# Patient Record
Sex: MALE | Race: WHITE | Employment: OTHER | URBAN - METROPOLITAN AREA
[De-identification: names, ages, dates, MRNs, and addresses within clinical notes are randomized per-mention and may not be internally consistent; named-entity substitution may affect disease eponyms.]

---

## 2017-10-30 PROBLEM — F17.200 SMOKER: Status: ACTIVE | Noted: 2017-10-30

## 2017-10-30 PROBLEM — G47.00 INSOMNIA: Status: ACTIVE | Noted: 2017-10-30

## 2017-10-30 PROBLEM — E78.00 HYPERCHOLESTEREMIA: Status: ACTIVE | Noted: 2017-10-30

## 2018-03-08 ENCOUNTER — ANESTHESIA EVENT (OUTPATIENT)
Dept: SURGERY | Age: 68
End: 2018-03-08
Payer: MEDICARE

## 2018-03-09 ENCOUNTER — HOSPITAL ENCOUNTER (OUTPATIENT)
Age: 68
Setting detail: OUTPATIENT SURGERY
Discharge: HOME | End: 2018-03-09
Attending: PODIATRIST | Admitting: PODIATRIST
Payer: MEDICARE

## 2018-03-09 ENCOUNTER — ANESTHESIA (OUTPATIENT)
Dept: SURGERY | Age: 68
End: 2018-03-09
Payer: MEDICARE

## 2018-03-09 ENCOUNTER — APPOINTMENT (OUTPATIENT)
Dept: GENERAL RADIOLOGY | Age: 68
End: 2018-03-09
Attending: PODIATRIST
Payer: MEDICARE

## 2018-03-09 VITALS
WEIGHT: 185 LBS | TEMPERATURE: 97.7 F | HEART RATE: 66 BPM | SYSTOLIC BLOOD PRESSURE: 132 MMHG | RESPIRATION RATE: 12 BRPM | BODY MASS INDEX: 25.09 KG/M2 | DIASTOLIC BLOOD PRESSURE: 79 MMHG | OXYGEN SATURATION: 95 %

## 2018-03-09 PROCEDURE — 77030004413 HC BUR OVL STRY -B: Performed by: PODIATRIST

## 2018-03-09 PROCEDURE — 77030032490 HC SLV COMPR SCD KNE COVD -B: Performed by: PODIATRIST

## 2018-03-09 PROCEDURE — 76010000161 HC OR TIME 1 TO 1.5 HR INTENSV-TIER 1: Performed by: PODIATRIST

## 2018-03-09 PROCEDURE — 74011250636 HC RX REV CODE- 250/636: Performed by: ANESTHESIOLOGY

## 2018-03-09 PROCEDURE — 76942 ECHO GUIDE FOR BIOPSY: CPT | Performed by: PODIATRIST

## 2018-03-09 PROCEDURE — 77030018836 HC SOL IRR NACL ICUM -A: Performed by: PODIATRIST

## 2018-03-09 PROCEDURE — 77030031139 HC SUT VCRL2 J&J -A: Performed by: PODIATRIST

## 2018-03-09 PROCEDURE — 77030002982 HC SUT POLYSRB J&J -A: Performed by: PODIATRIST

## 2018-03-09 PROCEDURE — 77030000032 HC CUF TRNQT ZIMM -B: Performed by: PODIATRIST

## 2018-03-09 PROCEDURE — 77030006788 HC BLD SAW OSC STRY -B: Performed by: PODIATRIST

## 2018-03-09 PROCEDURE — 77030003602 HC NDL NRV BLK BBMI -B: Performed by: NURSE ANESTHETIST, CERTIFIED REGISTERED

## 2018-03-09 PROCEDURE — 74011250636 HC RX REV CODE- 250/636: Performed by: PODIATRIST

## 2018-03-09 PROCEDURE — 77030011640 HC PAD GRND REM COVD -A: Performed by: PODIATRIST

## 2018-03-09 PROCEDURE — 74011250636 HC RX REV CODE- 250/636

## 2018-03-09 PROCEDURE — 74011000250 HC RX REV CODE- 250: Performed by: PODIATRIST

## 2018-03-09 PROCEDURE — 76210000063 HC OR PH I REC FIRST 0.5 HR: Performed by: PODIATRIST

## 2018-03-09 PROCEDURE — C1776 JOINT DEVICE (IMPLANTABLE): HCPCS | Performed by: PODIATRIST

## 2018-03-09 PROCEDURE — 76060000034 HC ANESTHESIA 1.5 TO 2 HR: Performed by: PODIATRIST

## 2018-03-09 PROCEDURE — 76210000020 HC REC RM PH II FIRST 0.5 HR: Performed by: PODIATRIST

## 2018-03-09 PROCEDURE — 76010010054 HC POST OP PAIN BLOCK: Performed by: PODIATRIST

## 2018-03-09 DEVICE — JOINT METATRSL MTP 10MM --: Type: IMPLANTABLE DEVICE | Site: FOOT | Status: FUNCTIONAL

## 2018-03-09 RX ORDER — CELECOXIB 200 MG/1
200 CAPSULE ORAL
Status: DISCONTINUED | OUTPATIENT
Start: 2018-03-09 | End: 2018-03-09 | Stop reason: HOSPADM

## 2018-03-09 RX ORDER — DEXAMETHASONE SODIUM PHOSPHATE 100 MG/10ML
INJECTION INTRAMUSCULAR; INTRAVENOUS AS NEEDED
Status: DISCONTINUED | OUTPATIENT
Start: 2018-03-09 | End: 2018-03-09 | Stop reason: HOSPADM

## 2018-03-09 RX ORDER — MIDAZOLAM HYDROCHLORIDE 1 MG/ML
2 INJECTION, SOLUTION INTRAMUSCULAR; INTRAVENOUS
Status: DISCONTINUED | OUTPATIENT
Start: 2018-03-09 | End: 2018-03-09 | Stop reason: HOSPADM

## 2018-03-09 RX ORDER — SODIUM CHLORIDE, SODIUM LACTATE, POTASSIUM CHLORIDE, CALCIUM CHLORIDE 600; 310; 30; 20 MG/100ML; MG/100ML; MG/100ML; MG/100ML
75 INJECTION, SOLUTION INTRAVENOUS CONTINUOUS
Status: DISCONTINUED | OUTPATIENT
Start: 2018-03-09 | End: 2018-03-09 | Stop reason: HOSPADM

## 2018-03-09 RX ORDER — SODIUM CHLORIDE 0.9 % (FLUSH) 0.9 %
5-10 SYRINGE (ML) INJECTION AS NEEDED
Status: DISCONTINUED | OUTPATIENT
Start: 2018-03-09 | End: 2018-03-09 | Stop reason: HOSPADM

## 2018-03-09 RX ORDER — BACITRACIN 50000 [IU]/1
INJECTION, POWDER, FOR SOLUTION INTRAMUSCULAR AS NEEDED
Status: DISCONTINUED | OUTPATIENT
Start: 2018-03-09 | End: 2018-03-09 | Stop reason: HOSPADM

## 2018-03-09 RX ORDER — LIDOCAINE HYDROCHLORIDE 10 MG/ML
0.1 INJECTION INFILTRATION; PERINEURAL AS NEEDED
Status: DISCONTINUED | OUTPATIENT
Start: 2018-03-09 | End: 2018-03-09 | Stop reason: HOSPADM

## 2018-03-09 RX ORDER — OXYCODONE HYDROCHLORIDE 5 MG/1
5 TABLET ORAL
Status: DISCONTINUED | OUTPATIENT
Start: 2018-03-09 | End: 2018-03-09 | Stop reason: HOSPADM

## 2018-03-09 RX ORDER — SODIUM CHLORIDE, SODIUM LACTATE, POTASSIUM CHLORIDE, CALCIUM CHLORIDE 600; 310; 30; 20 MG/100ML; MG/100ML; MG/100ML; MG/100ML
50 INJECTION, SOLUTION INTRAVENOUS CONTINUOUS
Status: DISCONTINUED | OUTPATIENT
Start: 2018-03-09 | End: 2018-03-09 | Stop reason: HOSPADM

## 2018-03-09 RX ORDER — ALBUTEROL SULFATE 0.83 MG/ML
2.5 SOLUTION RESPIRATORY (INHALATION) AS NEEDED
Status: DISCONTINUED | OUTPATIENT
Start: 2018-03-09 | End: 2018-03-09 | Stop reason: HOSPADM

## 2018-03-09 RX ORDER — SODIUM CHLORIDE 0.9 % (FLUSH) 0.9 %
5-10 SYRINGE (ML) INJECTION EVERY 8 HOURS
Status: DISCONTINUED | OUTPATIENT
Start: 2018-03-09 | End: 2018-03-09 | Stop reason: HOSPADM

## 2018-03-09 RX ORDER — FENTANYL CITRATE 50 UG/ML
100 INJECTION, SOLUTION INTRAMUSCULAR; INTRAVENOUS ONCE
Status: DISCONTINUED | OUTPATIENT
Start: 2018-03-09 | End: 2018-03-09 | Stop reason: HOSPADM

## 2018-03-09 RX ORDER — MIDAZOLAM HYDROCHLORIDE 1 MG/ML
2 INJECTION, SOLUTION INTRAMUSCULAR; INTRAVENOUS ONCE
Status: COMPLETED | OUTPATIENT
Start: 2018-03-09 | End: 2018-03-09

## 2018-03-09 RX ORDER — HYDROMORPHONE HYDROCHLORIDE 2 MG/ML
0.5 INJECTION, SOLUTION INTRAMUSCULAR; INTRAVENOUS; SUBCUTANEOUS
Status: DISCONTINUED | OUTPATIENT
Start: 2018-03-09 | End: 2018-03-09 | Stop reason: HOSPADM

## 2018-03-09 RX ORDER — SODIUM CHLORIDE 9 MG/ML
INJECTION INTRAMUSCULAR; INTRAVENOUS; SUBCUTANEOUS AS NEEDED
Status: DISCONTINUED | OUTPATIENT
Start: 2018-03-09 | End: 2018-03-09 | Stop reason: HOSPADM

## 2018-03-09 RX ORDER — PROPOFOL 10 MG/ML
INJECTION, EMULSION INTRAVENOUS
Status: DISCONTINUED | OUTPATIENT
Start: 2018-03-09 | End: 2018-03-09 | Stop reason: HOSPADM

## 2018-03-09 RX ORDER — BUPIVACAINE HYDROCHLORIDE 5 MG/ML
INJECTION, SOLUTION EPIDURAL; INTRACAUDAL AS NEEDED
Status: DISCONTINUED | OUTPATIENT
Start: 2018-03-09 | End: 2018-03-09 | Stop reason: HOSPADM

## 2018-03-09 RX ORDER — CEFAZOLIN SODIUM/WATER 2 G/20 ML
2 SYRINGE (ML) INTRAVENOUS
Status: COMPLETED | OUTPATIENT
Start: 2018-03-09 | End: 2018-03-09

## 2018-03-09 RX ORDER — PROPOFOL 10 MG/ML
INJECTION, EMULSION INTRAVENOUS AS NEEDED
Status: DISCONTINUED | OUTPATIENT
Start: 2018-03-09 | End: 2018-03-09 | Stop reason: HOSPADM

## 2018-03-09 RX ADMIN — MIDAZOLAM HYDROCHLORIDE 2 MG: 1 INJECTION, SOLUTION INTRAMUSCULAR; INTRAVENOUS at 09:44

## 2018-03-09 RX ADMIN — SODIUM CHLORIDE, SODIUM LACTATE, POTASSIUM CHLORIDE, AND CALCIUM CHLORIDE 75 ML/HR: 600; 310; 30; 20 INJECTION, SOLUTION INTRAVENOUS at 08:45

## 2018-03-09 RX ADMIN — PROPOFOL 200 MCG/KG/MIN: 10 INJECTION, EMULSION INTRAVENOUS at 10:59

## 2018-03-09 RX ADMIN — Medication 2 G: at 11:06

## 2018-03-09 RX ADMIN — PROPOFOL 30 MG: 10 INJECTION, EMULSION INTRAVENOUS at 10:59

## 2018-03-09 RX ADMIN — PROPOFOL 20 MG: 10 INJECTION, EMULSION INTRAVENOUS at 11:04

## 2018-03-09 NOTE — ANESTHESIA PREPROCEDURE EVALUATION
Anesthetic History   No history of anesthetic complications            Review of Systems / Medical History  Patient summary reviewed, nursing notes reviewed and pertinent labs reviewed    Pulmonary          Smoker (recently quit Feb 2018)         Neuro/Psych   Within defined limits           Cardiovascular  Within defined limits                Exercise tolerance: >4 METS     GI/Hepatic/Renal  Within defined limits              Endo/Other        Arthritis     Other Findings              Physical Exam    Airway  Mallampati: II      Mouth opening: Normal     Cardiovascular  Regular rate and rhythm,  S1 and S2 normal,  no murmur, click, rub, or gallop             Dental  No notable dental hx       Pulmonary  Breath sounds clear to auscultation               Abdominal         Other Findings            Anesthetic Plan    ASA: 2  Anesthesia type: total IV anesthesia - femoral single shot and popliteal fossa block      Post-op pain plan if not by surgeon: peripheral nerve block single    Induction: Intravenous  Anesthetic plan and risks discussed with: Patient and Spouse

## 2018-03-09 NOTE — ANESTHESIA POSTPROCEDURE EVALUATION
Post-Anesthesia Evaluation and Assessment    Patient: Doris Chambers MRN: [id number]  SSN: [id number]    YOB: 1950  Age: 74 y.o. Sex: male       Cardiovascular Function/Vital Signs  Visit Vitals    /79    Pulse 66    Temp 36.5 °C (97.7 °F)    Resp 12    Wt 83.9 kg (185 lb)    SpO2 95%    BMI 25.09 kg/m2       Patient is status post total IV anesthesia anesthesia for Procedure(s):  RIGHT FOOT 1ST MTP CHEILECTOMY WITH CARTIVA IMPLANT  POLITEAL SAPHENOUS NERVE BLOCK. Nausea/Vomiting: None    Postoperative hydration reviewed and adequate. Pain:  Pain Scale 1: Numeric (0 - 10) (03/09/18 1238)  Pain Intensity 1: 0 (03/09/18 1238)   Managed    Neurological Status:   Neuro (WDL): Exceptions to WDL (03/09/18 1238)  Neuro  Neurologic State: Alert (03/09/18 1238)  Orientation Level: Oriented X4 (03/09/18 1238)  LUE Motor Response: Purposeful (03/09/18 1238)  LLE Motor Response: Purposeful (03/09/18 1238)  RUE Motor Response: Purposeful (03/09/18 1238)  RLE Motor Response: Pharmacologically paralyzed (03/09/18 1238)   At baseline    Mental Status and Level of Consciousness: Arousable    Pulmonary Status:   O2 Device: Room air (03/09/18 1238)   Adequate oxygenation and airway patent    Complications related to anesthesia: None    Post-anesthesia assessment completed.  No concerns    Signed By: Marybeth Meeyr MD     March 9, 2018

## 2018-03-09 NOTE — BRIEF OP NOTE
BRIEF OPERATIVE NOTE    Date of Procedure: 3/9/2018   Preoperative Diagnosis: Foot pain, right [M79.671]  Hallux rigidus, right foot [M20.21]  Postoperative Diagnosis: Foot pain, right [M79.671]  Hallux rigidus, right foot [M20.21]    Procedure(s):  RIGHT FOOT 1ST MTP CHEILECTOMY WITH CARTIVA IMPLANT  POLITEAL SAPHENOUS NERVE BLOCK  Surgeon(s) and Role:     * Rosi Oconnell DPM - Primary         Assistant Staff: None      Surgical Staff:  Circ-1: Catrachito Rendon RN  Circ-Relief: Gwen Rey RN  Radiology Technician: Indigo Babcock  Scrub Tech-1: Nilda House  Event Time In   Incision Start 1118   Incision Close 1209     Anesthesia: Regional with local to the rt foot  Estimated Blood Loss: less loyda none cc  Specimens: * No specimens in log *   Findings: hypertrophic 1st mtp joint with loose joint \"mice\"  Complications: none  Implants:   Implant Name Type Inv.  Item Serial No.  Lot No. LRB No. Used Action   JOINT METATRSL MTP 10MM --  - [id number]   JOINT METATRSL MTP 10MM --    [employer] [id number] Right 1 Implanted

## 2018-03-09 NOTE — OP NOTES
[facility] REPORT    Lesly López  MR#: [id number]  : 1950  ACCOUNT #: [de-identified]   DATE OF SERVICE: 2018    SURGEON:  Homa Morales DPM.     ASSISTANT:   None. PREOPERATIVE DIAGNOSIS:  Painful hallux limitus with arthritis of the big toe joint, right foot. POSTOPERATIVE DIAGNOSIS:  Painful hallux limitus with arthritis of the big toe joint, right foot. PROCEDURE PERFORMED:  First and metatarsophalangeal joint cheilectomy with Cartiva implant, right foot. ANESTHESIA:  IV sedation with regional to the right lower extremity and postop local into the foot. ESTIMATED BLOOD LOSS:  Less than 1 mL. HEMOSTASIS:  Ankle pneumatic tourniquet inflated to 250 mmHg for 54 minutes. MATERIALS:  Would be a 10 mm Cartiva implant; 2-0 Vicryl, 3-0 Vicryl, 4-0 Vicryl, 5-0 Vicryl. SPECIMENS REMOVED:  None. COMPLICATIONS:  None. IMPLANTS:  10 mm Cartiva 1st MTP joint implant    INDICATION:  The patient presented to my office with a long-term history of right foot pain in the big toe joint secondary to arthritis. After conservative care failed to relieve these problems and we elected surgical management with a Cartiva implant, I gave him written information. He had plenty of time to think about this procedure and I obtained informed consent by reviewing risks, benefits, complications. I answered all his questions in the office several weeks prior to surgery. OPERATION:  The patient was brought into the operating room, placed in a supine position on the operating table. Following IV sedation, the foot was scrubbed, prepped and draped in usual sterile fashion. Attention now directed to the right foot where a dorsal medial incision of the first MTP joint was made following sharp and blunt dissection with care to preserve major neurovascular structures. Incision was taken down to the level of the first MTP joint capsule. An inverted T capsulotomy was performed. Sharp and blunt dissection revealed loose joint osseous bodies floating around the big toe joint and hypertrophic bone on both sides of the joint. I resected this with hand held instrumentation and power instrumentation as well, smoothed down any rough osseous edges. I freely dissected the collateral ligaments of the first MTP joint exposing the arthritic first metatarsal head where most of the cartilage has been denuded. At this point, I performed the Cartiva implant using the standard instrumentation. The implant seated well in the first metatarsal head. I copiously irrigated the wound and incision site with normal sterile saline and bacitracin solution multiple times and I reapproximated the first MTP joint capsule after doing a capsulorrhaphy with 3-0 Vicryl holding the toe in a much more rectus alignment. Deep and superficial fascia was sutured with 2-0 Vicryl, 3-0 Vicryl and 4-0 Vicryl and the skin with 5-0 Vicryl in a subcuticular fashion. Steri-Strips were applied. At this point, a total of 10 mL in a 9:1 mixture of 0.5% plain Marcaine and dexamethasone phosphate 4 mg was injected as a Schwab block local anesthetic infiltration. The C-arm was brought into the field several times to do an AP and lateral view showing increased joint space in the first MTP joint with the toe in a fairly more rectus alignment. Tourniquet was released. Capillary refill returned to all 5 toes as I applied Steri-Strips, Adaptic, 4 x 4 bandage, conform bandage and Coban. The patient's wife was given written and verbal postop instructions along with a pain prescription and antibiotic and he will follow up in the office in 1 week.       ROMEO Gonzalez / BAHMAN  D: 03/09/2018 12:35     T: 03/09/2018 13:06  JOB #: [id number]

## 2018-03-09 NOTE — DISCHARGE INSTRUCTIONS
Post Op Podiatric Surgery Orders    1. Elevate foot / ankle. 2. Ice to foot / ankle. 3. Post op shoe walk as tolerated. 4. Manage pain as per anesthesia. 5. Follow up appointment is on: one week      at the [facility] office. ACTIVITY  · As tolerated and as directed by your doctor. · Bathe or shower as directed by your doctor. DIET  · Clear liquids until no nausea or vomiting; then light diet for the first day. · Advance to regular diet on second day, unless your doctor orders otherwise. · If nausea and vomiting continues, call your doctor. PAIN  · Take pain medication as directed by your doctor. · Call your doctor if pain is NOT relieved by medication. · DO NOT take aspirin of blood thinners unless directed by your doctor. CALL YOUR DOCTOR IF   · Excessive bleeding that does not stop after holding pressure over the area  · Temperature of 101 degrees F or above  · Excessive redness, swelling or bruising, and/ or green or yellow, smelly discharge from incision    AFTER ANESTHESIA   · For the first 24 hours: DO NOT Drive, Drink alcoholic beverages, or Make important decisions. · Be aware of dizziness following anesthesia and while taking pain medication. After general anesthesia or intravenous sedation, for 24 hours or while taking prescription Narcotics:  · Limit your activities  · Do not drive and operate hazardous machinery  · Do not make important personal or business decisions  · Do  not drink alcoholic beverages  · If you have not urinated within 8 hours after discharge, please contact your surgeon on call. *  Please give a list of your current medications to your Primary Care Provider. *  Please update this list whenever your medications are discontinued, doses are      changed, or new medications (including over-the-counter products) are added. *  Please carry medication information at all times in case of emergency situations.       These are general instructions for a healthy lifestyle:    No smoking/ No tobacco products/ Avoid exposure to second hand smoke    Surgeon General's Warning:  Quitting smoking now greatly reduces serious risk to your health. Obesity, smoking, and sedentary lifestyle greatly increases your risk for illness    A healthy diet, regular physical exercise & weight monitoring are important for maintaining a healthy lifestyle    You may be retaining fluid if you have a history of heart failure or if you experience any of the following symptoms:  Weight gain of 3 pounds or more overnight or 5 pounds in a week, increased swelling in our hands or feet or shortness of breath while lying flat in bed. Please call your doctor as soon as you notice any of these symptoms; do not wait until your next office visit. Recognize signs and symptoms of STROKE:    F-face looks uneven    A-arms unable to move or move unevenly    S-speech slurred or non-existent    T-time-call 911 as soon as signs and symptoms begin-DO NOT go       Back to bed or wait to see if you get better-TIME IS BRAIN.

## 2018-03-09 NOTE — PERIOP NOTES
PACU DISCHARGE NOTE    Vital signs stable, pain well controlled, alert and oriented times three or at baseline, follow up per surgeon, no anesthetic complications. Discharge instructions given to pt and his wife. Pt is without c/o pain or discomfort and has had his IV removed intact. Pt had some breakthrough bleeding to dressing once getting up to dress. Dressing reinforced with extra gauze and Kerlix. Pt to discharge door via wheelchair and left in care of his wife, Flaquito Rhodes. Flaquito Rhodes has pt's post-op prescriptions for Norco and Cleocin.  Pt to discharge door via wheelchair and left in care of his wifel

## 2018-03-09 NOTE — IP AVS SNAPSHOT
[facility] 
 
 
 [address] 
[phone] Patient: Zac Taylor MRN: [id number] AAL:4/27/8515 About your hospitalization You were admitted on:  March 9, 2018 You last received care in the:  [facility] OP PACU You were discharged on:  March 9, 2018 Why you were hospitalized Your primary diagnosis was:  Not on File Follow-up Information Follow up With Details Comments Contact Info [place]   [address] [facility] [place] [address] 
[phone] In 1 week For wound re-check Discharge Orders None A check kal indicates which time of day the medication should be taken. My Medications CONTINUE taking these medications Instructions Each Dose to Equal  
 Morning Noon Evening Bedtime  
 melatonin 3 mg tablet Your last dose was: Your next dose is: Take 3 mg by mouth nightly. 3 mg METAMUCIL PO Your last dose was: Your next dose is: Take  by mouth daily as needed. tadalafil 20 mg tablet Commonly known as:  CIALIS Your last dose was: Your next dose is: Take 1 Tab by mouth as needed. 20 mg  
    
   
   
   
  
  
STOP taking these medications   
 lidocaine 5 % ointment Commonly known as:  XYLOCAINE Discharge Instructions [facility] Podiatric Surgery Orders 1. Elevate foot / ankle. 2. Ice to foot / ankle. 3. Post op shoe walk as tolerated. 4. Manage pain as per anesthesia. 5. Follow up appointment is on: one week 
    at the [facility] office. ACTIVITY · As tolerated and as directed by your doctor. · Bathe or shower as directed by your doctor. DIET · Clear liquids until no nausea or vomiting; then light diet for the first day.  
· Advance to regular diet on second day, unless your doctor orders otherwise. · If nausea and vomiting continues, call your doctor. PAIN 
· Take pain medication as directed by your doctor. · Call your doctor if pain is NOT relieved by medication. · DO NOT take aspirin of blood thinners unless directed by your doctor. CALL YOUR DOCTOR IF  
· Excessive bleeding that does not stop after holding pressure over the area · Temperature of 101 degrees F or above · Excessive redness, swelling or bruising, and/ or green or yellow, smelly discharge from incision AFTER ANESTHESIA · For the first 24 hours: DO NOT Drive, Drink alcoholic beverages, or Make important decisions. · Be aware of dizziness following anesthesia and while taking pain medication. After general anesthesia or intravenous sedation, for 24 hours or while taking prescription Narcotics: · Limit your activities · Do not drive and operate hazardous machinery · Do not make important personal or business decisions · Do  not drink alcoholic beverages · If you have not urinated within 8 hours after discharge, please contact your surgeon on call. *  Please give a list of your current medications to your Primary Care Provider. *  Please update this list whenever your medications are discontinued, doses are 
    changed, or new medications (including over-the-counter products) are added. *  Please carry medication information at all times in case of emergency situations. These are general instructions for a healthy lifestyle: No smoking/ No tobacco products/ Avoid exposure to second hand smoke Surgeon General's Warning:  Quitting smoking now greatly reduces serious risk to your health. Obesity, smoking, and sedentary lifestyle greatly increases your risk for illness A healthy diet, regular physical exercise & weight monitoring are important for maintaining a healthy lifestyle You may be retaining fluid if you have a history of heart failure or if you experience any of the following symptoms:  Weight gain of 3 pounds or more overnight or 5 pounds in a week, increased swelling in our hands or feet or shortness of breath while lying flat in bed. Please call your doctor as soon as you notice any of these symptoms; do not wait until your next office visit. Recognize signs and symptoms of STROKE: 
 
F-face looks uneven A-arms unable to move or move unevenly S-speech slurred or non-existent T-time-call 911 as soon as signs and symptoms begin-DO NOT go Back to bed or wait to see if you get better-TIME IS BRAIN. Introducing [facility]! [facility] introduces GuestDriven patient portal. Now you can access parts of your medical record, email your doctor's office, and request medication refills online. 1. In your internet browser, go to [email or web address] 2. Click on the First Time User? Click Here link in the Sign In box. You will see the New Member Sign Up page. 3. Enter your GuestDriven Access Code exactly as it appears below. You will not need to use this code after youve completed the sign-up process. If you do not sign up before the expiration date, you must request a new code. · GuestDriven Access Code: [email or web address] Expires: 6/4/2018 12:24 PM 
 
4. Enter the last four digits of your Social Security Number (xxxx) and Date of Birth (mm/dd/yyyy) as indicated and click Submit. You will be taken to the next sign-up page. 5. Create a M-Audiot ID. This will be your GuestDriven login ID and cannot be changed, so think of one that is secure and easy to remember. 6. Create a M-Audiot password. You can change your password at any time. 7. Enter your Password Reset Question and Answer. This can be used at a later time if you forget your password. 8. Enter your e-mail address. You will receive e-mail notification when new information is available in [address] 9. Click Sign Up. You can now view and download portions of your medical record. 10. Click the Download Summary menu link to download a portable copy of your medical information. If you have questions, please visit the Frequently Asked Questions section of the ManageIQ website. Remember, ManageIQ is NOT to be used for urgent needs. For medical emergencies, dial 911. Now available from your iPhone and Android! Unresulted Labs-Please follow up with your PCP about these lab tests Order Current Status [facility]CHNOLOGIST [facility] In process Providers Seen During Your Hospitalization Provider Specialty Primary office phone [facility] [phone], [address] [facility] [phone] Your Primary Care Physician (PCP) Primary Care Physician Office Phone Office Fax [address], [address] [phone] You are allergic to the following No active allergies Recent Documentation Weight BMI Smoking Status 83.9 kg 25.09 kg/m2 Former Smoker Emergency Contacts Name Discharge Info Relation Home Work Mobile [address] CAREGIVER [3] Spouse [3] [phone] Patient Belongings The following personal items are in your possession at time of discharge: 
  Dental Appliances: None  Visual Aid: Glasses      Home Medications: None   Jewelry: None  Clothing: Undergarments, Pants, Shirt    Other Valuables: None Please provide this summary of care documentation to your next provider. Signatures-by signing, you are acknowledging that this After Visit Summary has been reviewed with you and you have received a copy. Patient Signature:  ____________________________________________________________ Date:  ____________________________________________________________  
  
Goran Katz Provider Signature:  ____________________________________________________________ Date:  ____________________________________________________________

## 2018-03-09 NOTE — ANESTHESIA PROCEDURE NOTES
Peripheral Block    Start time: 3/9/2018 9:44 AM  End time: 3/9/2018 9:47 AM  Performed by: Meir Ballard  Authorized by: Meir Ballard       Pre-procedure:    Indications: at surgeon's request and post-op pain management    Preanesthetic Checklist: patient identified, risks and benefits discussed, site marked, timeout performed, anesthesia consent given and patient being monitored    Timeout Time: 09:44          Block Type:   Block Type:  Popliteal  Laterality:  Right  Monitoring:  Responsive to questions, continuous pulse ox, oxygen, frequent vital sign checks and heart rate  Injection Technique:  Single shot  Procedures: ultrasound guided and nerve stimulator    Patient Position: left lateral decubitus  Prep: chlorhexidine    Location:  Lower thigh  Needle Type:  Stimuplex  Needle Gauge:  21 G  Needle Localization:  Ultrasound guidance and nerve stimulator  Motor Response: minimal motor response >0.4 mA    Medication Injected:  0.5%  ropivacaine  Adds:  Epi 1:200K  Volume (mL):  20  Add'l Medication Injected:  1.5%  mepivacaine  Adds:  Epi 1:200K  Volume (mL):  15    Assessment:  Number of attempts:  1  Injection Assessment:  Incremental injection every 5 mL, negative aspiration for CSF, no paresthesia, ultrasound image on chart, no intravascular symptoms, negative aspiration for blood and local visualized surrounding nerve on ultrasound  Patient tolerance:  Patient tolerated the procedure well with no immediate complications

## 2018-03-09 NOTE — H&P
Patient: Amor Clark MRN: [id number]  SSN: [id number]    YOB: 1950  Age: 74 y.o. Sex: male      History and Physical    Amor Clark is a 74 y.o. male having Procedure(s):  RIGHT FOOT 1ST MTP CHEILECTOMY WITH CARTIVA IMPLANT  POLITEAL SAPHENOUS NERVE BLOCK. Allergies: No Known Allergies     Chief Complaint: right big toe pain     History of Present Illness: right big toe arthritis    Past Medical History:   Diagnosis Date    Actinic keratosis     Adverse effect of anesthesia     liver enzymes increased for 1 week following general anesthesia /rotator cuff repair 2011-    Elevated blood pressure reading without diagnosis of hypertension     Erectile dysfunction     Former smoker     Quit 2/2018--- 1 ppd x 40 yrs    Osteoarthritis     feet    Overweight for height     Pain in joint involving pelvic region and thigh     Rosacea     Rupture of left distal biceps tendon     Sebaceous cyst       Past Surgical History:   Procedure Laterality Date    HX COLONOSCOPY      HX ROTATOR CUFF REPAIR Left 2013    HX SHOULDER ARTHROSCOPY Right 2011    bicep      Family History   Problem Relation Age of Onset    Heart Disease Mother     Heart Disease Father     Heart Attack Brother      before age 61    Hypertension Brother     Heart Disease Brother       Social History   Substance Use Topics    Smoking status: Former Smoker     Packs/day: 1.00     Years: 40.00     Quit date: 2/21/2018    Smokeless tobacco: Never Used    Alcohol use 0.6 oz/week     1 Glasses of wine per week        Prior to Admission medications    Medication Sig Start Date End Date Taking? Authorizing Provider   diclofenac sodium 1.5 % drop as needed. 2/1/18  Yes Historical Provider   PSYLLIUM HUSK (METAMUCIL PO) Take  by mouth daily as needed. Yes Historical Provider   melatonin 3 mg tablet Take 3 mg by mouth nightly. Yes Historical Provider   lidocaine (XYLOCAINE) 5 % ointment nightly.  2/1/18 Historical Provider   tadalafil (CIALIS) 20 mg tablet Take 1 Tab by mouth as needed. 10/30/17   Kyara Gonzalez MD        Visit Vitals    BP (!) 166/105 (BP 1 Location: Right arm, BP Patient Position: At rest)    Pulse 77    Temp 36.5 °C (97.7 °F)    Resp 17    Wt 83.9 kg (185 lb)    SpO2 99%    BMI 25.09 kg/m2        Assessment and Plan:   Vargas Pfeiffer is a 74 y.o. male having Procedure(s):  RIGHT FOOT 1ST MTP CHEILECTOMY WITH CARTIVA IMPLANT  POLITEAL SAPHENOUS NERVE BLOCK for right big toe arthritis.     Preanesthesia Evaluation     Last edited 03/09/18 2383 by Mera Kern MD             Anesthetic History   No history of anesthetic complications            Review of Systems / Medical History  Patient summary reviewed, nursing notes reviewed and pertinent labs reviewed    Pulmonary          Smoker (recently quit Feb 2018)         Neuro/Psych   Within defined limits           Cardiovascular  Within defined limits                Exercise tolerance: >4 METS     GI/Hepatic/Renal  Within defined limits              Endo/Other        Arthritis     Other Findings              Physical Exam    Airway  Mallampati: II      Mouth opening: Normal     Cardiovascular  Regular rate and rhythm,  S1 and S2 normal,  no murmur, click, rub, or gallop             Dental  No notable dental hx       Pulmonary  Breath sounds clear to auscultation               Abdominal         Other Findings            Anesthetic Plan    ASA: 2  Anesthesia type: total IV anesthesia - femoral single shot and popliteal fossa block      Post-op pain plan if not by surgeon: peripheral nerve block single    Induction: Intravenous  Anesthetic plan and risks discussed with: Patient and Spouse               Preanesthesia evaluation performed by Mera Kern MD            Signed By: Mera Kern MD     March 9, 2018

## 2018-03-09 NOTE — ANESTHESIA PROCEDURE NOTES
Peripheral Block    Start time: 3/9/2018 9:48 AM  End time: 3/9/2018 9:49 AM  Performed by: Oleksandr Brown  Authorized by: Oleksandr Brown       Pre-procedure: Indications: at surgeon's request and post-op pain management    Preanesthetic Checklist: patient identified, risks and benefits discussed, site marked, timeout performed, anesthesia consent given and patient being monitored    Timeout Time: 09:48          Block Type:   Block Type:   Adductor canal  Laterality:  Left  Monitoring:  Responsive to questions, continuous pulse ox, oxygen, frequent vital sign checks and heart rate  Injection Technique:  Single shot  Procedures: ultrasound guided    Patient Position: supine  Prep: chlorhexidine    Location:  Upper thigh  Needle Type:  Stimuplex  Needle Gauge:  21 G  Needle Localization:  Ultrasound guidance  Medication Injected:  1.5%  mepivacaine  Adds:  Epi 1:200K  Volume (mL):  15    Assessment:  Number of attempts:  1  Injection Assessment:  Incremental injection every 5 mL, negative aspiration for CSF, no paresthesia, ultrasound image on chart, no intravascular symptoms, negative aspiration for blood and local visualized surrounding nerve on ultrasound  Patient tolerance:  Patient tolerated the procedure well with no immediate complications

## 2018-10-31 PROBLEM — I10 HYPERTENSION: Status: ACTIVE | Noted: 2018-10-31

## 2018-11-07 PROBLEM — Z86.73 HISTORY OF CVA (CEREBROVASCULAR ACCIDENT): Status: ACTIVE | Noted: 2018-11-07

## 2018-11-09 ENCOUNTER — HOSPITAL ENCOUNTER (OUTPATIENT)
Dept: CT IMAGING | Age: 68
Discharge: HOME | End: 2018-11-09
Attending: INTERNAL MEDICINE
Payer: MEDICARE

## 2018-11-09 VITALS — HEIGHT: 72 IN | BODY MASS INDEX: 26.28 KG/M2 | WEIGHT: 194 LBS

## 2018-11-09 DIAGNOSIS — E78.00 HYPERCHOLESTEREMIA: ICD-10-CM

## 2018-11-09 DIAGNOSIS — Z86.73 HISTORY OF CVA (CEREBROVASCULAR ACCIDENT): ICD-10-CM

## 2018-11-09 PROCEDURE — 74011636320 HC RX REV CODE- 636/320: Performed by: INTERNAL MEDICINE

## 2018-11-09 PROCEDURE — 70498 CT ANGIOGRAPHY NECK: CPT

## 2018-11-09 PROCEDURE — 74011000258 HC RX REV CODE- 258: Performed by: INTERNAL MEDICINE

## 2018-11-09 RX ORDER — SODIUM CHLORIDE 0.9 % (FLUSH) 0.9 %
10 SYRINGE (ML) INJECTION
Status: COMPLETED | OUTPATIENT
Start: 2018-11-09 | End: 2018-11-09

## 2018-11-09 RX ADMIN — IOPAMIDOL 80 ML: 755 INJECTION, SOLUTION INTRAVENOUS at 13:52

## 2018-11-09 RX ADMIN — SODIUM CHLORIDE 100 ML: 900 INJECTION, SOLUTION INTRAVENOUS at 13:52

## 2018-11-09 RX ADMIN — Medication 10 ML: at 13:52

## 2018-11-16 ENCOUNTER — HOSPITAL ENCOUNTER (OUTPATIENT)
Dept: LAB | Age: 68
Discharge: HOME | End: 2018-11-16
Payer: MEDICARE

## 2018-11-16 DIAGNOSIS — I63.412 CEREBROVASCULAR ACCIDENT (CVA) DUE TO EMBOLISM OF LEFT MIDDLE CEREBRAL ARTERY (HCC): ICD-10-CM

## 2018-11-16 DIAGNOSIS — G62.9 NEUROPATHY: ICD-10-CM

## 2018-11-16 LAB
EST. AVERAGE GLUCOSE BLD GHB EST-MCNC: 120 MG/DL
HBA1C MFR BLD: 5.8 % (ref 4.8–6)

## 2018-11-16 PROCEDURE — 83036 HEMOGLOBIN GLYCOSYLATED A1C: CPT

## 2018-11-16 PROCEDURE — 36415 COLL VENOUS BLD VENIPUNCTURE: CPT

## 2018-12-10 PROBLEM — F17.200 SMOKER: Status: RESOLVED | Noted: 2017-10-30 | Resolved: 2018-12-10

## 2018-12-12 PROBLEM — I69.30 HISTORY OF STROKE WITH RESIDUAL DEFICIT: Status: ACTIVE | Noted: 2018-11-07

## 2019-01-05 NOTE — PROGRESS NOTES
Patient pre-assessment complete for MADELEINE/Loop with Dr Jerry Alston scheduled for 19 at 8am, arrival time 7am. Patient verified using . Patient instructed to bring all home medications in labeled bottles on the day of procedure. NPO status reinforced. Patient instructed to HOLD chlorthalidone on Monday am. Instructed they can take all other medications excluding vitamins & supplements. Patient verbalizes understanding of all instructions & denies any questions at this time.

## 2019-01-07 ENCOUNTER — HOSPITAL ENCOUNTER (OUTPATIENT)
Dept: CARDIAC CATH/INVASIVE PROCEDURES | Age: 69
Discharge: HOME | End: 2019-01-07
Attending: INTERNAL MEDICINE | Admitting: INTERNAL MEDICINE
Payer: MEDICARE

## 2019-01-07 VITALS
WEIGHT: 192 LBS | HEIGHT: 72 IN | OXYGEN SATURATION: 92 % | BODY MASS INDEX: 26.01 KG/M2 | SYSTOLIC BLOOD PRESSURE: 93 MMHG | HEART RATE: 79 BPM | RESPIRATION RATE: 16 BRPM | DIASTOLIC BLOOD PRESSURE: 50 MMHG

## 2019-01-07 LAB
ANION GAP SERPL CALC-SCNC: 8 MMOL/L (ref 7–16)
ATRIAL RATE: 76 BPM
BUN SERPL-MCNC: 8 MG/DL (ref 8–23)
CALCIUM SERPL-MCNC: 9.8 MG/DL (ref 8.3–10.4)
CALCULATED P AXIS, ECG09: 69 DEGREES
CALCULATED R AXIS, ECG10: 71 DEGREES
CALCULATED T AXIS, ECG11: 69 DEGREES
CHLORIDE SERPL-SCNC: 97 MMOL/L (ref 98–107)
CO2 SERPL-SCNC: 28 MMOL/L (ref 21–32)
CREAT SERPL-MCNC: 0.74 MG/DL (ref 0.8–1.5)
DIAGNOSIS, 93000: NORMAL
ERYTHROCYTE [DISTWIDTH] IN BLOOD BY AUTOMATED COUNT: 12.3 % (ref 11.9–14.6)
GLUCOSE SERPL-MCNC: 100 MG/DL (ref 65–100)
HCT VFR BLD AUTO: 47.2 % (ref 41.1–50.3)
HGB BLD-MCNC: 16.5 G/DL (ref 13.6–17.2)
INR PPP: 1
MCH RBC QN AUTO: 32.7 PG (ref 26.1–32.9)
MCHC RBC AUTO-ENTMCNC: 35 G/DL (ref 31.4–35)
MCV RBC AUTO: 93.7 FL (ref 79.6–97.8)
NRBC # BLD: 0 K/UL (ref 0–0.2)
P-R INTERVAL, ECG05: 122 MS
PLATELET # BLD AUTO: 241 K/UL (ref 150–450)
PMV BLD AUTO: 9.5 FL (ref 9.4–12.3)
POTASSIUM SERPL-SCNC: 3.8 MMOL/L (ref 3.5–5.1)
PROTHROMBIN TIME: 12.5 SEC (ref 11.7–14.5)
Q-T INTERVAL, ECG07: 396 MS
QRS DURATION, ECG06: 90 MS
QTC CALCULATION (BEZET), ECG08: 445 MS
RBC # BLD AUTO: 5.04 M/UL (ref 4.23–5.6)
SODIUM SERPL-SCNC: 133 MMOL/L (ref 136–145)
VENTRICULAR RATE, ECG03: 76 BPM
WBC # BLD AUTO: 5.7 K/UL (ref 4.3–11.1)

## 2019-01-07 PROCEDURE — 93005 ELECTROCARDIOGRAM TRACING: CPT | Performed by: INTERNAL MEDICINE

## 2019-01-07 PROCEDURE — 77030031139 HC SUT VCRL2 J&J -A

## 2019-01-07 PROCEDURE — 99153 MOD SED SAME PHYS/QHP EA: CPT

## 2019-01-07 PROCEDURE — 77030012935 HC DRSG AQUACEL BMS -B

## 2019-01-07 PROCEDURE — 99152 MOD SED SAME PHYS/QHP 5/>YRS: CPT

## 2019-01-07 PROCEDURE — 74011000250 HC RX REV CODE- 250: Performed by: INTERNAL MEDICINE

## 2019-01-07 PROCEDURE — 33285 INSJ SUBQ CAR RHYTHM MNTR: CPT

## 2019-01-07 PROCEDURE — C1764 EVENT RECORDER, CARDIAC: HCPCS

## 2019-01-07 PROCEDURE — 80048 BASIC METABOLIC PNL TOTAL CA: CPT

## 2019-01-07 PROCEDURE — 74011250636 HC RX REV CODE- 250/636

## 2019-01-07 PROCEDURE — 74011250636 HC RX REV CODE- 250/636: Performed by: INTERNAL MEDICINE

## 2019-01-07 PROCEDURE — 93312 ECHO TRANSESOPHAGEAL: CPT

## 2019-01-07 PROCEDURE — 85027 COMPLETE CBC AUTOMATED: CPT

## 2019-01-07 PROCEDURE — 85610 PROTHROMBIN TIME: CPT

## 2019-01-07 RX ORDER — SODIUM CHLORIDE 9 MG/ML
75 INJECTION, SOLUTION INTRAVENOUS CONTINUOUS
Status: DISCONTINUED | OUTPATIENT
Start: 2019-01-07 | End: 2019-01-07 | Stop reason: HOSPADM

## 2019-01-07 RX ORDER — CEFAZOLIN SODIUM/WATER 2 G/20 ML
2 SYRINGE (ML) INTRAVENOUS
Status: COMPLETED | OUTPATIENT
Start: 2019-01-07 | End: 2019-01-07

## 2019-01-07 RX ORDER — MIDAZOLAM HYDROCHLORIDE 1 MG/ML
.5-2 INJECTION, SOLUTION INTRAMUSCULAR; INTRAVENOUS
Status: DISCONTINUED | OUTPATIENT
Start: 2019-01-07 | End: 2019-01-07 | Stop reason: HOSPADM

## 2019-01-07 RX ORDER — FENTANYL CITRATE 50 UG/ML
25-100 INJECTION, SOLUTION INTRAMUSCULAR; INTRAVENOUS
Status: DISCONTINUED | OUTPATIENT
Start: 2019-01-07 | End: 2019-01-07 | Stop reason: HOSPADM

## 2019-01-07 RX ORDER — LIDOCAINE HYDROCHLORIDE AND EPINEPHRINE 10; 10 MG/ML; UG/ML
1.5 INJECTION, SOLUTION INFILTRATION; PERINEURAL ONCE
Status: COMPLETED | OUTPATIENT
Start: 2019-01-07 | End: 2019-01-07

## 2019-01-07 RX ADMIN — MIDAZOLAM HYDROCHLORIDE 2 MG: 1 INJECTION, SOLUTION INTRAMUSCULAR; INTRAVENOUS at 10:09

## 2019-01-07 RX ADMIN — FENTANYL CITRATE 25 MCG: 50 INJECTION, SOLUTION INTRAMUSCULAR; INTRAVENOUS at 10:14

## 2019-01-07 RX ADMIN — MIDAZOLAM HYDROCHLORIDE 1 MG: 1 INJECTION, SOLUTION INTRAMUSCULAR; INTRAVENOUS at 10:33

## 2019-01-07 RX ADMIN — Medication 2 G: at 10:24

## 2019-01-07 RX ADMIN — FENTANYL CITRATE 50 MCG: 50 INJECTION, SOLUTION INTRAMUSCULAR; INTRAVENOUS at 10:09

## 2019-01-07 RX ADMIN — FENTANYL CITRATE 25 MCG: 50 INJECTION, SOLUTION INTRAMUSCULAR; INTRAVENOUS at 10:11

## 2019-01-07 RX ADMIN — MIDAZOLAM HYDROCHLORIDE 2 MG: 1 INJECTION, SOLUTION INTRAMUSCULAR; INTRAVENOUS at 10:11

## 2019-01-07 RX ADMIN — MIDAZOLAM HYDROCHLORIDE 1 MG: 1 INJECTION, SOLUTION INTRAMUSCULAR; INTRAVENOUS at 10:13

## 2019-01-07 RX ADMIN — FENTANYL CITRATE 25 MCG: 50 INJECTION, SOLUTION INTRAMUSCULAR; INTRAVENOUS at 10:33

## 2019-01-07 RX ADMIN — LIDOCAINE HYDROCHLORIDE,EPINEPHRINE BITARTRATE 15 ML: 10; .01 INJECTION, SOLUTION INFILTRATION; PERINEURAL at 10:36

## 2019-01-07 NOTE — PROGRESS NOTES
Patient received to [address] [place] [address] room # 8  Ambulatory from [facility]. Patient scheduled for MADELEINE/loop monitor today with Dr Jerry Alston. Procedure reviewed & questions answered, voiced good understanding consent obtained & placed on chart. All medications and medical history reviewed. Will prep patient per orders. Patient & family updated on plan of care. The patient has a fraility score of 3-MANAGING WELL, based on patient A&Ox3, patient able to ambulate to room without difficulty.

## 2019-01-07 NOTE — DISCHARGE INSTRUCTIONS
AFTER YOU TRANSESOPHAGEAL ECHOCARDIOGRAM    Be sure someone else drives you home. You may feel drowsy for several hours. Do not eat or drink for at least two hours after your procedure. Your throat will be numb and there is a risk you might have difficulty swallowing for a while. Be careful when you do eat or drink for the first time especially with hot fluids since you could easily burn your throat. Call your doctor if:    · You are bleeding from your throat or mouth. · You have trouble breathing all of a sudden. · You have chest pain or any pain that spreads to your neck, jaw, or arms. · You have questions or concerns. · You have a fever greater than 101°F.    Doctor: [facility]    Special Instructions:    No driving for 24 hours. Do not eat or drink until after 12:00pm.      LOOP MONITOR INSTRUCTIONS SHEET      Activity  · As tolerated and directed by your doctor  · Bathe or shower as directed by your doctor    Diet  · Resume your previous diet     Pain  ·  Call your doctor if pain is NOT relieved by OTC medication    Dressing Care: Leave dressing on the incision until seen at your follow up appointment . If dressing becomes loose,  You may remove it. Keep area Clean & dry, Do not get incision wet or  let water run over incision. Do not apply any lotions, creams or powder to incision.     Follow-Up Phone Calls  · Will be made nursing staff  · If you have any problems, call your doctor as needed    Call your doctor if  · Excessive bleeding that does not stop after holding mild pressure over the area  · Temperature of 101 degrees F or above  · Redness,excessive swelling or bruising, and/or green or yellow, smelly discharge from incision    After Anesthesia  · For the first 24 hours: DO NOT Drive, Drink alcoholic beverages, or Make important decisions  · Be aware of dizziness following anesthesia and while taking pain medication    Medications - Continue home medications as previously prescribed Other Instructions- Always show the doctor or dentist your loop recorder identification card.

## 2019-01-07 NOTE — PROCEDURES
[address] Neva Monsalve  MR#: [id number]  : 1950  ACCOUNT #: [de-identified]   DATE OF SERVICE: 2019    REFERRING PHYSICIAN:  Emily Patterson MD    PRIMARY CARDIOLOGIST:  Prasad Mcmillan MD    REASON FOR THE PROCEDURE:  Cryptogenic stroke. PROCEDURE PERFORMED:  Transesophageal echo and loop recorder insertion. COMPLICATIONS:  None. CONSCIOUS SEDATION:  The patient was sedated throughout both procedures with a total of 6 mg Versed and 125 mcg fentanyl by Daly Joshi and monitored from 10:07 a.m. to 10:47 a.m. without complication. FRAILTY SCORE:  3. PROCEDURE TECHNIQUE:  After informed consent was obtained, the patient was brought to the cath lab, prepped and draped in the usual fashion. The oropharynx was anesthetized with topical Cetacaine spray and viscous lidocaine and the transesophageal echo probe easily advanced to approximately 40 cm with appropriate images obtained. There was normal left ventricular function with no evidence for atrial or atrial appendage thrombus formation. Bubble study was performed x2. There does not appear to be a patent foramen ovale or atrial septal defect, but there is scant appearance of a couple of bubbles late in the cardiac cycle suggestive of a trivial intrapulmonary shunt. After transesophageal echo was completed, the patient was prepped and the left chest was anesthetized with lidocaine. A Medtronic LINQ loop recorder was inserted into the left fourth intercostal space in the parasternal region with excellent telemetry. The skin was closed using 3 interrupted sutures of 3-0 Vicryl. There was good hemostasis and the patient was awake, alert and appropriately responsive at the conclusion of the procedure. IMPLANTED DEVICES:  A Medtronic Reveal [facility] [id number].   Settings:  Tachy 162 beats per minute for 16 beats; piyush 30 beats per minute for 4 beats; pauses of 3 seconds in all atrial fibrillation episodes. CONCLUSIONS:  Successful transesophageal echo demonstrating at most a trivial intrapulmonary shunt, as well as a Medtronic loop recorder insertion. PLAN:  Patient will continue his current medications and keep followup in 8-14 days for a loop recorder evaluation and wound check and suture removal and then follow up with me. Garfield Oates MD       ATS / [place]  D: 01/07/2019 10:52     T: 01/07/2019 12:50  JOB #: [id number]  CC: TAPAN VALLE MD  CC: Haydee Evans MD  [address] [place] [address]

## 2019-01-07 NOTE — PROGRESS NOTES
TRANSFER - IN REPORT: 
 
Verbal report received from [facility]) on [place] [facility]  being received from cath lab(unit) for routine progression of care Report consisted of patients Situation, Background, Assessment and  
Recommendations(SBAR). Information from the following report(s) Procedure Summary was reviewed with the receiving nurse. Opportunity for questions and clarification was provided. Assessment completed upon patients arrival to unit and care assumed.

## 2019-01-07 NOTE — PROCEDURES
Brief Cardiac Procedure Note    Patient: Toney Paez MRN: [id number]  SSN: [id number]    YOB: 1950  Age: 79 y.o. Sex: male      Date of Procedure: 1/7/2019     Pre-procedure Diagnosis: cryptogenic CVA    Post-procedure Diagnosis:same    Reason for Procedure: CVA    Procedure: Transesophageal Echocardiogram with LINQ LOOP IMPLANT     Brief Description of Procedure: olena/loop insertion    Performed By: Yolanda Mendez MD     Assistants: none    Anesthesia: Moderate Sedation    Estimated Blood Loss: Less than 10 mL      Specimens: None    Implants: None    Findings:   OLENA- NORMAL LV, NO VALVULAR PATHOLOGY, ROSHAN CLEAR OF CLOT, NO ATRIAL CLOT, VERY SMALL TRIVIAL INTRAPULMONARY SHUNT ON BUBBLE BUT NO PFO OR ASD, LIPOMATOUS HYPERTROPHY SEPTUM    LINQ INSERTION WITHOUT COMPLICATION, TELE LOOKS GOOD    Complications: None    Recommendations: Continue medical therapy.     Signed By: Yolanda Mednez MD     January 7, 2019

## 2019-01-07 NOTE — PROGRESS NOTES
TRANSFER - OUT REPORT: 
 
MADELEINE/Loop implant Dr Kory Colón Versed 6 mg Fentanyl 125 mcg Closed with 3 sutures/aquacel Pt is a/o no complaints VSS Verbal report given to Herber(name) on Richie Frankel  being transferred to CPRU(unit) for routine progression of care Report consisted of patients Situation, Background, Assessment and  
Recommendations(SBAR). Information from the following report(s) SBAR and Procedure Summary was reviewed with the receiving nurse. Lines:  
Peripheral IV 01/07/19 Anterior;Right Antecubital (Active) Opportunity for questions and clarification was provided.

## 2019-03-14 RX ORDER — SODIUM CHLORIDE 0.9 % (FLUSH) 0.9 %
5-40 SYRINGE (ML) INJECTION EVERY 8 HOURS
Status: CANCELLED | OUTPATIENT
Start: 2019-03-14

## 2019-03-14 RX ORDER — SODIUM CHLORIDE 0.9 % (FLUSH) 0.9 %
5-40 SYRINGE (ML) INJECTION AS NEEDED
Status: CANCELLED | OUTPATIENT
Start: 2019-03-14

## 2019-03-20 ENCOUNTER — ANESTHESIA EVENT (OUTPATIENT)
Dept: SURGERY | Age: 69
End: 2019-03-20
Payer: MEDICARE

## 2019-03-21 ENCOUNTER — HOSPITAL ENCOUNTER (OUTPATIENT)
Age: 69
Setting detail: OUTPATIENT SURGERY
Discharge: HOME | End: 2019-03-21
Attending: ORTHOPAEDIC SURGERY | Admitting: ORTHOPAEDIC SURGERY
Payer: MEDICARE

## 2019-03-21 ENCOUNTER — ANESTHESIA (OUTPATIENT)
Dept: SURGERY | Age: 69
End: 2019-03-21
Payer: MEDICARE

## 2019-03-21 VITALS
HEART RATE: 83 BPM | BODY MASS INDEX: 26.45 KG/M2 | RESPIRATION RATE: 16 BRPM | DIASTOLIC BLOOD PRESSURE: 78 MMHG | OXYGEN SATURATION: 94 % | TEMPERATURE: 97.7 F | SYSTOLIC BLOOD PRESSURE: 139 MMHG | WEIGHT: 195 LBS

## 2019-03-21 PROCEDURE — 74011250636 HC RX REV CODE- 250/636: Performed by: ANESTHESIOLOGY

## 2019-03-21 PROCEDURE — 76210000020 HC REC RM PH II FIRST 0.5 HR: Performed by: ORTHOPAEDIC SURGERY

## 2019-03-21 PROCEDURE — 77030018836 HC SOL IRR NACL ICUM -A: Performed by: ORTHOPAEDIC SURGERY

## 2019-03-21 PROCEDURE — 77030000032 HC CUF TRNQT ZIMM -B: Performed by: ORTHOPAEDIC SURGERY

## 2019-03-21 PROCEDURE — 77030012411 HC DRN WND CARD -A: Performed by: ORTHOPAEDIC SURGERY

## 2019-03-21 PROCEDURE — 74011250636 HC RX REV CODE- 250/636: Performed by: ORTHOPAEDIC SURGERY

## 2019-03-21 PROCEDURE — 77030039266 HC ADH SKN EXOFIN S2SG -A: Performed by: ORTHOPAEDIC SURGERY

## 2019-03-21 PROCEDURE — 74011250636 HC RX REV CODE- 250/636

## 2019-03-21 PROCEDURE — 76060000032 HC ANESTHESIA 0.5 TO 1 HR: Performed by: ORTHOPAEDIC SURGERY

## 2019-03-21 PROCEDURE — 77030002933 HC SUT MCRYL J&J -A: Performed by: ORTHOPAEDIC SURGERY

## 2019-03-21 PROCEDURE — 77030002986 HC SUT PROL J&J -A: Performed by: ORTHOPAEDIC SURGERY

## 2019-03-21 PROCEDURE — 77030031139 HC SUT VCRL2 J&J -A: Performed by: ORTHOPAEDIC SURGERY

## 2019-03-21 PROCEDURE — 77030003602 HC NDL NRV BLK BBMI -B: Performed by: ANESTHESIOLOGY

## 2019-03-21 PROCEDURE — 76010010054 HC POST OP PAIN BLOCK: Performed by: ORTHOPAEDIC SURGERY

## 2019-03-21 PROCEDURE — 76942 ECHO GUIDE FOR BIOPSY: CPT | Performed by: ORTHOPAEDIC SURGERY

## 2019-03-21 PROCEDURE — 76010000160 HC OR TIME 0.5 TO 1 HR INTENSV-TIER 1: Performed by: ORTHOPAEDIC SURGERY

## 2019-03-21 RX ORDER — SODIUM CHLORIDE, SODIUM LACTATE, POTASSIUM CHLORIDE, CALCIUM CHLORIDE 600; 310; 30; 20 MG/100ML; MG/100ML; MG/100ML; MG/100ML
75 INJECTION, SOLUTION INTRAVENOUS CONTINUOUS
Status: DISCONTINUED | OUTPATIENT
Start: 2019-03-21 | End: 2019-03-21 | Stop reason: HOSPADM

## 2019-03-21 RX ORDER — CEFAZOLIN SODIUM/WATER 2 G/20 ML
2 SYRINGE (ML) INTRAVENOUS
Status: COMPLETED | OUTPATIENT
Start: 2019-03-21 | End: 2019-03-21

## 2019-03-21 RX ORDER — LIDOCAINE HYDROCHLORIDE 20 MG/ML
INJECTION, SOLUTION EPIDURAL; INFILTRATION; INTRACAUDAL; PERINEURAL AS NEEDED
Status: DISCONTINUED | OUTPATIENT
Start: 2019-03-21 | End: 2019-03-21 | Stop reason: HOSPADM

## 2019-03-21 RX ORDER — MIDAZOLAM HYDROCHLORIDE 1 MG/ML
5 INJECTION, SOLUTION INTRAMUSCULAR; INTRAVENOUS ONCE
Status: COMPLETED | OUTPATIENT
Start: 2019-03-21 | End: 2019-03-21

## 2019-03-21 RX ORDER — HYDROMORPHONE HYDROCHLORIDE 2 MG/ML
0.5 INJECTION, SOLUTION INTRAMUSCULAR; INTRAVENOUS; SUBCUTANEOUS
Status: DISCONTINUED | OUTPATIENT
Start: 2019-03-21 | End: 2019-03-21 | Stop reason: HOSPADM

## 2019-03-21 RX ORDER — PROPOFOL 10 MG/ML
INJECTION, EMULSION INTRAVENOUS AS NEEDED
Status: DISCONTINUED | OUTPATIENT
Start: 2019-03-21 | End: 2019-03-21 | Stop reason: HOSPADM

## 2019-03-21 RX ORDER — OXYCODONE HYDROCHLORIDE 5 MG/1
5 TABLET ORAL
Status: DISCONTINUED | OUTPATIENT
Start: 2019-03-21 | End: 2019-03-21 | Stop reason: HOSPADM

## 2019-03-21 RX ORDER — PROPOFOL 10 MG/ML
INJECTION, EMULSION INTRAVENOUS
Status: DISCONTINUED | OUTPATIENT
Start: 2019-03-21 | End: 2019-03-21 | Stop reason: HOSPADM

## 2019-03-21 RX ORDER — SODIUM CHLORIDE 0.9 % (FLUSH) 0.9 %
5-40 SYRINGE (ML) INJECTION EVERY 8 HOURS
Status: DISCONTINUED | OUTPATIENT
Start: 2019-03-21 | End: 2019-03-21 | Stop reason: HOSPADM

## 2019-03-21 RX ORDER — SODIUM CHLORIDE 0.9 % (FLUSH) 0.9 %
5-40 SYRINGE (ML) INJECTION AS NEEDED
Status: DISCONTINUED | OUTPATIENT
Start: 2019-03-21 | End: 2019-03-21 | Stop reason: HOSPADM

## 2019-03-21 RX ORDER — MIDAZOLAM HYDROCHLORIDE 1 MG/ML
2 INJECTION, SOLUTION INTRAMUSCULAR; INTRAVENOUS
Status: DISCONTINUED | OUTPATIENT
Start: 2019-03-21 | End: 2019-03-21 | Stop reason: HOSPADM

## 2019-03-21 RX ORDER — FENTANYL CITRATE 50 UG/ML
100 INJECTION, SOLUTION INTRAMUSCULAR; INTRAVENOUS ONCE
Status: COMPLETED | OUTPATIENT
Start: 2019-03-21 | End: 2019-03-21

## 2019-03-21 RX ORDER — HYDROCODONE BITARTRATE AND ACETAMINOPHEN 5; 325 MG/1; MG/1
2 TABLET ORAL AS NEEDED
Status: DISCONTINUED | OUTPATIENT
Start: 2019-03-21 | End: 2019-03-21 | Stop reason: HOSPADM

## 2019-03-21 RX ORDER — LIDOCAINE HYDROCHLORIDE 10 MG/ML
0.1 INJECTION INFILTRATION; PERINEURAL AS NEEDED
Status: DISCONTINUED | OUTPATIENT
Start: 2019-03-21 | End: 2019-03-21 | Stop reason: HOSPADM

## 2019-03-21 RX ADMIN — Medication 2 G: at 09:10

## 2019-03-21 RX ADMIN — PROPOFOL 30 MG: 10 INJECTION, EMULSION INTRAVENOUS at 09:11

## 2019-03-21 RX ADMIN — PROPOFOL 50 MG: 10 INJECTION, EMULSION INTRAVENOUS at 09:05

## 2019-03-21 RX ADMIN — FENTANYL CITRATE 50 MCG: 50 INJECTION INTRAMUSCULAR; INTRAVENOUS at 08:09

## 2019-03-21 RX ADMIN — PROPOFOL 75 MCG/KG/MIN: 10 INJECTION, EMULSION INTRAVENOUS at 09:09

## 2019-03-21 RX ADMIN — PROPOFOL 30 MG: 10 INJECTION, EMULSION INTRAVENOUS at 09:07

## 2019-03-21 RX ADMIN — MIDAZOLAM 3 MG: 1 INJECTION INTRAMUSCULAR; INTRAVENOUS at 08:09

## 2019-03-21 RX ADMIN — PROPOFOL 30 MG: 10 INJECTION, EMULSION INTRAVENOUS at 09:13

## 2019-03-21 RX ADMIN — SODIUM CHLORIDE, SODIUM LACTATE, POTASSIUM CHLORIDE, AND CALCIUM CHLORIDE 75 ML/HR: 600; 310; 30; 20 INJECTION, SOLUTION INTRAVENOUS at 07:00

## 2019-03-21 RX ADMIN — LIDOCAINE HYDROCHLORIDE 50 MG: 20 INJECTION, SOLUTION EPIDURAL; INFILTRATION; INTRACAUDAL; PERINEURAL at 09:05

## 2019-03-21 RX ADMIN — PROPOFOL 30 MG: 10 INJECTION, EMULSION INTRAVENOUS at 09:09

## 2019-03-21 NOTE — ANESTHESIA PROCEDURE NOTES
Peripheral Block Start time: 3/21/2019 8:10 AM 
End time: 3/21/2019 8:15 AM 
Performed by: Demario Disla MD 
Authorized by: Demario Disla MD  
 
 
Pre-procedure: Indications: at surgeon's request, post-op pain management and procedure for pain Preanesthetic Checklist: patient identified, risks and benefits discussed, site marked, timeout performed, anesthesia consent given and patient being monitored Timeout Time: 08:09 Block Type:  
Block Type:  Supraclavicular Laterality:  Left Monitoring:  Standard ASA monitoring, responsive to questions, oxygen, continuous pulse ox, heart rate and frequent vital sign checks Injection Technique:  Single shot Procedures: ultrasound guided and nerve stimulator Patient Position: seated Prep: chlorhexidine Location:  Interscalene Needle Type:  Stimuplex Needle Gauge:  22 G Needle Localization:  Nerve stimulator and ultrasound guidance Motor Response: minimal motor response >0.4 mA Assessment: 
Number of attempts:  1 Injection Assessment:  Incremental injection every 5 mL, no paresthesia, ultrasound image on chart, local visualized surrounding nerve on ultrasound, negative aspiration for blood and no intravascular symptoms Patient tolerance:  Patient tolerated the procedure well with no immediate complications

## 2019-03-21 NOTE — ANESTHESIA PREPROCEDURE EVALUATION
Anesthetic History No history of anesthetic complications Review of Systems / Medical History Patient summary reviewed, nursing notes reviewed and pertinent labs reviewed Pulmonary Smoker (recently quit Feb 2018) Neuro/Psych  
 
 
CVA (10/23/18 numbness remains in R hand) Cardiovascular Within defined limits Exercise tolerance: >4 METS 
  
GI/Hepatic/Renal 
Within defined limits Endo/Other Arthritis Other Findings Physical Exam 
 
Airway Mallampati: II 
 
 
Mouth opening: Normal 
 
 Cardiovascular Regular rate and rhythm,  S1 and S2 normal,  no murmur, click, rub, or gallop Dental 
No notable dental hx Pulmonary Breath sounds clear to auscultation Abdominal 
 
 
 
 Other Findings Anesthetic Plan ASA: 2 Anesthesia type: total IV anesthesia - femoral single shot and popliteal fossa block Post-op pain plan if not by surgeon: peripheral nerve block single Induction: Intravenous Anesthetic plan and risks discussed with: Patient and Spouse

## 2019-03-21 NOTE — ANESTHESIA POSTPROCEDURE EVALUATION
Procedure(s): ELBOW CUBITAL TUNNEL RELEASE WITH POSS TRANSPOSITION/ LEFT/ PLEXUS. regional 
 
Anesthesia Post Evaluation Multimodal analgesia: multimodal analgesia used between 6 hours prior to anesthesia start to PACU discharge Patient location during evaluation: bedside Patient participation: complete - patient participated Level of consciousness: awake and alert Pain score: 3 Pain management: adequate Airway patency: patent Anesthetic complications: no 
Cardiovascular status: acceptable and hemodynamically stable Respiratory status: acceptable Hydration status: acceptable Post anesthesia nausea and vomiting:  none Vitals Value Taken Time /69 3/21/2019 10:00 AM  
Temp 36.5 °C (97.7 °F) 3/21/2019  9:46 AM  
Pulse 80 3/21/2019 10:05 AM  
Resp 16 3/21/2019 10:00 AM  
SpO2 94 % 3/21/2019 10:05 AM  
Vitals shown include unvalidated device data.

## 2019-03-23 NOTE — OP NOTES
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 Procedures: Procedure(s):  ELBOW CUBITAL TUNNEL RELEASE INSITU/ LEFT/ PLEXUS     EBL/IV FLUIDS: Per Anesthesia. COMPLICATIONS: None. DISPOSITION: Stable to recovery room. 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 It was confirmed on preoperative nerve studies. After both operative and nonoperative treatment options were discussed, the decision was made to go ahead with a left cubital tunnel release. Risks and benefits of the procedure were discussed including, but not limited to bleeding, infection, injury to adjacent structures consisting of tendon, artery, and nerve, need for additional procedures, wound dehiscence, scar formation, incomplete resolution of symptoms, recurrence of symptoms, and transient neuropraxia. Informed consent was obtained. PROCEDURE IN DETAIL: The patient was seen and marked in the preoperative suite. The patient was taken back to the OR, placed on the table in supine position with left upper extremities on hand tables. Left upper extremities were prepped and draped in standard sterile fashion. A formal timeout was performed confirming patient identification, preoperative antibiotics, and planned operative procedure. We infiltrated both planned incision sites with lidocaine and Marcaine, exsanguinated the left upper extremity and the tourniquet was placed up to 250 mmHg. a standard medial incision overlying the ulnar groove was made dissecting down protecting cutaneous nerves.   We identified the mL proximal to the cubital tunnel released it proximally under direct vision. We then released distally through Engs fascia, through the cubital tunnel leaving a large anterior flap to prevent subluxation, as well as releasing through the FCU protecting the motor branch of the FCU. After complete release was performed. Flexion-extension showed no significant subluxation of ulnar nerve was decompressed proximally and distally subcutaneously from the incision. We irrigated copiously with normal saline. The incision was closed with Monocryl and Dermabond glue. The tourniquet was let down, the fingers had brisk capillary refill and a soft sterile dressing was placed. POSTOPERATIVE CARE: Early motion. No heavy lifting.  Followup in 2 weeks for suture removal.    Closure: Primary    Complications: None     Signed By: Reji Burris MD

## 2019-04-22 RX ORDER — SODIUM CHLORIDE 0.9 % (FLUSH) 0.9 %
5-40 SYRINGE (ML) INJECTION AS NEEDED
Status: CANCELLED | OUTPATIENT
Start: 2019-04-22

## 2019-04-22 RX ORDER — SODIUM CHLORIDE 0.9 % (FLUSH) 0.9 %
5-40 SYRINGE (ML) INJECTION EVERY 8 HOURS
Status: CANCELLED | OUTPATIENT
Start: 2019-04-22

## 2019-05-08 ENCOUNTER — ANESTHESIA EVENT (OUTPATIENT)
Dept: SURGERY | Age: 69
End: 2019-05-08
Payer: MEDICARE

## 2019-05-09 ENCOUNTER — HOSPITAL ENCOUNTER (OUTPATIENT)
Age: 69
Setting detail: OUTPATIENT SURGERY
Discharge: HOME | End: 2019-05-09
Attending: ORTHOPAEDIC SURGERY | Admitting: ORTHOPAEDIC SURGERY
Payer: MEDICARE

## 2019-05-09 ENCOUNTER — ANESTHESIA (OUTPATIENT)
Dept: SURGERY | Age: 69
End: 2019-05-09
Payer: MEDICARE

## 2019-05-09 VITALS
BODY MASS INDEX: 25.73 KG/M2 | HEIGHT: 72 IN | DIASTOLIC BLOOD PRESSURE: 71 MMHG | HEART RATE: 64 BPM | OXYGEN SATURATION: 95 % | WEIGHT: 190 LBS | SYSTOLIC BLOOD PRESSURE: 113 MMHG | TEMPERATURE: 97.8 F | RESPIRATION RATE: 16 BRPM

## 2019-05-09 LAB — POTASSIUM BLD-SCNC: 3.6 MMOL/L (ref 3.5–5.1)

## 2019-05-09 PROCEDURE — 76010010054 HC POST OP PAIN BLOCK: Performed by: ORTHOPAEDIC SURGERY

## 2019-05-09 PROCEDURE — 76210000020 HC REC RM PH II FIRST 0.5 HR: Performed by: ORTHOPAEDIC SURGERY

## 2019-05-09 PROCEDURE — 74011250636 HC RX REV CODE- 250/636: Performed by: ORTHOPAEDIC SURGERY

## 2019-05-09 PROCEDURE — A4565 SLINGS: HCPCS | Performed by: ORTHOPAEDIC SURGERY

## 2019-05-09 PROCEDURE — 77030039266 HC ADH SKN EXOFIN S2SG -A: Performed by: ORTHOPAEDIC SURGERY

## 2019-05-09 PROCEDURE — 84132 ASSAY OF SERUM POTASSIUM: CPT

## 2019-05-09 PROCEDURE — 76942 ECHO GUIDE FOR BIOPSY: CPT | Performed by: ORTHOPAEDIC SURGERY

## 2019-05-09 PROCEDURE — 77030040356 HC CORD BPLR FRCP COVD -A: Performed by: ORTHOPAEDIC SURGERY

## 2019-05-09 PROCEDURE — 77030003602 HC NDL NRV BLK BBMI -B: Performed by: ANESTHESIOLOGY

## 2019-05-09 PROCEDURE — 76210000063 HC OR PH I REC FIRST 0.5 HR: Performed by: ORTHOPAEDIC SURGERY

## 2019-05-09 PROCEDURE — 77030000032 HC CUF TRNQT ZIMM -B: Performed by: ORTHOPAEDIC SURGERY

## 2019-05-09 PROCEDURE — 74011250636 HC RX REV CODE- 250/636

## 2019-05-09 PROCEDURE — 76060000032 HC ANESTHESIA 0.5 TO 1 HR: Performed by: ORTHOPAEDIC SURGERY

## 2019-05-09 PROCEDURE — 77030018836 HC SOL IRR NACL ICUM -A: Performed by: ORTHOPAEDIC SURGERY

## 2019-05-09 PROCEDURE — 77030002933 HC SUT MCRYL J&J -A: Performed by: ORTHOPAEDIC SURGERY

## 2019-05-09 PROCEDURE — 76010000160 HC OR TIME 0.5 TO 1 HR INTENSV-TIER 1: Performed by: ORTHOPAEDIC SURGERY

## 2019-05-09 PROCEDURE — 74011250636 HC RX REV CODE- 250/636: Performed by: ANESTHESIOLOGY

## 2019-05-09 RX ORDER — PROPOFOL 10 MG/ML
INJECTION, EMULSION INTRAVENOUS
Status: DISCONTINUED | OUTPATIENT
Start: 2019-05-09 | End: 2019-05-09 | Stop reason: HOSPADM

## 2019-05-09 RX ORDER — NALOXONE HYDROCHLORIDE 0.4 MG/ML
0.2 INJECTION, SOLUTION INTRAMUSCULAR; INTRAVENOUS; SUBCUTANEOUS AS NEEDED
Status: DISCONTINUED | OUTPATIENT
Start: 2019-05-09 | End: 2019-05-09 | Stop reason: HOSPADM

## 2019-05-09 RX ORDER — SODIUM CHLORIDE, SODIUM LACTATE, POTASSIUM CHLORIDE, CALCIUM CHLORIDE 600; 310; 30; 20 MG/100ML; MG/100ML; MG/100ML; MG/100ML
75 INJECTION, SOLUTION INTRAVENOUS CONTINUOUS
Status: DISCONTINUED | OUTPATIENT
Start: 2019-05-09 | End: 2019-05-09 | Stop reason: HOSPADM

## 2019-05-09 RX ORDER — MIDAZOLAM HYDROCHLORIDE 1 MG/ML
2 INJECTION, SOLUTION INTRAMUSCULAR; INTRAVENOUS ONCE
Status: COMPLETED | OUTPATIENT
Start: 2019-05-09 | End: 2019-05-09

## 2019-05-09 RX ORDER — LIDOCAINE HYDROCHLORIDE 10 MG/ML
0.1 INJECTION INFILTRATION; PERINEURAL AS NEEDED
Status: DISCONTINUED | OUTPATIENT
Start: 2019-05-09 | End: 2019-05-09 | Stop reason: HOSPADM

## 2019-05-09 RX ORDER — PROPOFOL 10 MG/ML
INJECTION, EMULSION INTRAVENOUS AS NEEDED
Status: DISCONTINUED | OUTPATIENT
Start: 2019-05-09 | End: 2019-05-09 | Stop reason: HOSPADM

## 2019-05-09 RX ORDER — CEFAZOLIN SODIUM/WATER 2 G/20 ML
2 SYRINGE (ML) INTRAVENOUS ONCE
Status: COMPLETED | OUTPATIENT
Start: 2019-05-09 | End: 2019-05-09

## 2019-05-09 RX ORDER — FENTANYL CITRATE 50 UG/ML
100 INJECTION, SOLUTION INTRAMUSCULAR; INTRAVENOUS ONCE
Status: COMPLETED | OUTPATIENT
Start: 2019-05-09 | End: 2019-05-09

## 2019-05-09 RX ORDER — MIDAZOLAM HYDROCHLORIDE 1 MG/ML
2 INJECTION, SOLUTION INTRAMUSCULAR; INTRAVENOUS
Status: DISCONTINUED | OUTPATIENT
Start: 2019-05-09 | End: 2019-05-09 | Stop reason: HOSPADM

## 2019-05-09 RX ORDER — LIDOCAINE HYDROCHLORIDE 20 MG/ML
INJECTION, SOLUTION EPIDURAL; INFILTRATION; INTRACAUDAL; PERINEURAL AS NEEDED
Status: DISCONTINUED | OUTPATIENT
Start: 2019-05-09 | End: 2019-05-09 | Stop reason: HOSPADM

## 2019-05-09 RX ORDER — NALOXONE HYDROCHLORIDE 0.4 MG/ML
0.2 INJECTION, SOLUTION INTRAMUSCULAR; INTRAVENOUS; SUBCUTANEOUS
Status: DISCONTINUED | OUTPATIENT
Start: 2019-05-09 | End: 2019-05-09 | Stop reason: HOSPADM

## 2019-05-09 RX ORDER — ROPIVACAINE HYDROCHLORIDE 5 MG/ML
INJECTION, SOLUTION EPIDURAL; INFILTRATION; PERINEURAL
Status: COMPLETED | OUTPATIENT
Start: 2019-05-09 | End: 2019-05-09

## 2019-05-09 RX ORDER — SODIUM CHLORIDE, SODIUM LACTATE, POTASSIUM CHLORIDE, CALCIUM CHLORIDE 600; 310; 30; 20 MG/100ML; MG/100ML; MG/100ML; MG/100ML
25 INJECTION, SOLUTION INTRAVENOUS CONTINUOUS
Status: DISCONTINUED | OUTPATIENT
Start: 2019-05-09 | End: 2019-05-09 | Stop reason: HOSPADM

## 2019-05-09 RX ORDER — HYDROMORPHONE HYDROCHLORIDE 2 MG/ML
0.5 INJECTION, SOLUTION INTRAMUSCULAR; INTRAVENOUS; SUBCUTANEOUS
Status: DISCONTINUED | OUTPATIENT
Start: 2019-05-09 | End: 2019-05-09 | Stop reason: HOSPADM

## 2019-05-09 RX ORDER — SODIUM CHLORIDE 0.9 % (FLUSH) 0.9 %
5-40 SYRINGE (ML) INJECTION AS NEEDED
Status: DISCONTINUED | OUTPATIENT
Start: 2019-05-09 | End: 2019-05-09 | Stop reason: HOSPADM

## 2019-05-09 RX ORDER — ONDANSETRON 2 MG/ML
4 INJECTION INTRAMUSCULAR; INTRAVENOUS
Status: DISCONTINUED | OUTPATIENT
Start: 2019-05-09 | End: 2019-05-09 | Stop reason: HOSPADM

## 2019-05-09 RX ORDER — OXYCODONE HYDROCHLORIDE 5 MG/1
5 TABLET ORAL
Status: DISCONTINUED | OUTPATIENT
Start: 2019-05-09 | End: 2019-05-09 | Stop reason: HOSPADM

## 2019-05-09 RX ORDER — SODIUM CHLORIDE 0.9 % (FLUSH) 0.9 %
5-40 SYRINGE (ML) INJECTION EVERY 8 HOURS
Status: DISCONTINUED | OUTPATIENT
Start: 2019-05-09 | End: 2019-05-09 | Stop reason: HOSPADM

## 2019-05-09 RX ADMIN — Medication 2 G: at 07:38

## 2019-05-09 RX ADMIN — FENTANYL CITRATE 100 MCG: 50 INJECTION INTRAMUSCULAR; INTRAVENOUS at 06:54

## 2019-05-09 RX ADMIN — MIDAZOLAM HYDROCHLORIDE 2 MG: 2 INJECTION, SOLUTION INTRAMUSCULAR; INTRAVENOUS at 06:54

## 2019-05-09 RX ADMIN — SODIUM CHLORIDE, SODIUM LACTATE, POTASSIUM CHLORIDE, AND CALCIUM CHLORIDE 25 ML/HR: 600; 310; 30; 20 INJECTION, SOLUTION INTRAVENOUS at 05:45

## 2019-05-09 RX ADMIN — ROPIVACAINE HYDROCHLORIDE 20 ML: 5 INJECTION, SOLUTION EPIDURAL; INFILTRATION; PERINEURAL at 06:58

## 2019-05-09 RX ADMIN — LIDOCAINE HYDROCHLORIDE 40 MG: 20 INJECTION, SOLUTION EPIDURAL; INFILTRATION; INTRACAUDAL; PERINEURAL at 07:35

## 2019-05-09 RX ADMIN — PROPOFOL 50 MG: 10 INJECTION, EMULSION INTRAVENOUS at 07:35

## 2019-05-09 RX ADMIN — PROPOFOL 160 MCG/KG/MIN: 10 INJECTION, EMULSION INTRAVENOUS at 07:35

## 2019-05-09 NOTE — ANESTHESIA POSTPROCEDURE EVALUATION
Procedure(s): RIGHT INSITU CUBTIAL TUNNEL RELEASE. 
 
total IV anesthesia Anesthesia Post Evaluation Multimodal analgesia: multimodal analgesia used between 6 hours prior to anesthesia start to PACU discharge Patient location during evaluation: bedside Patient participation: complete - patient participated Level of consciousness: awake and alert Pain score: 1 Pain management: adequate Airway patency: patent Anesthetic complications: no 
Cardiovascular status: acceptable Respiratory status: acceptable Hydration status: acceptable Comments: Pt doing well. Ok to d/c home. Post anesthesia nausea and vomiting:  none Vitals Value Taken Time /77 5/9/2019  8:41 AM  
Temp 36.6 °C (97.8 °F) 5/9/2019  8:28 AM  
Pulse 67 5/9/2019  8:42 AM  
Resp 14 5/9/2019  8:28 AM  
SpO2 94 % 5/9/2019  8:41 AM  
Vitals shown include unvalidated device data.

## 2019-05-09 NOTE — ANESTHESIA PREPROCEDURE EVALUATION
Anesthetic History No history of anesthetic complications Review of Systems / Medical History Patient summary reviewed, nursing notes reviewed and pertinent labs reviewed Pulmonary Smoker (recently quit Feb 2018) Neuro/Psych  
 
 
CVA (10/23/18 numbness remains in R hand) Psychiatric history (Anxiety) Cardiovascular Hypertension: well controlled Exercise tolerance: >4 METS Comments: Denies CP, SOB or changes in functional status Nml EF 1/2019 GI/Hepatic/Renal 
Within defined limits Endo/Other Arthritis Other Findings Physical Exam 
 
Airway Mallampati: II 
 
 
Mouth opening: Normal 
 
 Cardiovascular Regular rate and rhythm,  S1 and S2 normal,  no murmur, click, rub, or gallop Dental 
No notable dental hx Pulmonary Breath sounds clear to auscultation Abdominal 
GI exam deferred Other Findings Anesthetic Plan ASA: 2 Anesthesia type: total IV anesthesia Post-op pain plan if not by surgeon: peripheral nerve block single Induction: Intravenous Anesthetic plan and risks discussed with: Patient and Spouse Pt has KNOWN R HAND AND ARM NUMBNESS+WEAKNESS.

## 2019-05-09 NOTE — OP NOTES
Operative Report       DOS:  5/9/19  Preoperative diagnosis:  Ulnar neuropathy of right upper extremity [G56.21]    Postoperative diagnosis: Ulnar neuropathy of right upper extremity [G56.21]    Surgeon(s) and Role:     * Akin Cunningham MD - Primary     Anesthesia: Regional Local with MAC. Procedures: Procedure(s):  RIGHT INSITU CUBTIAL TUNNEL RELEASE     EBL/IV FLUIDS: Per Anesthesia.      COMPLICATIONS: None.      DISPOSITION: Stable to recovery room.      INDICATIONS FOR PROCEDURE: The patient is a pleasant 81year-old male with history of right cubital syndrome that has failed nonoperative measures. It was confirmed on preoperative nerve studies. After both operative and nonoperative treatment options were discussed, the decision was made to go ahead with a right cubital tunnel release. Risks and benefits of the procedure were discussed including, but not limited to bleeding, infection, injury to adjacent structures consisting of tendon, artery, and nerve, need for additional procedures, wound dehiscence, scar formation, incomplete resolution of symptoms, recurrence of symptoms, and transient neuropraxia. Informed consent was obtained.      PROCEDURE IN DETAIL: The patient was seen and marked in the preoperative suite. The patient was taken back to the OR, placed on the table in supine position with right upper extremities on hand tables. Right upper extremities was prepped and draped in standard sterile fashion. A formal timeout was performed confirming patient identification, preoperative antibiotics, and planned operative procedure.     We exsanguinated the right upper extremity and the tourniquet was placed up to 250 mmHg. a standard medial incision overlying the ulnar groove was made dissecting down protecting cutaneous nerves. We identified the nerve proximal to the cubital tunnel released it proximally under direct vision.   We then released distally through Engs fascia, through the cubital tunnel leaving a large anterior flap to prevent subluxation, as well as releasing through the FCU protecting the motor branch of the FCU. After complete release was performed. Flexion-extension showed no significant subluxation of ulnar nerve was decompressed proximally and distally subcutaneously proximal and distal to the incision. We irrigated copiously with normal saline. The incision was closed with Monocryl and Dermabond glue. The tourniquet was let down, the fingers had brisk capillary refill and a soft sterile dressing was placed. POSTOPERATIVE CARE: Early motion. No heavy lifting.  Followup in 2 weeks for suture removal.        Closure: Primary    Complications: None     Signed By: Yonatan Qureshi MD

## 2019-05-09 NOTE — BRIEF OP NOTE
BRIEF OPERATIVE NOTE Date of Procedure: 5/9/2019 Preoperative Diagnosis: Ulnar neuropathy of right upper extremity [G56.21] Postoperative Diagnosis: Ulnar neuropathy of right upper extremity [G56.21] Procedure(s): RIGHT INSITU CUBTIAL TUNNEL RELEASE Surgeon(s) and Role: Priti Waddell MD - Primary Surgical Staff: 
Circ-1: Rafi Adhikari RN 
Circ-2: Griffin Backisiah Scrub Tech-1: Quan King Event Time In Time Out Incision Start 2814 Incision Close 6936 Anesthesia: Regional  
Estimated Blood Loss: MINIMAL Specimens: * No specimens in log * Findings: SEE DICTATION Complications: NONE Implants: * No implants in log *

## 2019-05-09 NOTE — ANESTHESIA PROCEDURE NOTES
Peripheral Block    Start time: 5/9/2019 6:54 AM  End time: 5/9/2019 6:58 AM  Performed by: Eligio Montiel MD  Authorized by: Eligio Montiel MD       Pre-procedure: Indications: at surgeon's request and post-op pain management    Preanesthetic Checklist: patient identified, risks and benefits discussed, site marked, timeout performed, anesthesia consent given and patient being monitored    Timeout Time: 06:54          Block Type:   Block Type:  Supraclavicular  Laterality:  Right  Monitoring:  Standard ASA monitoring, responsive to questions, continuous pulse ox, oxygen, frequent vital sign checks and heart rate  Injection Technique:  Single shot  Procedures: ultrasound guided and nerve stimulator    Patient Position: supine  Prep: chlorhexidine    Location:  Supraclavicular  Needle Type:  Stimuplex  Needle Gauge:  22 G  Needle Localization:  Nerve stimulator and ultrasound guidance  Motor Response: minimal motor response >0.4 mA      Assessment:  Number of attempts:  1  Injection Assessment:  Incremental injection every 5 mL, negative aspiration for CSF, ultrasound image on chart, no paresthesia, local visualized surrounding nerve on ultrasound, negative aspiration for blood and no intravascular symptoms  Patient tolerance:  Patient tolerated the procedure well with no immediate complications  5 mL Ropivacaine placed in the distribution of the intercostal brachial nerve.

## 2019-05-09 NOTE — DISCHARGE INSTRUCTIONS
Keep dressing clean, dry and intact until post op day number 3-4. Then may shower, pat dry and keep covered until follow up. Do not scrub incision or submerge under water. Move fingers and elbow, elevate, and ice to prevent swelling. No heavy lifting. DIET  · Clear liquids until no nausea or vomiting; then light diet for the first day. · Advance to regular diet on second day, unless your doctor orders otherwise. · If nausea and vomiting continues, call your doctor. PAIN  · Take pain medication as directed by your doctor. · Call your doctor if pain is NOT relieved by medication. CALL YOUR DOCTOR IF   · Excessive bleeding that does not stop after holding pressure over the area  · Temperature of 101 degrees F or above  · Excessive redness, swelling or bruising, and/ or green or yellow, smelly discharge from incision    AFTER ANESTHESIA   · For the first 24 hours: DO NOT Drive, Drink alcoholic beverages, or Make important decisions. · Be aware of dizziness following anesthesia and while taking pain medication. APPOINTMENT DATE/ TIME See sheet    YOUR DOCTOR'S PHONE NUMBER [phone]      DISCHARGE SUMMARY from Nurse    PATIENT INSTRUCTIONS:    After general anesthesia or intravenous sedation, for 24 hours or while taking prescription Narcotics:  · Limit your activities  · Do not drive and operate hazardous machinery  · Do not make important personal or business decisions  · Do  not drink alcoholic beverages  · If you have not urinated within 8 hours after discharge, please contact your surgeon on call. *  Please give a list of your current medications to your Primary Care Provider. *  Please update this list whenever your medications are discontinued, doses are      changed, or new medications (including over-the-counter products) are added. *  Please carry medication information at all times in case of emergency situations.       These are general instructions for a healthy lifestyle:    No smoking/ No tobacco products/ Avoid exposure to second hand smoke    Surgeon General's Warning:  Quitting smoking now greatly reduces serious risk to your health. Obesity, smoking, and sedentary lifestyle greatly increases your risk for illness    A healthy diet, regular physical exercise & weight monitoring are important for maintaining a healthy lifestyle    You may be retaining fluid if you have a history of heart failure or if you experience any of the following symptoms:  Weight gain of 3 pounds or more overnight or 5 pounds in a week, increased swelling in our hands or feet or shortness of breath while lying flat in bed. Please call your doctor as soon as you notice any of these symptoms; do not wait until your next office visit. Recognize signs and symptoms of STROKE:    F-face looks uneven    A-arms unable to move or move unevenly    S-speech slurred or non-existent    T-time-call 911 as soon as signs and symptoms begin-DO NOT go       Back to bed or wait to see if you get better-TIME IS BRAIN.

## 2019-10-15 PROBLEM — Z79.899 ENCOUNTER FOR LONG-TERM CURRENT USE OF MEDICATION: Status: ACTIVE | Noted: 2019-10-15

## 2019-12-13 PROBLEM — E21.0 PRIMARY HYPERPARATHYROIDISM (HCC): Status: ACTIVE | Noted: 2019-12-13

## 2020-02-12 ENCOUNTER — HOSPITAL ENCOUNTER (OUTPATIENT)
Dept: MAMMOGRAPHY | Age: 70
Discharge: HOME | End: 2020-02-12
Attending: INTERNAL MEDICINE
Payer: MEDICARE

## 2020-02-12 DIAGNOSIS — E21.0 PRIMARY HYPERPARATHYROIDISM (HCC): ICD-10-CM

## 2020-02-12 PROCEDURE — 77080 DXA BONE DENSITY AXIAL: CPT

## 2020-03-31 PROBLEM — E87.1 HYPONATREMIA: Status: ACTIVE | Noted: 2020-03-31

## 2020-03-31 PROBLEM — M85.80 OSTEOPENIA: Status: ACTIVE | Noted: 2020-03-31

## 2020-03-31 PROBLEM — E55.9 VITAMIN D DEFICIENCY: Status: ACTIVE | Noted: 2020-03-31

## 2020-07-07 PROBLEM — M19.90 OSTEOARTHRITIS: Status: ACTIVE | Noted: 2020-07-07

## 2020-08-17 ENCOUNTER — HOSPITAL ENCOUNTER (OUTPATIENT)
Dept: PHYSICAL THERAPY | Age: 70
Discharge: HOME | End: 2020-08-17
Payer: MEDICARE

## 2020-08-17 ENCOUNTER — HOSPITAL ENCOUNTER (OUTPATIENT)
Dept: SURGERY | Age: 70
Discharge: HOME | End: 2020-08-17
Payer: MEDICARE

## 2020-08-17 DIAGNOSIS — R06.83 SNORING: Primary | ICD-10-CM

## 2020-08-17 LAB
ANION GAP SERPL CALC-SCNC: 6 MMOL/L (ref 7–16)
APTT PPP: 26.3 SEC (ref 24.3–35.4)
BACTERIA SPEC CULT: NORMAL
BASOPHILS # BLD: 0.1 K/UL (ref 0–0.2)
BASOPHILS NFR BLD: 1 % (ref 0–2)
BUN SERPL-MCNC: 12 MG/DL (ref 8–23)
CALCIUM SERPL-MCNC: 9.9 MG/DL (ref 8.3–10.4)
CHLORIDE SERPL-SCNC: 104 MMOL/L (ref 98–107)
CO2 SERPL-SCNC: 27 MMOL/L (ref 21–32)
CREAT SERPL-MCNC: 0.71 MG/DL (ref 0.8–1.5)
DIFFERENTIAL METHOD BLD: ABNORMAL
EOSINOPHIL # BLD: 0.2 K/UL (ref 0–0.8)
EOSINOPHIL NFR BLD: 2 % (ref 0.5–7.8)
ERYTHROCYTE [DISTWIDTH] IN BLOOD BY AUTOMATED COUNT: 12.6 % (ref 11.9–14.6)
EST. AVERAGE GLUCOSE BLD GHB EST-MCNC: 117 MG/DL
GLUCOSE SERPL-MCNC: 88 MG/DL (ref 65–100)
HBA1C MFR BLD: 5.7 %
HCT VFR BLD AUTO: 50.2 % (ref 41.1–50.3)
HGB BLD-MCNC: 17.2 G/DL (ref 13.6–17.2)
IMM GRANULOCYTES # BLD AUTO: 0 K/UL (ref 0–0.5)
IMM GRANULOCYTES NFR BLD AUTO: 0 % (ref 0–5)
INR PPP: 1
LYMPHOCYTES # BLD: 1.2 K/UL (ref 0.5–4.6)
LYMPHOCYTES NFR BLD: 15 % (ref 13–44)
MCH RBC QN AUTO: 33.5 PG (ref 26.1–32.9)
MCHC RBC AUTO-ENTMCNC: 34.3 G/DL (ref 31.4–35)
MCV RBC AUTO: 97.7 FL (ref 79.6–97.8)
MONOCYTES # BLD: 0.8 K/UL (ref 0.1–1.3)
MONOCYTES NFR BLD: 10 % (ref 4–12)
NEUTS SEG # BLD: 5.7 K/UL (ref 1.7–8.2)
NEUTS SEG NFR BLD: 72 % (ref 43–78)
NRBC # BLD: 0 K/UL (ref 0–0.2)
PLATELET # BLD AUTO: 230 K/UL (ref 150–450)
PMV BLD AUTO: 10.2 FL (ref 9.4–12.3)
POTASSIUM SERPL-SCNC: 4.1 MMOL/L (ref 3.5–5.1)
PROTHROMBIN TIME: 13.4 SEC (ref 12–14.7)
RBC # BLD AUTO: 5.14 M/UL (ref 4.23–5.6)
SERVICE CMNT-IMP: NORMAL
SODIUM SERPL-SCNC: 137 MMOL/L (ref 136–145)
WBC # BLD AUTO: 8 K/UL (ref 4.3–11.1)

## 2020-08-17 PROCEDURE — 87641 MR-STAPH DNA AMP PROBE: CPT

## 2020-08-17 PROCEDURE — 85025 COMPLETE CBC W/AUTO DIFF WBC: CPT

## 2020-08-17 PROCEDURE — 97161 PT EVAL LOW COMPLEX 20 MIN: CPT

## 2020-08-17 PROCEDURE — 83036 HEMOGLOBIN GLYCOSYLATED A1C: CPT

## 2020-08-17 PROCEDURE — 36415 COLL VENOUS BLD VENIPUNCTURE: CPT

## 2020-08-17 PROCEDURE — 85610 PROTHROMBIN TIME: CPT

## 2020-08-17 PROCEDURE — 80048 BASIC METABOLIC PNL TOTAL CA: CPT

## 2020-08-17 PROCEDURE — 85730 THROMBOPLASTIN TIME PARTIAL: CPT

## 2020-08-17 PROCEDURE — 77030027138 HC INCENT SPIROMETER -A

## 2020-08-17 NOTE — PROGRESS NOTES
Eben Arzola  : 1950(70 y.o.) [address] at [facility]  [address], [address]  [phone]       Physical Therapy Prehab Plan of Treatment and Evaluation Summary:2020    ICD-10: Treatment Diagnosis:   · Pain in Right Hip (M25.551)  · Stiffness of Right Hip, Not elsewhere classified (M25.651)  Precautions/Allergies:   Patient has no known allergies. MEDICAL/REFERRING DIAGNOSIS:  Unilateral primary osteoarthritis, right hip [M16.11]  REFERRING PHYSICIAN: Tito Garcia,*  DATE OF SURGERY: 9/3/20    Assessment:   Comments:  Reports B hip pain but states R is worse than L. Scheduled for R anterior KASANDRA and may go home day of surgery per patient. Independent with ADLs. Occasional use of cane outside of home but did not bring it to prehab. Reports increased pain and decreased hip ROM in the afternoon/evening. PROBLEM LIST (Impacting functional limitations):  Mr. Lauryn Espinoza presents with the following right lower extremity(s) problems:  1. Gait  2. Range of Motion  3. Home Exercise Program  4. Pain   INTERVENTIONS PLANNED:  1. Home Exercise Program  2. Educational Discussion      TREATMENT PLAN: Effective Dates: 2020 TO 2020. Frequency/Duration: Patient to continue to perform home exercise program at least twice per day up until his surgery. GOALS: (Goals have been discussed and agreed upon with patient.)  Discharge Goals: Time Frame: 1 Day  1. Patient will demonstrate independence with a home exercise program designed to increase functional technique and pain control to minimize functional deficits and optimize patient for total joint replacement. Rehabilitation Potential For Stated Goals: Good  Regarding Bethel Ahn's therapy, I certify that the treatment plan above will be carried out by a therapist or under their direction.   Thank you for this referral,  Kalli Neil, PT               HISTORY:   Present Symptoms:  Pain Intensity 1: 2(2 currently; high of 9)  Pain Location 1: Hip  Pain Orientation 1: Right   History of Present Injury/Illness (Reason for Referral):  Medical/Referring Diagnosis: Unilateral primary osteoarthritis, right hip [M16.11]   Past Medical History/Comorbidities:   Mr. Kristal Da Silva  has a past medical history of Actinic keratosis, Adverse effect of anesthesia, CVA (cerebral vascular accident) [facility]), Erectile dysfunction, Former smoker, Hypertension, Osteoarthritis, Pain in joint involving pelvic region and thigh, Psychiatric disorder, and Rosacea. He also has no past medical history of Malignant hyperthermia due to anesthesia or Pseudocholinesterase deficiency. Mr. Kristal Da Silva  has a past surgical history that includes hx colonoscopy; hx shoulder arthroscopy (Right, 2011); hx rotator cuff repair (Left, 2013); hx orthopaedic (Right, 03/09/2018); and hx orthopaedic (Left, 03/2019).   Social History/Living Environment:   Home Environment: Private residence  # Steps to Enter: 3  Rails to Enter: Yes  Hand Rails : Bilateral  One/Two Story Residence: One story  Living Alone: No  Support Systems: Spouse/Significant Other/Partner  Patient Expects to be Discharged to[de-identified] Private residence  Current DME Used/Available at Home: Adaptive dressing aides, Cane, straight, Raised toilet seat  Tub or Shower Type: Tub/Shower combination  Work/Activity:  retired  Dominant Side:  RIGHT  Current Medications:  See Pre-assessment nursing note   Number of Personal Factors/Comorbidities that affect the Plan of Care: 0: LOW COMPLEXITY   EXAMINATION:   ADLs (Current Functional Status):   Ambulation:  [x] Independent  [] Walk Indoors Only  [] Walk Outdoors  [x] Use Assistive Device- occasional use of cane outside of home  [] Use Wheelchair Only Dressing:  [x] Independent  Requires Assistance from Someone for:  [] Sock/Shoes  [] Pants  [] Everything   Bathing/Showering:   [x] Independent  [] Requires Assistance from Someone  [] Sponge Bath Only Household Activities:  [x] Routine house and yard work  [] Light Housework Only  [] None   Observation/Orthostatic Postural Assessment:       ROM/Flexibility:   AROM: Within functional limits(B hips decreased)                LLE AROM  L Hip Flexion: 130  L Hip ABduction: 25      RLE AROM  R Hip Flexion: 130  R Hip ABduction: 20   Strength:   Strength: Within functional limits                  Functional Mobility:         Stand to Sit: Independent  Sit to Stand: Independent  Distance (ft): 250 Feet (ft)  Ambulation - Level of Assistance: Independent  Stance: Right decreased  Gait Abnormalities: Antalgic          Balance:    Sitting: Intact  Standing: Intact   Body Structures Involved:  1. Bones  2. Joints  3. Muscles Body Functions Affected:  1. Neuromusculoskeletal  2. Movement Related Activities and Participation Affected:  1. General Tasks and Demands  2. Mobility   Number of elements that affect the Plan of Care: 3: MODERATE COMPLEXITY   CLINICAL PRESENTATION:   Presentation: Stable and uncomplicated: LOW COMPLEXITY   CLINICAL DECISION MAKING:   Outcome Measure: Tool Used: Lower Extremity Functional Scale (LEFS)  Score:  Initial: 18/80 Most Recent: X/80 (Date: -- )   Interpretation of Score: 20 questions each scored on a 5 point scale with 0 representing \"extreme difficulty or unable to perform\" and 4 representing \"no difficulty\". The lower the score, the greater the functional disability. 80/80 represents no disability. Minimal detectable change is 9 points. Medical Necessity:   · Mr. Shantanu Mccarty is expected to optimize his lower extremity strength and ROM in preparation for joint replacement surgery. Reason for Services/Other Comments:  · Achieve baseline assesment of musculoskeletal system, functional mobility and home environment. , educate in PT HEP in preparation for surgery, educate in hospital plan of care.    Use of outcome tool(s) and clinical judgement create a POC that gives a: Clear prediction of patient's progress: LOW COMPLEXITY   TREATMENT:   Treatment/Session Assessment:  Patient was instructed in PT- HEP to increase strength and ROM in LEs. Answered all questions. · Post session pain:  2  · Compliance with Program/Exercises: anticipate compliance.   Total Treatment Duration:  PT Patient Time In/Time Out  Time In: 1300  Time Out: 12 [facility]

## 2020-08-17 NOTE — PERIOP NOTES
Patient verified name and . Order for consent was found in EHR and matches case posting; patient verified. Type 3 surgery, PAT joint camp assessment complete. Labs per surgeon: CBC,BMP, PT/PTT, HgbA1c and MRSA swab ; results pending  Labs per anesthesia protocol: no additional  EKG:Done 2020 and within anesthesia guidelines. EKG tracing found in Chart review for anesthesia reference. Pt has loop recorder. Last ECHO (MADELEINE) report (2020) found in Chart Review for anesthesia reference. LVEF 55-60%. A negative Covid swab result is required to proceed with surgery; The testing center is located at the [address], [place]. An appointment is required therefore the patient will be contacted by the Covid swab team. The testing clinic is closed from  for lunch and on weekends. For questions or concerns the patient should call [phone]. Appointment date/time 2020 found in EHR and provided to patient. MRSA/MSSA swab collected; pharmacy to review and dose antibiotic as appropriate. Hospital approved surgical skin cleanser and instructions to return bottle on DOS given per hospital policy. Patient provided with handouts including Guide to Surgery, Pain Management, Hand Hygiene, Blood Transfusion Education, and [place] [facility] Brochure. Patient answered medical/surgical history questions at their best of ability. All prior to admission medications documented in [facility]. Original medication prescription bottle not visualized during patient appointment. Patient instructed to hold all vitamins 3 weeks prior to surgery and NSAIDS 5 days prior to surgery. Patient teach back successful and patient demonstrates knowledge of instruction.

## 2020-08-17 NOTE — PERIOP NOTES
Lab results within anesthesia guidelines. Recent Results (from the past 12 hour(s))   CBC WITH AUTOMATED DIFF    Collection Time: 08/17/20  1:20 PM   Result Value Ref Range    WBC 8.0 4.3 - 11.1 K/uL    RBC 5.14 4.23 - 5.6 M/uL    HGB 17.2 13.6 - 17.2 g/dL    HCT 50.2 41.1 - 50.3 %    MCV 97.7 79.6 - 97.8 FL    MCH 33.5 (H) 26.1 - 32.9 PG    MCHC 34.3 31.4 - 35.0 g/dL    RDW 12.6 11.9 - 14.6 %    PLATELET 425 498 - 214 K/uL    MPV 10.2 9.4 - 12.3 FL    ABSOLUTE NRBC 0.00 0.0 - 0.2 K/uL    DF AUTOMATED      NEUTROPHILS 72 43 - 78 %    LYMPHOCYTES 15 13 - 44 %    MONOCYTES 10 4.0 - 12.0 %    EOSINOPHILS 2 0.5 - 7.8 %    BASOPHILS 1 0.0 - 2.0 %    IMMATURE GRANULOCYTES 0 0.0 - 5.0 %    ABS. NEUTROPHILS 5.7 1.7 - 8.2 K/UL    ABS. LYMPHOCYTES 1.2 0.5 - 4.6 K/UL    ABS. MONOCYTES 0.8 0.1 - 1.3 K/UL    ABS. EOSINOPHILS 0.2 0.0 - 0.8 K/UL    ABS. BASOPHILS 0.1 0.0 - 0.2 K/UL    ABS. IMM.  GRANS. 0.0 0.0 - 0.5 K/UL   HEMOGLOBIN A1C WITH EAG    Collection Time: 08/17/20  1:20 PM   Result Value Ref Range    Hemoglobin A1c 5.7 %    Est. average glucose 953 mg/dL   METABOLIC PANEL, BASIC    Collection Time: 08/17/20  1:20 PM   Result Value Ref Range    Sodium 137 136 - 145 mmol/L    Potassium 4.1 3.5 - 5.1 mmol/L    Chloride 104 98 - 107 mmol/L    CO2 27 21 - 32 mmol/L    Anion gap 6 (L) 7 - 16 mmol/L    Glucose 88 65 - 100 mg/dL    BUN 12 8 - 23 MG/DL    Creatinine 0.71 (L) 0.8 - 1.5 MG/DL    GFR est AA >60 >60 ml/min/1.73m2    GFR est non-AA >60 >60 ml/min/1.73m2    Calcium 9.9 8.3 - 10.4 MG/DL   PROTHROMBIN TIME + INR    Collection Time: 08/17/20  1:20 PM   Result Value Ref Range    Prothrombin time 13.4 12.0 - 14.7 sec    INR 1.0     PTT    Collection Time: 08/17/20  1:20 PM   Result Value Ref Range    aPTT 26.3 24.3 - 35.4 SEC

## 2020-08-17 NOTE — PERIOP NOTES
PLEASE CONTINUE TAKING ALL PRESCRIPTION MEDICATIONS UP TO THE DAY OF SURGERY UNLESS OTHERWISE DIRECTED BELOW. DISCONTINUE all vitamins and supplements 21 days prior to surgery. DISCONTINUE Non-Steriodal Anti-Inflammatory (NSAIDS) such as Advil and Aleve 5 days prior to surgery. Home Medications to take  the day of surgery   none           Home Medications   to Hold           Comments    Covid test 08/24/2020 @ 11:20am  [address]   Please bring bottle of soap (Dynahex) and incentive spirometer to the hospital on the day of surgery. *Visitor policy of 1 visitor per patient discussed. Please do not bring home medications with you on the day of surgery unless otherwise directed by your nurse. If you are instructed to bring home medications, please give them to your nurse as they will be administered by the nursing staff. If you have any questions, please call [facility] Lyndsay Weiss [phone] or [facility] [phone]. A copy of this note was provided to the patient for reference.

## 2020-08-17 NOTE — PROGRESS NOTES
Joint Camp Case Management note:  Patient screened in Prehab for discharge planning needs. Patient scheduled for a future total joint replacement. We discussed Home Health and equipment needed after surgery. List of Home Health providers offered. Patient w/o preference towards provider. Pt was referred to [facility] by Surgeon so pt has accepted this offer.  Patient states his MD has mentioned the possibility that he may go home same day as surgery (anterior approach)

## 2020-08-18 VITALS
TEMPERATURE: 98.6 F | OXYGEN SATURATION: 100 % | HEART RATE: 74 BPM | BODY MASS INDEX: 25.12 KG/M2 | SYSTOLIC BLOOD PRESSURE: 156 MMHG | RESPIRATION RATE: 18 BRPM | HEIGHT: 69 IN | DIASTOLIC BLOOD PRESSURE: 69 MMHG | WEIGHT: 169.6 LBS

## 2020-08-18 NOTE — PROGRESS NOTES
20 1200   Oxygen Therapy   O2 Sat (%) 98 %   Pulse via Oximetry 79 beats per minute   O2 Device Room air   Pre-Treatment   Breath Sounds Bilateral Clear   Pt's symptoms include:    Snoring  Tiredness- excessive daytime sleepiness  HTN  Neck size     39.75  Modified Osborne stage 2  SACS Score 13  STOP BANG 5  [place] Sleepiness scale 11  Height   5   '  9  \"   Weight  169   lbs  BMI 25.05      Sleepiness Scale:     Sitting and reading 2    Watching TV 2    Sitting inactive in a public place 0    As a passenger in a car for an hour without a break 3    Lying down to rest in the afternoon when circumstances Permits 3    Sitting and talking to someone 0    Sitting quietly after lunch without alcohol 1    In a car, while stopped for a few minutes 0    Total :  11    Refer patient for sleep study based on above assessment. Initial respiratory Assessment completed with pt. Pt was interviewed and evaluated in Joint camp prior to surgery. Patient ID:  Salo Abrams  [id number]  78 y.o.  1950  Surgeon: Dr. Mayte Morataya  Date of Surgery: 9/3/2020  Procedure: Total Right Hip Arthroplasty  Primary Care Physician: Mishel Meyer [phone]  Specialists:     Pt. IS SOMEWHAT PRACTICING SOCIAL DISTANCING AND WEARING A MASK WHEN IN PUBLIC. Pt instructed in the use of Incentive Spirometry. Pt instructed to bring Incentive Spirometer back on date of surgery & to start using Is upon return to pt room. Written instructions given to patient.   Pt taught proper cough technique    History of smokin PPD FOR 40 YEARS                 Quit date: 2018        Secondhand smoke:DENIES    Past procedures with Oxygen desaturation or delayed awakening:DENIES    Past Medical History:   Diagnosis Date    Actinic keratosis     Adverse effect of anesthesia     liver enzymes increased for 1 week following general anesthesia /rotator cuff repair -    CVA (cere[facility] vascular accident) ([facility] [address] 10/23/18 numbness remains in R hand    Erectile dysfunction     Former smoker     Quit 2/2018--- 1 ppd x 40 yrs    Hypertension     managed with med    Osteoarthritis     feet--- surg for correction    Pain in joint involving pelvic region and thigh     Psychiatric disorder     ANIEXTY    Rosacea         Respiratory history:DENIES SOB                                    Respiratory meds:  DENIES    FAMILY PRESENT:            WIFE                                                PAST SLEEP STUDY:                           DENIES  HX OF TOOTIE:                                        DENIES  TOOTIE assessment:                                               SLEEPS ON SIDE                                                    PHYSICAL EXAM   Body mass index is 25.05 kg/m².    Visit Vitals  /69 (BP 1 Location: Left arm, BP Patient Position: At rest;Sitting)   Pulse 74   Temp 98.6 °F (37 °C)   Resp 18   Ht 5' 9\" (1.753 m)   Wt 76.9 kg (169 lb 9.6 oz)   SpO2 100%   BMI 25.05 kg/m²     Neck circumference:    39.75  cm    Loud snoring:                                                 YES           Witnessed apnea or wakening gasping or choking:        DENIES        Awakens with headaches:                                               DENIES  Morning or daytime tiredness/ sleepiness:                          TIRED  Dry mouth or sore throat in morning:            YES                                               Osborne stage:  2                                   SACS score:13  Stop Bang   STOP-BANG  Does the patient snore loudly (louder than talking or loud enough to be heard through closed doors)?: Yes  Does the patient often feel tired, fatigued, or sleepy during the daytime, even after a \"good\" night's sleep?: Yes  Has anyone ever observed the patient stop breathing during their sleep? : No  Does the patient have or are they being treated for high blood pressure?: Yes  Is the patient's BMI greater than 35?: No  Is your neck circumference greater than 17 inches (Male) or 16 inches (Female)?: No  Is the patient older than 48?: Yes  Is the patient male?: Yes  TOOTIE Score: 5  Has the patient been referred to Sleep Medicine?: Yes  Has the patient previously been diagnosed with Obstructive Sleep Apnea?: No                            [facility]                                          Referrals:  [facility]  Pt.  Phone Number:    [phone]

## 2020-08-20 PROBLEM — R06.83 SNORING: Status: ACTIVE | Noted: 2020-08-20

## 2020-08-20 NOTE — ADVANCED PRACTICE NURSE
Total Joint Surgery Preoperative Chart Review      Patient ID:  Juliette Melton  [id number]  19 y.o.  1950  Surgeon: Dr. Sedrick Salgado  Date of Surgery: 9/3/2020  Procedure: Total Left Hip Arthroplasty  Primary Care Physician: Angelique Powell [phone]  Specialty Physician(s):      Subjective:   Juliette Melton is a 78 y.o. WHITE OR  male who presents for preoperative evaluation for Total Left Hip arthroplasty. This is a preoperative chart review note based on data collected by the nurse at the surgical Pre-Assessment visit. Past Medical History:   Diagnosis Date    Actinic keratosis     Adverse effect of anesthesia     liver enzymes increased for 1 week following general anesthesia /rotator cuff repair -    CVA (cerebral vascular accident) ([address]     10/23/18 numbness remains in R hand    Erectile dysfunction     Former smoker     Quit 2018--- 1 ppd x 40 yrs    Hypertension     managed with med    Osteoarthritis     feet--- surg for correction    Pain in joint involving pelvic region and thigh     Psychiatric disorder     ANIEXTY    Rosacea       Past Surgical History:   Procedure Laterality Date    HX COLONOSCOPY      HX ORTHOPAEDIC Right 2018    RIGHT FOOT 1ST MTP CHEILECTOMY     HX ORTHOPAEDIC Left 2019    elbow surg    HX ROTATOR CUFF REPAIR Left     HX SHOULDER ARTHROSCOPY Right     bicep     Family History   Problem Relation Age of Onset    Heart Disease Mother     Heart Disease Father     Heart Attack Brother         before age 61    Hypertension Brother     Heart Disease Brother       Social History     Tobacco Use    Smoking status: Former Smoker     Packs/day: 1.00     Years: 40.00     Pack years: 40.00     Last attempt to quit: 2018     Years since quittin.4    Smokeless tobacco: Never Used   Substance Use Topics    Alcohol use:  Yes     Alcohol/week: 2.0 standard drinks     Types: 2 Glasses of wine per week Prior to Admission medications    Medication Sig Start Date End Date Taking? Authorizing Provider   ergocalciferol (ERGOCALCIFEROL) 1,250 mcg (50,000 unit) capsule Day 1 and 15 of each month 6/26/20  Yes Susan Gambino MD   losartan (COZAAR) 50 mg tablet Take 1 Tab by mouth nightly. Indications: high blood pressure 4/1/20  Yes Bill Rodrigues MD   tadalafil (CIALIS) 20 mg tablet TAKE 1 TABLET AS NEEDED FOR ERECTILE DYSFUNCTION 3/2/20  Yes Shira Bender MD   aspirin delayed-release 81 mg tablet Take 81 mg by mouth nightly. Yes Provider, Historical   melatonin 3 mg tablet Take 3 mg by mouth nightly. Yes Provider, Historical   ALPRAZolam (XANAX) 2 mg tablet Take 0.5-1 Tabs by mouth two (2) times daily as needed for Anxiety. Max Daily Amount: 4 mg. Indications: anxiety  Patient taking differently: Take 2 mg by mouth as needed for Anxiety. Indications: anxious 8/6/18   Bill Rodrigues MD     No Known Allergies       Objective:     Physical Exam:   No data found. ECG:    EKG Results     None          Data Review:   Labs:     Labs reviewed      Problem List:  )  Patient Active Problem List   Diagnosis Code    Anxiety F41.9    Erectile dysfunction N52.9    Colon polyp K63.5    Insomnia G47.00    Hypercholesteremia E78.00    Hypertension I10    History of stroke with residual deficit I69.30    Encounter for long-term current use of medication Z79.899    Primary hyperparathyroidism ([facility] [address] E21.0    Vitamin D deficiency E55.9    Osteopenia M85.80    Hyponatremia E87.1    Osteoarthritis M19.90    Snoring R06.83       Total Joint Surgery Pre-Assessment Recommendations:           Patient reports the symptoms of snoring, fatigue, observed apnea and /or excessive daytime sleepiness. Will refer patient for HST based on above assessment. Recommend continuous saturation monitoring hours of sleep, during hospitalization.         Signed By: KESHA Velasco    August 20, 2020

## 2020-08-28 NOTE — H&P
[place] [address] [place] [address]  History and Physical Exam    Patient ID:  Devendra Ritchie  [id number]    72 y.o.  1950    Today: August 28, 2020    Vitals Signs: Reviewed as noted in medical record. Allergies: No Known Allergies    CC: Right hip pain    HPI:  Pt complains of right hip pain and difficulty ambulating. Relevant PMH:   Past Medical History:   Diagnosis Date    Actinic keratosis     Adverse effect of anesthesia     liver enzymes increased for 1 week following general anesthesia /rotator cuff repair 2011-    CVA (cerebral vascular accident) ([address]     10/23/18 numbness remains in R hand    Erectile dysfunction     Former smoker     Quit 2/2018--- 1 ppd x 40 yrs    Hypertension     managed with med    Osteoarthritis     feet--- surg for correction    Pain in joint involving pelvic region and thigh     Psychiatric disorder     ANIEXTY    Rosacea        Objective:                    HEENT: NC/AT                   Lungs:  clear                   Heart:   rrr                   Abdomen: soft                   Extremities:  Pain with rom of the right hip joint    Radiographs: reveal osteoarthritis with loss of joint space and bone spurs. Assessment: Unilateral primary osteoarthritis, right hip [M16.11]    Plan:  Proceed with scheduled Procedure(s) (LRB):  RIGHT HIP ARTHROPLASTY TOTAL DIRECT ANTERIOR APPROACH/ DEPUY (Right) . The patient has failed conservative treatment including NSAIDS, and injections. Due to the amount of pain the patient is experiencing we will proceed with scheduled procedure. It is also felt that the patient is high risk for postoperative complication due to history of multiple chronic medical problems.   The patient may potentially spend 2 nights in the hospital.      Signed By: VERENA Christian  August 28, 2020

## 2020-09-02 ENCOUNTER — ANESTHESIA EVENT (OUTPATIENT)
Dept: SURGERY | Age: 70
DRG: 470 | End: 2020-09-02
Payer: MEDICARE

## 2020-09-03 ENCOUNTER — APPOINTMENT (OUTPATIENT)
Dept: GENERAL RADIOLOGY | Age: 70
DRG: 470 | End: 2020-09-03
Attending: PHYSICIAN ASSISTANT
Payer: MEDICARE

## 2020-09-03 ENCOUNTER — ANESTHESIA (OUTPATIENT)
Dept: SURGERY | Age: 70
DRG: 470 | End: 2020-09-03
Payer: MEDICARE

## 2020-09-03 ENCOUNTER — APPOINTMENT (OUTPATIENT)
Dept: GENERAL RADIOLOGY | Age: 70
DRG: 470 | End: 2020-09-03
Attending: ORTHOPAEDIC SURGERY
Payer: MEDICARE

## 2020-09-03 ENCOUNTER — HOSPITAL ENCOUNTER (INPATIENT)
Age: 70
LOS: 1 days | Discharge: HOME HEALTH | DRG: 470 | End: 2020-09-04
Attending: ORTHOPAEDIC SURGERY | Admitting: ORTHOPAEDIC SURGERY
Payer: MEDICARE

## 2020-09-03 DIAGNOSIS — Z96.641 STATUS POST TOTAL HIP REPLACEMENT, RIGHT: Primary | ICD-10-CM

## 2020-09-03 PROBLEM — M16.11 ARTHRITIS OF RIGHT HIP: Status: ACTIVE | Noted: 2020-09-03

## 2020-09-03 LAB
GLUCOSE BLD STRIP.AUTO-MCNC: 91 MG/DL (ref 65–100)
HGB BLD-MCNC: 14.9 G/DL (ref 13.6–17.2)

## 2020-09-03 PROCEDURE — C1776 JOINT DEVICE (IMPLANTABLE): HCPCS | Performed by: ORTHOPAEDIC SURGERY

## 2020-09-03 PROCEDURE — 77030019557 HC ELECTRD VES SEAL MEDT -F: Performed by: ORTHOPAEDIC SURGERY

## 2020-09-03 PROCEDURE — 97161 PT EVAL LOW COMPLEX 20 MIN: CPT

## 2020-09-03 PROCEDURE — 76210000006 HC OR PH I REC 0.5 TO 1 HR: Performed by: ORTHOPAEDIC SURGERY

## 2020-09-03 PROCEDURE — 73501 X-RAY EXAM HIP UNI 1 VIEW: CPT

## 2020-09-03 PROCEDURE — 85018 HEMOGLOBIN: CPT

## 2020-09-03 PROCEDURE — 77030031139 HC SUT VCRL2 J&J -A: Performed by: ORTHOPAEDIC SURGERY

## 2020-09-03 PROCEDURE — 74011000250 HC RX REV CODE- 250: Performed by: ANESTHESIOLOGY

## 2020-09-03 PROCEDURE — 77030034479 HC ADH SKN CLSR PRINEO J&J -B: Performed by: ORTHOPAEDIC SURGERY

## 2020-09-03 PROCEDURE — 76060000035 HC ANESTHESIA 2 TO 2.5 HR: Performed by: ORTHOPAEDIC SURGERY

## 2020-09-03 PROCEDURE — 77030013708 HC HNDPC SUC IRR PULS STRY –B: Performed by: ORTHOPAEDIC SURGERY

## 2020-09-03 PROCEDURE — 77030040922 HC BLNKT HYPOTHRM STRY -A: Performed by: ANESTHESIOLOGY

## 2020-09-03 PROCEDURE — 65270000029 HC RM PRIVATE

## 2020-09-03 PROCEDURE — 0SR904A REPLACEMENT OF RIGHT HIP JOINT WITH CERAMIC ON POLYETHYLENE SYNTHETIC SUBSTITUTE, UNCEMENTED, OPEN APPROACH: ICD-10-PCS | Performed by: ORTHOPAEDIC SURGERY

## 2020-09-03 PROCEDURE — 74011250636 HC RX REV CODE- 250/636: Performed by: NURSE ANESTHETIST, CERTIFIED REGISTERED

## 2020-09-03 PROCEDURE — 74011250637 HC RX REV CODE- 250/637: Performed by: ANESTHESIOLOGY

## 2020-09-03 PROCEDURE — 77030037088 HC TUBE ENDOTRACH ORAL NSL COVD-A: Performed by: ANESTHESIOLOGY

## 2020-09-03 PROCEDURE — 97110 THERAPEUTIC EXERCISES: CPT

## 2020-09-03 PROCEDURE — 74011000250 HC RX REV CODE- 250: Performed by: NURSE ANESTHETIST, CERTIFIED REGISTERED

## 2020-09-03 PROCEDURE — 74011000250 HC RX REV CODE- 250: Performed by: PHYSICIAN ASSISTANT

## 2020-09-03 PROCEDURE — 97165 OT EVAL LOW COMPLEX 30 MIN: CPT

## 2020-09-03 PROCEDURE — 97535 SELF CARE MNGMENT TRAINING: CPT

## 2020-09-03 PROCEDURE — 74011250636 HC RX REV CODE- 250/636: Performed by: ANESTHESIOLOGY

## 2020-09-03 PROCEDURE — 72170 X-RAY EXAM OF PELVIS: CPT

## 2020-09-03 PROCEDURE — 82962 GLUCOSE BLOOD TEST: CPT

## 2020-09-03 PROCEDURE — 94760 N-INVAS EAR/PLS OXIMETRY 1: CPT

## 2020-09-03 PROCEDURE — 77030002933 HC SUT MCRYL J&J -A: Performed by: ORTHOPAEDIC SURGERY

## 2020-09-03 PROCEDURE — 77030018547 HC SUT ETHBND1 J&J -B: Performed by: ORTHOPAEDIC SURGERY

## 2020-09-03 PROCEDURE — 77030007880 HC KT SPN EPDRL BBMI -B: Performed by: ANESTHESIOLOGY

## 2020-09-03 PROCEDURE — 77030006835 HC BLD SAW SAG STRY -B: Performed by: ORTHOPAEDIC SURGERY

## 2020-09-03 PROCEDURE — 36415 COLL VENOUS BLD VENIPUNCTURE: CPT

## 2020-09-03 PROCEDURE — 74011250636 HC RX REV CODE- 250/636: Performed by: PHYSICIAN ASSISTANT

## 2020-09-03 PROCEDURE — 74011250636 HC RX REV CODE- 250/636: Performed by: ORTHOPAEDIC SURGERY

## 2020-09-03 PROCEDURE — 77030003665 HC NDL SPN BBMI -A: Performed by: ANESTHESIOLOGY

## 2020-09-03 PROCEDURE — 77030039425 HC BLD LARYNG TRULITE DISP TELE -A: Performed by: ANESTHESIOLOGY

## 2020-09-03 PROCEDURE — 74011250637 HC RX REV CODE- 250/637: Performed by: PHYSICIAN ASSISTANT

## 2020-09-03 PROCEDURE — 77030019905 HC CATH URETH INTMIT MDII -A

## 2020-09-03 PROCEDURE — 76010000171 HC OR TIME 2 TO 2.5 HR INTENSV-TIER 1: Performed by: ORTHOPAEDIC SURGERY

## 2020-09-03 PROCEDURE — 94762 N-INVAS EAR/PLS OXIMTRY CONT: CPT

## 2020-09-03 DEVICE — STEM FEM SZ 7 HIP HI OFFSET CLLRD CEMENTLESS 12/14 TAPR: Type: IMPLANTABLE DEVICE | Site: HIP | Status: FUNCTIONAL

## 2020-09-03 DEVICE — HIP H2 TOT ADV OTHER HD IMPL CAPPED SYNTHES: Type: IMPLANTABLE DEVICE | Status: FUNCTIONAL

## 2020-09-03 DEVICE — HEAD FEM DIA36MM +5MM OFFSET 12/14 TAPR HIP CERAMIC BIOLOX: Type: IMPLANTABLE DEVICE | Site: HIP | Status: FUNCTIONAL

## 2020-09-03 DEVICE — SCREW BNE L25MM DIA6.5MM CANC HIP S STL GRIPTION FULL THRD: Type: IMPLANTABLE DEVICE | Site: HIP | Status: FUNCTIONAL

## 2020-09-03 DEVICE — LINER ACET OD54MM ID36MM HIP ALTRX PINN: Type: IMPLANTABLE DEVICE | Site: HIP | Status: FUNCTIONAL

## 2020-09-03 DEVICE — CUP ACET DIA54MM 12 H HIP TI GRIPTION VIP TAPR DOME REV: Type: IMPLANTABLE DEVICE | Site: HIP | Status: FUNCTIONAL

## 2020-09-03 RX ORDER — GLYCOPYRROLATE 0.2 MG/ML
INJECTION INTRAMUSCULAR; INTRAVENOUS AS NEEDED
Status: DISCONTINUED | OUTPATIENT
Start: 2020-09-03 | End: 2020-09-03 | Stop reason: HOSPADM

## 2020-09-03 RX ORDER — PROPOFOL 10 MG/ML
INJECTION, EMULSION INTRAVENOUS
Status: DISCONTINUED | OUTPATIENT
Start: 2020-09-03 | End: 2020-09-03 | Stop reason: HOSPADM

## 2020-09-03 RX ORDER — DEXAMETHASONE SODIUM PHOSPHATE 100 MG/10ML
10 INJECTION INTRAMUSCULAR; INTRAVENOUS ONCE
Status: DISCONTINUED | OUTPATIENT
Start: 2020-09-04 | End: 2020-09-04 | Stop reason: HOSPADM

## 2020-09-03 RX ORDER — OXYCODONE HYDROCHLORIDE 5 MG/1
5 TABLET ORAL
Status: DISCONTINUED | OUTPATIENT
Start: 2020-09-03 | End: 2020-09-03 | Stop reason: HOSPADM

## 2020-09-03 RX ORDER — OXYCODONE HYDROCHLORIDE 5 MG/1
10 TABLET ORAL
Status: DISCONTINUED | OUTPATIENT
Start: 2020-09-03 | End: 2020-09-04 | Stop reason: HOSPADM

## 2020-09-03 RX ORDER — ALPRAZOLAM 0.5 MG/1
2 TABLET ORAL AS NEEDED
Status: DISCONTINUED | OUTPATIENT
Start: 2020-09-03 | End: 2020-09-03

## 2020-09-03 RX ORDER — FENTANYL CITRATE 50 UG/ML
100 INJECTION, SOLUTION INTRAMUSCULAR; INTRAVENOUS ONCE
Status: DISCONTINUED | OUTPATIENT
Start: 2020-09-03 | End: 2020-09-03 | Stop reason: HOSPADM

## 2020-09-03 RX ORDER — NEOSTIGMINE METHYLSULFATE 1 MG/ML
INJECTION, SOLUTION INTRAVENOUS AS NEEDED
Status: DISCONTINUED | OUTPATIENT
Start: 2020-09-03 | End: 2020-09-03 | Stop reason: HOSPADM

## 2020-09-03 RX ORDER — MIDAZOLAM HYDROCHLORIDE 1 MG/ML
INJECTION, SOLUTION INTRAMUSCULAR; INTRAVENOUS AS NEEDED
Status: DISCONTINUED | OUTPATIENT
Start: 2020-09-03 | End: 2020-09-03 | Stop reason: HOSPADM

## 2020-09-03 RX ORDER — DEXAMETHASONE SODIUM PHOSPHATE 4 MG/ML
INJECTION, SOLUTION INTRA-ARTICULAR; INTRALESIONAL; INTRAMUSCULAR; INTRAVENOUS; SOFT TISSUE AS NEEDED
Status: DISCONTINUED | OUTPATIENT
Start: 2020-09-03 | End: 2020-09-03 | Stop reason: HOSPADM

## 2020-09-03 RX ORDER — DIPHENHYDRAMINE HCL 25 MG
25 CAPSULE ORAL
Status: DISCONTINUED | OUTPATIENT
Start: 2020-09-03 | End: 2020-09-04 | Stop reason: HOSPADM

## 2020-09-03 RX ORDER — ROPIVACAINE HYDROCHLORIDE 2 MG/ML
INJECTION, SOLUTION EPIDURAL; INFILTRATION; PERINEURAL AS NEEDED
Status: DISCONTINUED | OUTPATIENT
Start: 2020-09-03 | End: 2020-09-03 | Stop reason: HOSPADM

## 2020-09-03 RX ORDER — AMOXICILLIN 250 MG
2 CAPSULE ORAL DAILY
Status: DISCONTINUED | OUTPATIENT
Start: 2020-09-04 | End: 2020-09-04 | Stop reason: HOSPADM

## 2020-09-03 RX ORDER — ALBUTEROL SULFATE 0.83 MG/ML
2.5 SOLUTION RESPIRATORY (INHALATION) AS NEEDED
Status: DISCONTINUED | OUTPATIENT
Start: 2020-09-03 | End: 2020-09-03 | Stop reason: HOSPADM

## 2020-09-03 RX ORDER — CEFAZOLIN SODIUM/WATER 2 G/20 ML
2 SYRINGE (ML) INTRAVENOUS ONCE
Status: COMPLETED | OUTPATIENT
Start: 2020-09-03 | End: 2020-09-03

## 2020-09-03 RX ORDER — LIDOCAINE HYDROCHLORIDE 10 MG/ML
0.1 INJECTION INFILTRATION; PERINEURAL AS NEEDED
Status: DISCONTINUED | OUTPATIENT
Start: 2020-09-03 | End: 2020-09-03 | Stop reason: HOSPADM

## 2020-09-03 RX ORDER — LOSARTAN POTASSIUM 50 MG/1
50 TABLET ORAL
Status: DISCONTINUED | OUTPATIENT
Start: 2020-09-03 | End: 2020-09-04 | Stop reason: HOSPADM

## 2020-09-03 RX ORDER — FENTANYL CITRATE 50 UG/ML
INJECTION, SOLUTION INTRAMUSCULAR; INTRAVENOUS AS NEEDED
Status: DISCONTINUED | OUTPATIENT
Start: 2020-09-03 | End: 2020-09-03 | Stop reason: HOSPADM

## 2020-09-03 RX ORDER — ROCURONIUM BROMIDE 10 MG/ML
INJECTION, SOLUTION INTRAVENOUS AS NEEDED
Status: DISCONTINUED | OUTPATIENT
Start: 2020-09-03 | End: 2020-09-03 | Stop reason: HOSPADM

## 2020-09-03 RX ORDER — TRANEXAMIC ACID 100 MG/ML
INJECTION, SOLUTION INTRAVENOUS AS NEEDED
Status: DISCONTINUED | OUTPATIENT
Start: 2020-09-03 | End: 2020-09-03 | Stop reason: HOSPADM

## 2020-09-03 RX ORDER — SODIUM CHLORIDE 0.9 % (FLUSH) 0.9 %
5-40 SYRINGE (ML) INJECTION EVERY 8 HOURS
Status: CANCELLED | OUTPATIENT
Start: 2020-09-03

## 2020-09-03 RX ORDER — ASPIRIN 81 MG/1
81 TABLET ORAL EVERY 12 HOURS
Qty: 60 TAB | Refills: 0 | Status: SHIPPED | OUTPATIENT
Start: 2020-09-03 | End: 2020-12-10

## 2020-09-03 RX ORDER — CEFAZOLIN SODIUM/WATER 2 G/20 ML
2 SYRINGE (ML) INTRAVENOUS EVERY 8 HOURS
Status: COMPLETED | OUTPATIENT
Start: 2020-09-03 | End: 2020-09-04

## 2020-09-03 RX ORDER — ONDANSETRON 2 MG/ML
INJECTION INTRAMUSCULAR; INTRAVENOUS AS NEEDED
Status: DISCONTINUED | OUTPATIENT
Start: 2020-09-03 | End: 2020-09-03 | Stop reason: HOSPADM

## 2020-09-03 RX ORDER — CELECOXIB 200 MG/1
200 CAPSULE ORAL AS NEEDED
Status: COMPLETED | OUTPATIENT
Start: 2020-09-03 | End: 2020-09-03

## 2020-09-03 RX ORDER — VANCOMYCIN HYDROCHLORIDE 1 G/20ML
INJECTION, POWDER, LYOPHILIZED, FOR SOLUTION INTRAVENOUS AS NEEDED
Status: DISCONTINUED | OUTPATIENT
Start: 2020-09-03 | End: 2020-09-03 | Stop reason: HOSPADM

## 2020-09-03 RX ORDER — CELECOXIB 200 MG/1
200 CAPSULE ORAL EVERY 12 HOURS
Status: DISCONTINUED | OUTPATIENT
Start: 2020-09-03 | End: 2020-09-04 | Stop reason: HOSPADM

## 2020-09-03 RX ORDER — MIDAZOLAM HYDROCHLORIDE 1 MG/ML
2 INJECTION, SOLUTION INTRAMUSCULAR; INTRAVENOUS
Status: DISCONTINUED | OUTPATIENT
Start: 2020-09-03 | End: 2020-09-03 | Stop reason: HOSPADM

## 2020-09-03 RX ORDER — HYDROMORPHONE HYDROCHLORIDE 2 MG/ML
0.5 INJECTION, SOLUTION INTRAMUSCULAR; INTRAVENOUS; SUBCUTANEOUS
Status: DISCONTINUED | OUTPATIENT
Start: 2020-09-03 | End: 2020-09-03 | Stop reason: HOSPADM

## 2020-09-03 RX ORDER — ONDANSETRON 4 MG/1
4 TABLET, ORALLY DISINTEGRATING ORAL
Status: DISCONTINUED | OUTPATIENT
Start: 2020-09-03 | End: 2020-09-04 | Stop reason: HOSPADM

## 2020-09-03 RX ORDER — SODIUM CHLORIDE, SODIUM LACTATE, POTASSIUM CHLORIDE, CALCIUM CHLORIDE 600; 310; 30; 20 MG/100ML; MG/100ML; MG/100ML; MG/100ML
75 INJECTION, SOLUTION INTRAVENOUS CONTINUOUS
Status: DISCONTINUED | OUTPATIENT
Start: 2020-09-03 | End: 2020-09-03 | Stop reason: HOSPADM

## 2020-09-03 RX ORDER — ACETAMINOPHEN 325 MG/1
975 TABLET ORAL ONCE
Status: DISCONTINUED | OUTPATIENT
Start: 2020-09-03 | End: 2020-09-03 | Stop reason: SDUPTHER

## 2020-09-03 RX ORDER — SODIUM CHLORIDE, SODIUM LACTATE, POTASSIUM CHLORIDE, CALCIUM CHLORIDE 600; 310; 30; 20 MG/100ML; MG/100ML; MG/100ML; MG/100ML
50 INJECTION, SOLUTION INTRAVENOUS CONTINUOUS
Status: DISCONTINUED | OUTPATIENT
Start: 2020-09-03 | End: 2020-09-03 | Stop reason: HOSPADM

## 2020-09-03 RX ORDER — NALOXONE HYDROCHLORIDE 0.4 MG/ML
.2-.4 INJECTION, SOLUTION INTRAMUSCULAR; INTRAVENOUS; SUBCUTANEOUS
Status: DISCONTINUED | OUTPATIENT
Start: 2020-09-03 | End: 2020-09-04 | Stop reason: HOSPADM

## 2020-09-03 RX ORDER — HYDROMORPHONE HYDROCHLORIDE 1 MG/ML
1 INJECTION, SOLUTION INTRAMUSCULAR; INTRAVENOUS; SUBCUTANEOUS
Status: DISCONTINUED | OUTPATIENT
Start: 2020-09-03 | End: 2020-09-04 | Stop reason: HOSPADM

## 2020-09-03 RX ORDER — SODIUM CHLORIDE 0.9 % (FLUSH) 0.9 %
5-40 SYRINGE (ML) INJECTION AS NEEDED
Status: CANCELLED | OUTPATIENT
Start: 2020-09-03

## 2020-09-03 RX ORDER — KETOROLAC TROMETHAMINE 30 MG/ML
INJECTION, SOLUTION INTRAMUSCULAR; INTRAVENOUS AS NEEDED
Status: DISCONTINUED | OUTPATIENT
Start: 2020-09-03 | End: 2020-09-03 | Stop reason: HOSPADM

## 2020-09-03 RX ORDER — EPHEDRINE SULFATE/0.9% NACL/PF 50 MG/5 ML
SYRINGE (ML) INTRAVENOUS AS NEEDED
Status: DISCONTINUED | OUTPATIENT
Start: 2020-09-03 | End: 2020-09-03 | Stop reason: HOSPADM

## 2020-09-03 RX ORDER — ACETAMINOPHEN 650 MG/1
650 SUPPOSITORY RECTAL ONCE
Status: DISCONTINUED | OUTPATIENT
Start: 2020-09-03 | End: 2020-09-03 | Stop reason: SDUPTHER

## 2020-09-03 RX ORDER — BUPIVACAINE HYDROCHLORIDE 7.5 MG/ML
INJECTION, SOLUTION INTRASPINAL
Status: COMPLETED | OUTPATIENT
Start: 2020-09-03 | End: 2020-09-03

## 2020-09-03 RX ORDER — SODIUM CHLORIDE 9 MG/ML
100 INJECTION, SOLUTION INTRAVENOUS CONTINUOUS
Status: DISCONTINUED | OUTPATIENT
Start: 2020-09-03 | End: 2020-09-04 | Stop reason: HOSPADM

## 2020-09-03 RX ORDER — ACETAMINOPHEN 500 MG
1000 TABLET ORAL ONCE
Status: COMPLETED | OUTPATIENT
Start: 2020-09-03 | End: 2020-09-03

## 2020-09-03 RX ORDER — OXYCODONE HYDROCHLORIDE 10 MG/1
10 TABLET ORAL
Qty: 50 TAB | Refills: 0 | Status: SHIPPED
Start: 2020-09-03 | End: 2020-09-06

## 2020-09-03 RX ORDER — CEFAZOLIN SODIUM/WATER 2 G/20 ML
2 SYRINGE (ML) INTRAVENOUS ONCE
Status: DISCONTINUED | OUTPATIENT
Start: 2020-09-03 | End: 2020-09-03 | Stop reason: SDUPTHER

## 2020-09-03 RX ORDER — MIDAZOLAM HYDROCHLORIDE 1 MG/ML
2 INJECTION, SOLUTION INTRAMUSCULAR; INTRAVENOUS ONCE
Status: COMPLETED | OUTPATIENT
Start: 2020-09-03 | End: 2020-09-03

## 2020-09-03 RX ORDER — ACETAMINOPHEN 500 MG
1000 TABLET ORAL EVERY 6 HOURS
Status: DISCONTINUED | OUTPATIENT
Start: 2020-09-03 | End: 2020-09-04 | Stop reason: HOSPADM

## 2020-09-03 RX ORDER — PROPOFOL 10 MG/ML
INJECTION, EMULSION INTRAVENOUS AS NEEDED
Status: DISCONTINUED | OUTPATIENT
Start: 2020-09-03 | End: 2020-09-03 | Stop reason: HOSPADM

## 2020-09-03 RX ORDER — ASPIRIN 81 MG/1
81 TABLET ORAL EVERY 12 HOURS
Status: DISCONTINUED | OUTPATIENT
Start: 2020-09-03 | End: 2020-09-04 | Stop reason: HOSPADM

## 2020-09-03 RX ADMIN — GLYCOPYRROLATE 0.4 MG: 0.2 INJECTION, SOLUTION INTRAMUSCULAR; INTRAVENOUS at 09:41

## 2020-09-03 RX ADMIN — CELECOXIB 200 MG: 200 CAPSULE ORAL at 20:39

## 2020-09-03 RX ADMIN — SODIUM CHLORIDE, SODIUM LACTATE, POTASSIUM CHLORIDE, AND CALCIUM CHLORIDE 75 ML/HR: 600; 310; 30; 20 INJECTION, SOLUTION INTRAVENOUS at 06:54

## 2020-09-03 RX ADMIN — OXYCODONE 10 MG: 5 TABLET ORAL at 11:37

## 2020-09-03 RX ADMIN — BUPIVACAINE HYDROCHLORIDE IN DEXTROSE 15 MG: 7.5 INJECTION, SOLUTION SUBARACHNOID at 08:22

## 2020-09-03 RX ADMIN — Medication 3 AMPULE: at 06:53

## 2020-09-03 RX ADMIN — LOSARTAN POTASSIUM 50 MG: 50 TABLET, FILM COATED ORAL at 22:00

## 2020-09-03 RX ADMIN — OXYCODONE 10 MG: 5 TABLET ORAL at 16:03

## 2020-09-03 RX ADMIN — Medication 3 MG: at 09:41

## 2020-09-03 RX ADMIN — PROPOFOL 50 MCG/KG/MIN: 10 INJECTION, EMULSION INTRAVENOUS at 08:20

## 2020-09-03 RX ADMIN — Medication 10 MG: at 09:36

## 2020-09-03 RX ADMIN — FENTANYL CITRATE 100 MCG: 50 INJECTION INTRAMUSCULAR; INTRAVENOUS at 08:54

## 2020-09-03 RX ADMIN — ACETAMINOPHEN 1000 MG: 500 TABLET, FILM COATED ORAL at 06:53

## 2020-09-03 RX ADMIN — LIDOCAINE HYDROCHLORIDE 0.1 ML: 10 INJECTION, SOLUTION INFILTRATION; PERINEURAL at 06:54

## 2020-09-03 RX ADMIN — Medication 2 G: at 08:10

## 2020-09-03 RX ADMIN — ASPIRIN 81 MG: 81 TABLET, COATED ORAL at 20:40

## 2020-09-03 RX ADMIN — PROPOFOL 150 MG: 10 INJECTION, EMULSION INTRAVENOUS at 08:54

## 2020-09-03 RX ADMIN — TRANEXAMIC ACID 1000 MG: 100 INJECTION, SOLUTION INTRAVENOUS at 08:22

## 2020-09-03 RX ADMIN — DEXAMETHASONE SODIUM PHOSPHATE 10 MG: 4 INJECTION, SOLUTION INTRAMUSCULAR; INTRAVENOUS at 08:20

## 2020-09-03 RX ADMIN — CELECOXIB 200 MG: 200 CAPSULE ORAL at 06:53

## 2020-09-03 RX ADMIN — Medication 1 AMPULE: at 20:39

## 2020-09-03 RX ADMIN — SODIUM CHLORIDE, SODIUM LACTATE, POTASSIUM CHLORIDE, AND CALCIUM CHLORIDE: 600; 310; 30; 20 INJECTION, SOLUTION INTRAVENOUS at 08:48

## 2020-09-03 RX ADMIN — CEFAZOLIN SODIUM 2 G: 100 INJECTION, POWDER, LYOPHILIZED, FOR SOLUTION INTRAVENOUS at 16:08

## 2020-09-03 RX ADMIN — ROCURONIUM BROMIDE 50 MG: 10 INJECTION, SOLUTION INTRAVENOUS at 08:54

## 2020-09-03 RX ADMIN — ONDANSETRON 4 MG: 2 INJECTION INTRAMUSCULAR; INTRAVENOUS at 08:20

## 2020-09-03 RX ADMIN — ACETAMINOPHEN 1000 MG: 500 TABLET, FILM COATED ORAL at 17:57

## 2020-09-03 RX ADMIN — MIDAZOLAM 2 MG: 1 INJECTION INTRAMUSCULAR; INTRAVENOUS at 08:16

## 2020-09-03 RX ADMIN — MIDAZOLAM 2 MG: 1 INJECTION INTRAMUSCULAR; INTRAVENOUS at 08:00

## 2020-09-03 NOTE — ANESTHESIA POSTPROCEDURE EVALUATION
Procedure(s):  RIGHT HIP ARTHROPLASTY TOTAL DIRECT ANTERIOR APPROACH/ DEPUY.     general, total IV anesthesia    Anesthesia Post Evaluation      Multimodal analgesia: multimodal analgesia used between 6 hours prior to anesthesia start to PACU discharge  Patient location during evaluation: PACU  Patient participation: complete - patient participated  Level of consciousness: awake and alert  Pain management: adequate  Airway patency: patent  Anesthetic complications: no  Cardiovascular status: acceptable  Respiratory status: acceptable, spontaneous ventilation and nonlabored ventilation  Hydration status: acceptable  Post anesthesia nausea and vomiting:  none      INITIAL Post-op Vital signs:   Vitals Value Taken Time   /66 9/3/2020 10:12 AM   Temp 36.8 °C (98.2 °F) 9/3/2020 10:12 AM   Pulse 73 9/3/2020 10:12 AM   Resp 14 9/3/2020 10:22 AM   SpO2 99 % 9/3/2020 10:22 AM

## 2020-09-03 NOTE — PROGRESS NOTES
Problem: Self Care Deficits Care Plan (Adult)  Goal: *Acute Goals and Plan of Care (Insert Text)  Description: GOALS:   DISCHARGE GOALS (in preparation for going home/rehab):  3 days  1. Mr. Jass Pierre will perform one lower body dressing activity with minimal assistance with adaptive equipment to demonstrate improved functional mobility and safety. 2.  Mr. Jass Pierre will perform one lower body bathing activity with minimal  assistance with adaptive equipment to demonstrate improved functional mobility and safety. 3.  Mr. Jass Pierre will perform toileting/toilet transfer with contact guard assistance with adaptive equipment to demonstrate improved functional mobility and safety. 4.  Mr. Jass Pierre will perform shower transfer with contact guard assistance with adaptive equipment to demonstrate improved functional mobility and safety. 5.  Mr. Jass Pierre will state KASANDRA precautions with two verbal cues to demonstrate improved functional mobility and safety. Outcome: Progressing Towards Goal       JOINT CAMP OCCUPATIONAL THERAPY KASANDRA: Initial Assessment, Daily Note, Treatment Day: Day of Assessment, and PM 9/3/2020  INPATIENT: Hospital Day: 1  [place] / Product Type: Medicare /      NAME/AGE/GENDER: Tash Stern is a 78 y.o. male   PRIMARY DIAGNOSIS:  Unilateral primary osteoarthritis, right hip [M16.11]   Procedure(s) and Anesthesia Type:     * RIGHT HIP ARTHROPLASTY TOTAL DIRECT ANTERIOR APPROACH/ DEPUY - Spinal (Right)  ICD-10: Treatment Diagnosis:    Pain in Right Hip (M25.551)  Stiffness of Right Hip, Not elsewhere classified (M25.651)      ASSESSMENT:     Mr. Jass Pierre is s/p right KASANDRA anterior and presents with decreased weight bearing on right LE and decreased independence with functional mobility and activities of daily living as compared to prior level of function and safety.   Patient would benefit from skilled Occupational Therapy to maximize independence and safety with self-care task and functional mobility. Pt would also benefit from education on lower body adaptive equipment and hip precautions post-surgery in preparation for going home. Patient plans for further rehab at home with home health services and good family support wife. OT reviewed therapy schedule and plan of care with patient. Patient was able to transfer and perform self care skills as charted below. Patient instructed to call for assistance when needing to get up from the recliner and all needs in reach. Patient verbalized understanding of call light. Pt up in room to edge of bed and donned clothes. Pt noted to have some incoordination due to residual spinal block. Pt moved ok despite this and transferred to recliner. This section established at most recent assessment   PROBLEM LIST (Impairments causing functional limitations):  Decreased Strength  Decreased ADL/Functional Activities  Decreased Transfer Abilities  Increased Pain  Increased Fatigue  Decreased Flexibility/Joint Mobility  Decreased Knowledge of Precautions   INTERVENTIONS PLANNED: (Benefits and precautions of occupational therapy have been discussed with the patient.)  Activities of daily living training  Adaptive equipment training  Balance training  Clothing management  Donning&doffing training  Theraputic activity     TREATMENT PLAN: Frequency/Duration: Follow patient 1-2 times to address above goals. Rehabilitation Potential For Stated Goals: Good     RECOMMENDED REHABILITATION/EQUIPMENT: (at time of discharge pending progress): Continue Skilled Therapy. OCCUPATIONAL PROFILE AND HISTORY:   History of Present Injury/Illness (Reason for Referral): Pt presents this date s/p (right) KASANDRA.     Past Medical History/Comorbidities:   Mr. Radha Vance  has a past medical history of Actinic keratosis, Adverse effect of anesthesia, CVA (cerebral vascular accident) [facility]), Erectile dysfunction, Former smoker, Hypertension, Osteoarthritis, Pain in joint involving pelvic region and thigh, Psychiatric disorder, and Rosacea. He also has no past medical history of Malignant hyperthermia due to anesthesia or Pseudocholinesterase deficiency. Mr. Thuan Mcpherson  has a past surgical history that includes hx colonoscopy; hx shoulder arthroscopy (Right, 2011); hx rotator cuff repair (Left, 2013); hx orthopaedic (Right, 03/09/2018); and hx orthopaedic (Left, 03/2019). Social History/Living Environment:      Prior Level of Function/Work/Activity:  Independent       Number of Personal Factors/Comorbidities that affect the Plan of Care: Brief history (0):  LOW COMPLEXITY   ASSESSMENT OF OCCUPATIONAL PERFORMANCE[de-identified]   Most Recent Physical Functioning:   Balance  Sitting: Intact  Standing: Intact; With support                    Coordination  Fine Motor Skills-Upper: Left Intact; Right Intact  Gross Motor Skills-Upper: Left Intact; Right Intact         Mental Status  Neurologic State: Alert  Orientation Level: Appropriate for age  Cognition: Appropriate decision making  Perception: Appears intact  Perseveration: No perseveration noted  Safety/Judgement: Awareness of environment                Basic ADLs (From Assessment) Complex ADLs (From Assessment)   Basic ADL  Feeding: Independent  Oral Facial Hygiene/Grooming: Supervision  Bathing: Moderate assistance  Upper Body Dressing: Supervision  Lower Body Dressing: Moderate assistance  Toileting: Minimum assistance     Grooming/Bathing/Dressing Activities of Daily Living     Cognitive Retraining  Safety/Judgement: Awareness of environment                 Functional Transfers  Bathroom Mobility: Moderate assistance  Toilet Transfer : Minimum assistance  Shower Transfer: Minimum assistance     Bed/Mat Mobility  Supine to Sit: Stand-by assistance  Sit to Stand: Minimum assistance  Stand to Sit: Minimum assistance  Bed to Chair: Minimum assistance  Scooting:  Additional time         Physical Skills Involved:  Range of Motion  Balance  Strength Cognitive Skills Affected (resulting in the inability to perform in a timely and safe manner):  none Psychosocial Skills Affected:  Environmental Adaptation   Number of elements that affect the Plan of Care: 3-5:  MODERATE COMPLEXITY   CLINICAL DECISION MAKIN[address] [place] [address] AM-PAC 6 Clicks   Daily Activity Inpatient Short Form  How much help from another person does the patient currently need. .. Total A Lot A Little None   1. Putting on and taking off regular lower body clothing? [] 1   [x] 2   [] 3   [] 4   2. Bathing (including washing, rinsing, drying)? [] 1   [x] 2   [] 3   [] 4   3. Toileting, which includes using toilet, bedpan or urinal?   [] 1   [] 2   [x] 3   [] 4   4. Putting on and taking off regular upper body clothing? [] 1   [] 2   [] 3   [x] 4   5. Taking care of personal grooming such as brushing teeth? [] 1   [] 2   [] 3   [x] 4   6. Eating meals? [] 1   [] 2   [] 3   [x] 4   © , Trustees of [address] [place] [address], under license to [employer]. All rights reserved     Score:  Initial: 19 Most Recent: X (Date: -- )    Interpretation of Tool:  Represents activities that are increasingly more difficult (i.e. Bed mobility, Transfers, Gait). Use of outcome tool(s) and clinical judgement create a POC that gives a: LOW COMPLEXITY            TREATMENT:   (In addition to Assessment/Re-Assessment sessions the following treatments were rendered)     Pre-treatment Symptoms/Complaints:  pt up in room no complaint of pain  Pain: Initial:      Post Session:  0     Self Care: (10 min): Procedure(s) (per grid) utilized to improve and/or restore self-care/home management as related to dressing. Required minimal verbal and manual cueing to facilitate activities of daily living skills and compensatory activities. OT evaluation completed   Treatment/Session Assessment:     Response to Treatment:  pt up in room tolerated well. Education:  [] Home Exercises  [x] Fall Precautions  [x] Hip Precautions [] Going Home Video  [] Knee/Hip Prosthesis Review  [x] Walker Management/Safety [x] Adaptive Equipment as Needed       Interdisciplinary Collaboration:   Physical Therapist  Occupational Therapist  Registered Nurse    After treatment position/precautions:   Up in chair  Bed/Chair-wheels locked  Call light within reach  RN notified  Family at bedside     Compliance with Program/Exercises: Compliant all of the time, Will assess as treatment progresses. Recommendations/Intent for next treatment session:  Treatment next visit will focus on increasing Mr. Rockwell independence with bed mobility, transfers, self care, functional mobility, modalities for pain, and patient education.       Total Treatment Duration:  OT Patient Time In/Time Out  Time In: 1320  Time Out: [address] Kriss Severin

## 2020-09-03 NOTE — PERIOP NOTES
TRANSFER - IN REPORT:    Verbal report received from Deena Love 1729 on Shirley Raddle  being received from 3rd floor ortho for routine progression of care      Report consisted of patients Situation, Background, Assessment and   Recommendations(SBAR). Information from the following report(s) Kardex and MAR was reviewed with the receiving nurse. Opportunity for questions and clarification was provided. Assessment completed upon patients arrival to unit and care assumed.

## 2020-09-03 NOTE — PROGRESS NOTES
09/03/20 1508   Oxygen Therapy   O2 Sat (%) 98 %   Pulse via Oximetry 105 beats per minute   O2 Device Room air   Incentive Spirometry Treatment   Actual Volume (ml) 3250 ml   Patient achieved  3250Ml/sec on IS. Patient encouraged to do every hour while awake-patient agreed and demonstrated. No shortness of breath or distress noted.    Joint Camp notes reviewed- continuous sat ordered HS

## 2020-09-03 NOTE — PROGRESS NOTES
Problem: Mobility Impaired (Adult and Pediatric)  Goal: *Acute Goals and Plan of Care (Insert Text)  Outcome: Progressing Towards Goal  Note: GOALS (1-4 days):  (1.)Mr. Lionel Menchaca will move from supine to sit and sit to supine  in bed with SUPERVISION. (2.)Mr. Lionel Menchaca will transfer from bed to chair and chair to bed with SUPERVISION using the least restrictive device. (3.)Mr. Lionel Menchaca will ambulate with SUPERVISION for 200 feet with the least restrictive device. (4.)Mr. Lionel Menchaca will ambulate up/down 4 steps with bilateral  railing with STAND BY ASSIST with no device. ________________________________________________________________________________________________      PHYSICAL THERAPY JOINT CAMP KASANDRA: Initial Assessment and Treatment Day: Day of Assessment 9/3/2020  INPATIENT: Hospital Day: 1  [place] / Product Type: Medicare /      NAME/AGE/GENDER: [address] is a 78 y.o. male   PRIMARY DIAGNOSIS:  Unilateral primary osteoarthritis, right hip [M16.11]   Procedure(s) and Anesthesia Type:     * RIGHT HIP ARTHROPLASTY TOTAL DIRECT ANTERIOR APPROACH/ DEPUY - Spinal (Right)  ICD-10: Treatment Diagnosis:    Pain in Right Hip (M25.551)  Stiffness of Right Hip, Not elsewhere classified (M25.651)  Difficulty in walking, Not elsewhere classified (R26.2)      ASSESSMENT:     Mr. Lionel Menchaca presents with limited ROM and strength following his anterior KASANDRA. He participated well but had poor proprioception and knees buckling during transfer due to block still somewhat intact. He is not able to walk or practice stairs and in my opinion is not safe for discharge home today. He should be able to go home tomorrow morning after AM therapy.      This section established at most recent assessment   PROBLEM LIST (Impairments causing functional limitations):  Decreased Transfer Abilities  Decreased Ambulation Ability/Technique  Decreased Balance  Increased Pain  Decreased strength   INTERVENTIONS PLANNED: (Benefits and precautions of physical therapy have been discussed with the patient.)  Bed Mobility  Cold  Gait Training  Home Exercise Program (HEP)  Range of Motion (ROM)  Therapeutic Activites  Therapeutic Exercise/Strengthening  Transfer Training     TREATMENT PLAN: Frequency/Duration: Follow patient BID for duration of hospital stay to address above goals. Rehabilitation Potential For Stated Goals: Excellent     RECOMMENDED REHABILITATION/EQUIPMENT: (at time of discharge pending progress): Continue Skilled Therapy and Home Health: Physical Therapy. HISTORY:   History of Present Injury/Illness (Reason for Referral): Admitted for anterior right KASANDRA  Past Medical History/Comorbidities:   Mr. Zee Reese  has a past medical history of Actinic keratosis, Adverse effect of anesthesia, CVA (cerebral vascular accident) [facility]), Erectile dysfunction, Former smoker, Hypertension, Osteoarthritis, Pain in joint involving pelvic region and thigh, Psychiatric disorder, and Rosacea. He also has no past medical history of Malignant hyperthermia due to anesthesia or Pseudocholinesterase deficiency. Mr. Zee Reese  has a past surgical history that includes hx colonoscopy; hx shoulder arthroscopy (Right, 2011); hx rotator cuff repair (Left, 2013); hx orthopaedic (Right, 03/09/2018); and hx orthopaedic (Left, 03/2019). Social History/Living Environment:   # Steps to Enter: 4  Rails to Enter: Yes  Living Alone: No  Patient [facility] Private residence  Prior Level of Function/Work/Activity:  Independent prior to admit   Number of Personal Factors/Comorbidities that affect the Plan of Care: 0: LOW COMPLEXITY   EXAMINATION:   Most Recent Physical Functioning:   Gross Assessment: Yes  Gross Assessment  AROM: Within functional limits  Strength:  Within functional limits  Sensation: Intact                     Bed Mobility  Supine to Sit: Stand-by assistance  Scooting: Additional time    Transfers  Sit to Stand: Minimum assistance  Stand to Sit: Minimum assistance  Bed to Chair: Minimum assistance    Balance  Sitting: Intact  Standing: Pull to stand; With support              Weight Bearing Status  Right Side Weight Bearing: As tolerated  Distance (ft): (bed to chair)  Ambulation - Level of Assistance: Minimal assistance;Assist x2  Assistive Device: Walker, rolling  Speed/Joy: Slow  Step Length: Left shortened  Stance: Right decreased  Gait Abnormalities: Antalgic;Decreased step clearance; Step to gait  Interventions: Verbal cues; Safety awareness training     Braces/Orthotics: none           Body Structures Involved:  Joints  Muscles Body Functions Affected: Movement Related Activities and Participation Affected: Mobility   Number of elements that affect the Plan of Care: 4+: HIGH COMPLEXITY   CLINICAL PRESENTATION:   Presentation: Stable and uncomplicated: LOW COMPLEXITY   CLINICAL DECISION MAKIN[address] [place] [id number] AM-PAC 6 Clicks   Basic Mobility Inpatient Short Form  How much difficulty does the patient currently have. .. Unable A Lot A Little None   1. Turning over in bed (including adjusting bedclothes, sheets and blankets)? [] 1   [] 2   [x] 3   [] 4   2. Sitting down on and standing up from a chair with arms ( e.g., wheelchair, bedside commode, etc.)   [] 1   [] 2   [x] 3   [] 4   3. Moving from lying on back to sitting on the side of the bed? [] 1   [] 2   [x] 3   [] 4   How much help from another person does the patient currently need. .. Total A Lot A Little None   4. Moving to and from a bed to a chair (including a wheelchair)? [] 1   [] 2   [x] 3   [] 4   5. Need to walk in hospital room? [] 1   [] 2   [x] 3   [] 4   6. Climbing 3-5 steps with a railing? [] 1   [] 2   [x] 3   [] 4   © , Trustees of [address] [place] [address], under license to [employer].  All rights reserved     Score:  Initial: 18 Most Recent: X (Date: -- ) Interpretation of Tool:  Represents activities that are increasingly more difficult (i.e. Bed mobility, Transfers, Gait). Medical Necessity:     Skilled intervention continues to be required due to limited functional independence. Reason for Services/Other Comments:  Patient continues to require skilled intervention due to   Limited functional independence  . Use of outcome tool(s) and clinical judgement create a POC that gives a: Clear prediction of patient's progress: LOW COMPLEXITY            TREATMENT:   (In addition to Assessment/Re-Assessment sessions the following treatments were rendered)     Pre-treatment Symptoms/Complaints:  none  Pain Initial:      Post Session:  0     Therapeutic Exercise: (10 Minutes):  Exercises per grid below to improve mobility and strength. Required minimal verbal cues to promote proper body alignment. Date:  9/3 Date:   Date:     ACTIVITY/EXERCISE AM PM AM PM AM PM   GROUP THERAPY  []  []  []  []  []  []   Ankle Pumps  10       Quad Sets  10       Gluteal Sets  10       Hip ABd/ADduction  10       Straight Leg Raises         Knee Slides  10       Short Arc Quads         Long Arc Quads  10       Chair Slides                  B = bilateral; AA = active assistive; A = active; P = passive      Treatment/Session Assessment:     Response to Treatment:  tolerated treatment well but spinal block still not completely worn off so couldn't progress with gait. Education:  [x] Home Exercises  [x] Fall Precautions  [] Hip Precautions [] D/C Instruction Review  [] Hip Prosthesis Review  [x] Walker Management/Safety [] Adaptive Equipment as Needed       Interdisciplinary Collaboration:   Physical Therapist  Occupational Therapist  Registered Nurse    After treatment position/precautions:   Up in chair  Bed/Chair-wheels locked  Bed in low position  Call light within reach  RN notified    Compliance with Program/Exercises: Compliant all of the time.     Recommendations/Intent for next treatment session:  Treatment next visit will focus on increasing Mr. Ahn's independence with bed mobility, transfers, gait training, strength/ROM exercises, modalities for pain, and patient education.       Total Treatment Duration:  PT Patient Time In/Time Out  Time In: 1340  Time Out: Miguel Almazan 95, PT

## 2020-09-03 NOTE — ANESTHESIA PROCEDURE NOTES
Spinal Block    Start time: 9/3/2020 8:14 AM  End time: 9/3/2020 8:22 AM  Performed by: Refugio Chase MD  Authorized by: Refugio Chase MD     Pre-procedure:   Indications: at surgeon's request and primary anesthetic  Preanesthetic Checklist: patient identified, risks and benefits discussed, anesthesia consent, patient being monitored and timeout performed    Timeout Time: 08:14          Spinal Block:   Patient Position:  Seated  Prep Region:  Lumbar  Prep: chlorhexidine      Location:  L3-4  Technique:  Single shot        Needle:   Needle Type:  Pencan  Needle Gauge:  25 G  Attempts:  2      Events: CSF confirmed, no blood with aspiration and no paresthesia        Assessment:  Insertion:  Uncomplicated  Patient tolerance:  Patient tolerated the procedure well with no immediate complications  1st attempt at L4-5 only bone, moved up to L3-4 and success

## 2020-09-03 NOTE — H&P
The patient has end stage arthritis of the right hip joint. The patient was seen and examined and there are no changes to the patient's orthopedic condition. They have tried conservative treatment for this condition; including antiinflammatories and lifestyle modifications and have failed. The necessity for the joint replacement is still present, and the H&P from the office is still current. The patient will be admitted today forProcedure(s) (LRB):  RIGHT HIP ARTHROPLASTY TOTAL DIRECT ANTERIOR APPROACH/ DEPUY (Right) .

## 2020-09-03 NOTE — ROUTINE PROCESS
Teach back method used with patient concerning antiseptic wash, TB screening, incentive spirometer, and pain management goals. Patient and family were provided with home discharge needs list. Wife to be in the IS.

## 2020-09-03 NOTE — ANESTHESIA PREPROCEDURE EVALUATION
Relevant Problems   No relevant active problems       Anesthetic History     Other anesthesia complications     Comments: Elevated LFTs after a GA in past     Review of Systems / Medical History  Patient summary reviewed, nursing notes reviewed and pertinent labs reviewed    Pulmonary  Within defined limits                 Neuro/Psych       CVA (r hand numbness)  Psychiatric history     Cardiovascular    Hypertension: well controlled              Exercise tolerance: >4 METS  Comments: MADELEINE 2019- normal EF no signif valve abnl   GI/Hepatic/Renal  Within defined limits              Endo/Other        Arthritis     Other Findings              Physical Exam    Airway  Mallampati: II    Neck ROM: normal range of motion   Mouth opening: Normal     Cardiovascular  Regular rate and rhythm,  S1 and S2 normal,  no murmur, click, rub, or gallop             Dental  No notable dental hx       Pulmonary  Breath sounds clear to auscultation               Abdominal         Other Findings            Anesthetic Plan    ASA: 3  Anesthesia type: spinal, general - backup and total IV anesthesia          Induction: Intravenous  Anesthetic plan and risks discussed with: Patient      If requires GA will use TIVA due to hx of elevated LFTs

## 2020-09-03 NOTE — PROGRESS NOTES
Care Management Interventions  PCP Verified by CM: Yes  Mode of Transport at Discharge: Self  Transition of Care Consult (CM Consult): Discharge Planning  Discharge Durable Medical Equipment: Yes  Physical Therapy Consult: Yes  Occupational Therapy Consult: Yes  Current Support Network: Lives with Spouse  Confirm Follow Up Transport: Family  Discharge Location  Discharge Placement: Home with outpatient services    Patient is a 78y.o. year old male admitted for Right KASANDRA . Patient plans to return home on discharge. Order received to arrange home health. Patient without preference towards agency. Referral sent to [facility] which was arranged by surgeon's office. . Patient requesting we arrange a walker. Referral sent to [address] [place] [address] Str. delivered to the hospital room prior to discharge. Will follow until discharge.

## 2020-09-03 NOTE — OP NOTES
[facility]  OPERATIVE REPORT  DIRECT ANTERIOR APPROACH TOTAL HIP ARTHROPLASTY    Billie Castleman,  MRN: [id number],  : 1950    Pre-operative Diagnosis:  Unilateral primary osteoarthritis, right hip [M16.11]    Post-operative Diagnosis: Unilateral primary osteoarthritis, right hip [M16.11]  Location: [place] [address]    Procedure: Left Total Hip Arthroplasty     Date of Surgery: 9/3/2020   BMI: Body mass index is 25.05 kg/m². Surgeon: Nikko Kurtz MD  Assistant:  VERENA Bravo  Anesthesia: Spinal/General    EBL:  200 cc    Procedure: Total Hip Arthroplasty using the Direct Anterior Approach    IMPLANTS: DePuy      INDICATIONS FOR PROCEDURE:  The patient has degenerative changes of their hip. Because of limitations and pain they requested total hip arthroplasty. Due to the complexity of the case a skilled first assistant was required. DESCRIPTION OF PROCEDURE:  The patient was taken to the operating room. Following an adequate anesthetic induction the patient was prepped and draped in the usual sterile fashion for the direct anterior approach with both limbs free. Preoperative antibiotics and transexamic acid were administered. A time out was preformed. The incision was made approximately 2 cm lateral to the anterior superior iliac spine and then directly toward Gerdy's tubercle of the knee. Dissection was carried through the  subcutaneous tissues and the fascia appreciated. The fascia was opened with Bovie cauterization. Finger dissection was then accomplished through the tensor musculature and sartorius interval from proximal to distal to expose the anterior aspect of the femoral neck. The rectus muscle was retracted anteriorly and a retractor placed inferiorly along the femoral head. A second retractor was also placed in the saddle of the greater trochanter.   The anterior circumflex artery and vein were identified and coagulated. The capsule was then T'd with a large anterior capsular flap developed. A small posterolateral flap was created. Both these flaps were tagged. The retractors were moved inside the capsule. An oscillating saw was then used to transect through the femoral neck at about 10 mm above the lesser trochanter. A second cut was made 7 mm proximal and a wafer of bone removed. The femoral head was then easily removed with a corkscrew. The capsular flaps were retracted anteriorly and posteriorly and retractors placed anteriorly and posteriorly along the acetabulum. Tissue was released inferiorly and then a labrum excision was accomplished. The patient was then sequentially reamed. Fluoroscopic guidance revealed appropriate position of the reamer. The size 54 [place] cup was then impacted at about 45 degrees of horizontal tilt and about 15 degrees of anteversion. Fluoroscopic guidance verified this and 1 screw was placed superiorly. The liner was impacted. Dissection was then carried around the posterior capsular area allowing mobilization of the femur. The foot of the table was dropped 45 degrees and the limb placed in a figure-of-four position. Dissection was then carried posterior and superior to release the piriformis tendon. Retractors were placed posterior to the greater trochanter and the femoral canal identified. A curette and rongeur were used to remove bone from the  posterolateral aspect of the neck. A box osteotome was used to further access the lateral aspect of the intertrochanteric ridge. A canal sounder was used to find the canal. The patient was then progressively broached to a size 7 high offset ACTIS stem. Trial reduction with a +5   mm head length revealed the limb lengths to be equilibrated. The patient was stable in extension and external rotation and was stable in flexion to 100 degrees and internal rotation. .     All trial components were removed.   The area was copiously irrigated. The permanent  ACTIS stem was impacted into the canal and was noted to be stable. The permanent Biolox Delta head was tamped into place. The hip was reduced, placed through range of motion and found to be stable and the length to be equilibrated. The area was infiltrated with local anesthetic. The area was soaked in Betadine and again irrigated . A  #1 Vicryl was used to reapproximate the flap capsule. A #1 running Vycryl was used to reapproximate the fascia anteriorly. Transexemic Acid with Vancomycin was injected in the joint. Monocryl 2-0 was used to close the subcutaneous layers. Prineo was used to close the skin. A sterile dressing was applied and the patient was transported to recovery room.         Jeremiah Malik MD

## 2020-09-04 VITALS
OXYGEN SATURATION: 99 % | DIASTOLIC BLOOD PRESSURE: 78 MMHG | WEIGHT: 169.6 LBS | SYSTOLIC BLOOD PRESSURE: 122 MMHG | HEART RATE: 79 BPM | TEMPERATURE: 98.1 F | RESPIRATION RATE: 18 BRPM | BODY MASS INDEX: 25.12 KG/M2 | HEIGHT: 69 IN

## 2020-09-04 LAB — HGB BLD-MCNC: 14.7 G/DL (ref 13.6–17.2)

## 2020-09-04 PROCEDURE — 77030041279 HC DRSG PRMSL AG MDII -B

## 2020-09-04 PROCEDURE — 74011250636 HC RX REV CODE- 250/636: Performed by: PHYSICIAN ASSISTANT

## 2020-09-04 PROCEDURE — 97110 THERAPEUTIC EXERCISES: CPT

## 2020-09-04 PROCEDURE — 36415 COLL VENOUS BLD VENIPUNCTURE: CPT

## 2020-09-04 PROCEDURE — 97116 GAIT TRAINING THERAPY: CPT

## 2020-09-04 PROCEDURE — 74011000250 HC RX REV CODE- 250: Performed by: PHYSICIAN ASSISTANT

## 2020-09-04 PROCEDURE — 74011250637 HC RX REV CODE- 250/637: Performed by: PHYSICIAN ASSISTANT

## 2020-09-04 PROCEDURE — 85018 HEMOGLOBIN: CPT

## 2020-09-04 RX ADMIN — ACETAMINOPHEN 1000 MG: 500 TABLET, FILM COATED ORAL at 05:45

## 2020-09-04 RX ADMIN — ACETAMINOPHEN 1000 MG: 500 TABLET, FILM COATED ORAL at 00:08

## 2020-09-04 RX ADMIN — Medication 1 AMPULE: at 08:21

## 2020-09-04 RX ADMIN — CELECOXIB 200 MG: 200 CAPSULE ORAL at 08:21

## 2020-09-04 RX ADMIN — CEFAZOLIN SODIUM 2 G: 100 INJECTION, POWDER, LYOPHILIZED, FOR SOLUTION INTRAVENOUS at 00:08

## 2020-09-04 RX ADMIN — OXYCODONE 10 MG: 5 TABLET ORAL at 08:21

## 2020-09-04 RX ADMIN — ASPIRIN 81 MG: 81 TABLET, COATED ORAL at 08:21

## 2020-09-04 RX ADMIN — DOCUSATE SODIUM 50MG AND SENNOSIDES 8.6MG 2 TABLET: 8.6; 5 TABLET, FILM COATED ORAL at 08:21

## 2020-09-04 RX ADMIN — OXYCODONE 10 MG: 5 TABLET ORAL at 00:07

## 2020-09-04 NOTE — PROGRESS NOTES
Problem: Falls - Risk of  Goal: *Absence of Falls  Description: Document Jimena Annette Fall Risk and appropriate interventions in the flowsheet.   Outcome: Progressing Towards Goal  Note: Fall Risk Interventions:  Mobility Interventions: Bed/chair exit alarm    Mentation Interventions: Bed/chair exit alarm    Medication Interventions: Bed/chair exit alarm    Elimination Interventions: Call light in reach

## 2020-09-04 NOTE — PROGRESS NOTES
OT note: Attempted to see pt this morning for shower and self care. Pt up in room with PT, without walker, and independent. Pt was dressed and reports no issues with self care. Pt is going home and should do well.    Manuel Heredia, OT

## 2020-09-04 NOTE — PROGRESS NOTES
Shift assessment complete. Pt resting in chair. A&Ox4. Surgical dressing to right hip c/d/i. Pedal pulses +2 and palpable. Pt able to dorsi/plantar flex. IV capped and patent. Pt denies pain at this time. Bed in lowest position, call light within reach, side rails x3. Encouraged to call for help when needed.

## 2020-09-04 NOTE — PROGRESS NOTES
I have reviewed discharge instructions provided to patient. Chance given to ask questions and answers provided. IV removed, tip intact. Rxs provided. Waiting to be wheeled downstairs.

## 2020-09-04 NOTE — DISCHARGE INSTRUCTIONS
[facility]   Patient Discharge Instructions    Kesha Monet / [id number] : 1950    Admitted 9/3/2020 Discharged: 2020     IF YOU HAVE ANY PROBLEMS ONCE YOU ARE AT HOME CALL THE FOLLOWING NUMBERS:   Main office number: [phone]    Take Home Medications     · It is important that you take the medication exactly as they are prescribed. · Keep your medication in the bottles provided by the pharmacist and keep a list of the medication names, dosages, and times to be taken in your wallet. · Do not take other medications without consulting your doctor. What to do at [address] your prehospital diet. If you have excessive nausea or vomitting call your doctor's office     Home Physical Therapy is arranged. Use rolling walker when walking. Patients who have had a joint replacement should not drive until you are seen for your follow up appointment by Dr. Marita Ornelas. When to Call    - Call if you have a temperature greater then 101  - Unable to keep food down  - Loose control of your bladder or bowel function  - Are unable to bear any weight   - Need a pain medication refill       DISCHARGE SUMMARY from Nurse    The following personal items collected during your admission are returned to you:   Dental Appliance: Dental Appliances: None  Vision: Visual Aid: None  Hearing Aid:    Jewelry: Jewelry: None  Clothing: Clothing: At bedside  Other Valuables: Other Valuables: Wallet(wife has)  Valuables sent to safe:      PATIENT INSTRUCTIONS:    After general anesthesia or intravenous sedation, for 24 hours or while taking prescription Narcotics:  · Limit your activities  · Do not drive and operate hazardous machinery  · Do not make important personal or business decisions  · Do  not drink alcoholic beverages  · If you have not urinated within 8 hours after discharge, please contact your surgeon on call.     Report the following to your surgeon:  · Excessive pain, swelling, redness or odor of or around the surgical area  · Temperature over 101  · Nausea and vomiting lasting longer than 4 hours or if unable to take medications  · Any signs of decreased circulation or nerve impairment to extremity: change in color, persistent  numbness, tingling, coldness or increase pain  · Any questions, call office @ [phone]      Keep scheduled follow up appointment. If need to change, call office @ [phone]. *  Please give a list of your current medications to your Primary Care Provider. *  Please update this list whenever your medications are discontinued, doses are      changed, or new medications (including over-the-counter products) are added. *  Please carry medication information at all times in case of emergency situations. Patient Education        Hip Replacement Surgery (Anterior): What to Expect at Home  Your Recovery     Anterior hip replacement surgery is done through a small incision in the front (anterior) of the hip. This causes less damage to muscles and other soft tissues than the more common type of surgery. There is also a lower risk of the new ball on the thighbone slipping out of the hip socket (dislocation). As a result, healing is usually faster, and there are fewer limits on activity. After surgery, you will use crutches or a walker. You will need someone to help you at home for a few days or weeks or until you have more energy and can move around better. If you need more extensive rehab, you may go to a specialized rehab center for more treatment. You will go home with a bandage and stitches, staples, tissue glue, or tape strips. You can remove the bandage when your doctor tells you to. If you have stitches or staples, your doctor will remove them 10 to 14 days after your surgery. Glue or tape strips will fall off on their own over time. You may have some mild pain and swelling after surgery. Your doctor may give you medicine for the pain.   You will keep doing the physical therapy you started in the hospital. The better you do with your exercises, the sooner you will get your strength and movement back. Your doctor will tell you when you can go back to work or other activities. This will depend on what type of work and activities you do. This care sheet gives you a general idea about how long it will take for you to recover. But each person recovers at a different pace. Follow the steps below to get better as quickly as possible. How can you care for yourself at home? Activity    · For the first few months, your doctor may want you to avoid things that could dislocate your hip. If so, your therapist may suggest ideas such as:  ? Do not turn your leg too far out to the side. Keep your toes pointing forward or slightly in.  ? Do not step backwards or bend backwards. ? Do not cross your legs.     · Go slowly when you climb stairs. Make sure the lights are on, and have someone watch you, if possible. When you climb stairs:  ? Step up first with your unaffected leg. Then bring the affected leg up to the same step. Bring your crutches or cane up. ? To go down stairs, reverse the order. First, put your crutches or cane on the lower step. Then bring the affected leg down to that step. Finally, step down with the unaffected leg.     · Rest when you feel tired. You may take a nap, but don't stay in bed all day.     · You may use crutches or a walker for a couple of weeks, and then switch to a cane.     · Try not to sit for too long at one time. You will feel less stiff if you take a short walk about every hour.     · Ask your doctor when you can drive again.     · Ask your doctor when it is okay for you to have sex. Diet    · By the time you leave the hospital, you will probably be eating your normal diet. If your stomach is upset, try bland, low-fat foods like plain rice, broiled chicken, toast, and yogurt.  Your doctor may recommend that you take iron and vitamin supplements.     · Eat healthy foods, and watch your portion sizes. Try to stay at your ideal weight. Controlling your weight will help your new hip joint last longer.     · If your bowel movements are not regular right after surgery, try to avoid constipation and straining. Drink plenty of water. Your doctor may suggest fiber, a stool softener, or a mild laxative. Medicines    · Be safe with medicines. Read and follow all instructions on the label. ? If the doctor gave you a prescription medicine for pain, take it as prescribed. ? If you are not taking a prescription pain medicine, ask your doctor if you can take an over-the-counter medicine.     · If your doctor prescribed antibiotics, take them as directed. Do not stop taking them just because you feel better. You need to take the full course of antibiotics.     · Your doctor will tell you if and when you can restart your medicines. He or she will also give you instructions about taking any new medicines.     · If you take aspirin or some other blood thinner, be sure to talk to your doctor. He or she will tell you if and when to start taking this medicine again. Make sure that you understand exactly what your doctor wants you to do.     · Your doctor may give you a blood-thinning medicine to prevent blood clots for a few weeks after surgery. Be sure you get instructions about how to take your medicine safely. Blood thinners can cause serious bleeding problems. This medicine could be in pill form or as a shot (injection). If a shot is necessary, your doctor will tell you how to do this. Incision care    · If your doctor told you how to care for your cut (incision), follow your doctor's instructions. You will have a dressing over the cut. A dressing helps the incision heal and protects it. Your doctor will tell you how to take care of this.     · If you did not get instructions, follow this general advice:  ?  If you have strips of tape on the cut the doctor made, leave the tape on for a week or until it falls off.  ? If you have stitches or staples, your doctor will tell you when to come back to have them removed. ? If you have skin adhesive on the cut, leave it on until it falls off. Skin adhesive is also called glue or liquid stitches. ? Change the bandage every day. ? Wash the area daily with warm water, and pat it dry. Don't use hydrogen peroxide or alcohol. They can slow healing. ? You may cover the area with a gauze bandage if it oozes fluid or rubs against clothing. ? You may shower 24 to 48 hours after surgery. Pat the incision dry. Don't swim or take a bath for the first 2 weeks, or until your doctor tells you it is okay. Exercise    · Your physical therapist will teach you exercises to do at home. Always do them as your therapist tells you.     · Your doctor may advise you to stay away from activities that put stress on the joint. This includes sports such as tennis, football, and jogging.     · Avoid activities where you might fall. These include motorcycle or horseback riding, water-skiing, and mountain biking. Ice and elevation    · For pain, put ice or a cold pack on the area for 10 to 20 minutes at a time. Put a thin cloth between the ice and your skin.     · Your ankle may swell for a few weeks after hip surgery. Prop up your ankle when you ice your hip or anytime you sit or lie down. Try to keep it above the level of your heart. This will help reduce swelling. Other instructions    · Keep wearing your support stockings. These help to prevent blood clots. Your doctor will tell you how long you need to keep wearing them.     · Try to prevent falls. To avoid falling:  ? Arrange furniture so that you will not trip on it. ? Get rid of throw rugs, and move electrical cords out of the way. ? Put grab bars in showers and bathtubs. ? Wear shoes with sturdy, flat soles. ? Walk only in areas with plenty of light. ? Avoid icy or snowy sidewalks. Follow-up care is a key part of your treatment and safety. Be sure to make and go to all appointments, and call your doctor if you are having problems. It's also a good idea to know your test results and keep a list of the medicines you take. When should you call for help? Call 911 anytime you think you may need emergency care. For example, call if:    · You passed out (lost consciousness).     · You have severe trouble breathing.     · You have sudden chest pain and shortness of breath, or you cough up blood. Call your doctor now or seek immediate medical care if:    · You have symptoms of a blood clot in your leg (called a deep vein thrombosis), such as:  ? Pain in your calf, back of the knee, thigh, or groin. ? Redness and swelling in your leg or groin.     · You have signs of infection, such as:  ? Increased pain, swelling, warmth, or redness. ? Red streaks leading from the incision. ? Pus draining from the incision. ? A fever.     · Your leg or foot turns cold or changes color.     · You have tingling, weakness, or numbness in your leg or foot.     · You have signs that your hip may be dislocated, including:  ? Severe pain and not being able to stand. ? A crooked leg that looks like your hip is out of position. ? Not being able to bend or straighten your leg.     · You have pain that does not get better after you take pain medicine.     · You have loose stitches, or your incision comes open. Watch closely for changes in your health, and be sure to contact your doctor if:    · You do not have a bowel movement after taking a laxative.     · You do not get better as expected. Where can you learn more? Go to http://www.gray.com/  Enter J451 in the search box to learn more about \"Hip Replacement Surgery (Anterior): What to Expect at Home. \"  Current as of: March 2, 2020               Content Version: 12.6  © 3308-6747 [employer].    Care instructions adapted under license by [employer] (which disclaims liability or warranty for this information). If you have questions about a medical condition or this instruction, always ask your healthcare professional. [address] any warranty or liability for your use of this information. These are general instructions for a healthy lifestyle:    No smoking/ No tobacco products/ Avoid exposure to second hand smoke    Surgeon General's Warning:  Quitting smoking now greatly reduces serious risk to your health. Obesity, smoking, and sedentary lifestyle greatly increases your risk for illness    A healthy diet, regular physical exercise & weight monitoring are important for maintaining a healthy lifestyle    You may be retaining fluid if you have a history of heart failure or if you experience any of the following symptoms:  Weight gain of 3 pounds or more overnight or 5 pounds in a week, increased swelling in our hands or feet or shortness of breath while lying flat in bed. Please call your doctor as soon as you notice any of these symptoms; do not wait until your next office visit. Recognize signs and symptoms of STROKE:    F-face looks uneven    A-arms unable to move or move even    S-speech slurred or non-existent    T-time-call 911 as soon as signs and symptoms begin-DO NOT go       Back to bed or wait to see if you get better-TIME IS BRAIN. The discharge information has been reviewed with the patient. The patient verbalized understanding. Information obtained by :  I understand that if any problems occur once I am at home I am to contact my physician. I understand and acknowledge receipt of the instructions indicated above. Jessy Chin                                                                                                                                           Physician's or R.N.'s Signature Date/Time                                                                                                                                              Patient or Representative Signature                                                          Date/Time

## 2020-09-04 NOTE — PROGRESS NOTES
Problem: Mobility Impaired (Adult and Pediatric)  Goal: *Acute Goals and Plan of Care (Insert Text)  Outcome: Progressing Towards Goal  Note: GOALS (1-4 days):  (1.)Mr. Urvashi Ashraf will move from supine to sit and sit to supine  in bed with SUPERVISION. (2.)Mr. Urvashi Ashraf will transfer from bed to chair and chair to bed with SUPERVISION using the least restrictive device. (3.)Mr. Urvashi Ashraf will ambulate with SUPERVISION for 200 feet with the least restrictive device. (4.)Mr. Urvashi Ashraf will ambulate up/down 4 steps with bilateral  railing with STAND BY ASSIST with no device. ________________________________________________________________________________________________      PHYSICAL THERAPY JOINT CAMP KASANDRA: Daily Note and AM 9/4/2020  INPATIENT: Hospital Day: 2  [place] / [place] PART [place] B / Product Type: Medicare /      NAME/AGE/GENDER: Maxi Reed is a 78 y.o. male   PRIMARY DIAGNOSIS:  Unilateral primary osteoarthritis, right hip [M16.11]   Procedure(s) and Anesthesia Type:     * RIGHT HIP ARTHROPLASTY TOTAL DIRECT ANTERIOR APPROACH/ DEPUY - Spinal (Right)  ICD-10: Treatment Diagnosis:    · Pain in Right Hip (M25.551)  · Stiffness of Right Hip, Not elsewhere classified (M25.651)  · Difficulty in walking, Not elsewhere classified (R26.2)      ASSESSMENT:     Mr. Urvashi Ashraf presents with limited ROM and strength following his anterior KASANDRA. He participated well but had poor proprioception and knees buckling during transfer due to block still somewhat intact. He is not able to walk or practice stairs and in my opinion is not safe for discharge home today. He should be able to go home tomorrow morning after AM therapy. Did well with therapy and is up ambulating in room independently mostly without using his walker. I advised him to use his RW until HHPT comes but I suspect he will not so I advised him to at least use a cane.  He ambulated in halls, practiced stairs and is independent with HEP. Going home this morning. This section established at most recent assessment   PROBLEM LIST (Impairments causing functional limitations):  1. Decreased Transfer Abilities  2. Decreased Ambulation Ability/Technique  3. Decreased Balance  4. Increased Pain  5. Decreased strength   INTERVENTIONS PLANNED: (Benefits and precautions of physical therapy have been discussed with the patient.)  1. Bed Mobility  2. Cold  3. Gait Training  4. Home Exercise Program (HEP)  5. Range of Motion (ROM)  6. Therapeutic Activites  7. Therapeutic Exercise/Strengthening  8. Transfer Training     TREATMENT PLAN: Frequency/Duration: Follow patient BID for duration of hospital stay to address above goals. Rehabilitation Potential For Stated Goals: Excellent     RECOMMENDED REHABILITATION/EQUIPMENT: (at time of discharge pending progress): Continue Skilled Therapy and Home Health: Physical Therapy. HISTORY:   History of Present Injury/Illness (Reason for Referral): Admitted for anterior right KASANDRA  Past Medical History/Comorbidities:   Mr. Jass Pierre  has a past medical history of Actinic keratosis, Adverse effect of anesthesia, CVA (cerebral vascular accident) [facility]), Erectile dysfunction, Former smoker, Hypertension, Osteoarthritis, Pain in joint involving pelvic region and thigh, Psychiatric disorder, and Rosacea. He also has no past medical history of Malignant hyperthermia due to anesthesia or Pseudocholinesterase deficiency. Mr. Jass Pierre  has a past surgical history that includes hx colonoscopy; hx shoulder arthroscopy (Right, 2011); hx rotator cuff repair (Left, 2013); hx orthopaedic (Right, 03/09/2018); and hx orthopaedic (Left, 03/2019).   Social History/Living Environment:   Home Environment: Private residence  # Steps to Enter: 4  Rails to Enter: Yes  One/Two Story Residence: One story  Living Alone: No  Support Systems: Family member(s)  Patient Expects to be Discharged Merged with [employer] [facility] [employer] residence  Current DME Used/Available at Home: None  Prior Level of Function/Work/Activity:  Independent prior to admit   Number of Personal Factors/Comorbidities that affect the Plan of Care: 0: LOW COMPLEXITY   EXAMINATION:   Most Recent Physical Functioning:                                 Transfers  Sit to Stand: Independent  Stand to Sit: Independent  Bed to Chair: Independent    Balance  Sitting: Intact  Standing: Pull to stand; Without support              Weight Bearing Status  Right Side Weight Bearing: As tolerated  Distance (ft): 540 Feet (ft)  Ambulation - Level of Assistance: Modified independent  Assistive Device: Walker, rolling(could safely use a cane)  Speed/Joy: Accelerated  Gait Abnormalities: Antalgic;Decreased step clearance  Number of Stairs Trained: 3  Stairs - Level of Assistance: Stand-by assistance  Interventions: Verbal cues; Safety awareness training     Braces/Orthotics: none           Body Structures Involved:  1. Joints  2. Muscles Body Functions Affected:  1. Movement Related Activities and Participation Affected:  1. Mobility   Number of elements that affect the Plan of Care: 4+: HIGH COMPLEXITY   CLINICAL PRESENTATION:   Presentation: Stable and uncomplicated: LOW COMPLEXITY   CLINICAL DECISION MAKING:   [facility] 6 Clicks   Basic Mobility Inpatient Short Form  How much difficulty does the patient currently have. .. Unable A Lot A Little None   1. Turning over in bed (including adjusting bedclothes, sheets and blankets)? [] 1   [] 2   [x] 3   [] 4   2. Sitting down on and standing up from a chair with arms ( e.g., wheelchair, bedside commode, etc.)   [] 1   [] 2   [x] 3   [] 4   3. Moving from lying on back to sitting on the side of the bed? [] 1   [] 2   [x] 3   [] 4   How much help from another person does the patient currently need. .. Total A Lot A Little None   4. Moving to and from a bed to a chair (including a wheelchair)?   [] 1   [] 2   [x] 3   [] 4 5.  Need to walk in hospital room? [] 1   [] 2   [x] 3   [] 4   6. Climbing 3-5 steps with a railing? [] 1   [] 2   [x] 3   [] 4   © 2007, Trustees of [address] [place] [address] [address] [id number], under license to [employer]. All rights reserved     Score:  Initial: 18 Most Recent: X (Date: -- )    Interpretation of Tool:  Represents activities that are increasingly more difficult (i.e. Bed mobility, Transfers, Gait). Medical Necessity:     · Skilled intervention continues to be required due to limited functional independence. Reason for Services/Other Comments:  · Patient continues to require skilled intervention due to   · Limited functional independence  · . Use of outcome tool(s) and clinical judgement create a POC that gives a: Clear prediction of patient's progress: LOW COMPLEXITY            TREATMENT:   (In addition to Assessment/Re-Assessment sessions the following treatments were rendered)     Pre-treatment Symptoms/Complaints:  none  Pain Initial:      Post Session:  0   Gait Training (15 Minutes):  Gait training to improve and/or restore physical functioning as related to mobility and strength. Ambulated 540 Feet (ft) with Modified independent using a Walker, rolling(could safely use a cane) and minimal Verbal cues; Safety awareness training related to their stride length to promote proper body alignment. Therapeutic Exercise: (10 Minutes):  Exercises per grid below to improve mobility and strength. Required minimal verbal cues to promote proper body alignment.      Date:  9/3 Date:  9/4 Date:     ACTIVITY/EXERCISE AM PM AM PM AM PM   GROUP THERAPY  []  []  []  []  []  []   Ankle Pumps  10 20      Quad Sets  10 20      Gluteal Sets  10 20      Hip ABd/ADduction  10 20      Straight Leg Raises         Knee Slides  10 20      Short Arc Quads   20      Long Arc Quads  10 20      Chair Slides                  B = bilateral; AA = active assistive; A = active; P = passive      Treatment/Session Assessment:     Response to Treatment:   Very eager to go home and moving very well    Education:  [x] Home Exercises  [x] Fall Precautions  [] Hip Precautions [] D/C Instruction Review  [] Hip Prosthesis Review  [x] Walker Management/Safety [] Adaptive Equipment as Needed       Interdisciplinary Collaboration:   o Physical Therapist  o Registered Nurse    After treatment position/precautions:   o Up in chair  o Bed/Chair-wheels locked  o Bed in low position  o Call light within reach  o RN notified    Compliance with Program/Exercises: Compliant all of the time. Recommendations/Intent for next treatment session:  Treatment next visit will focus on increasing Mr. Darnells independence with bed mobility, transfers, gait training, strength/ROM exercises, modalities for pain, and patient education.       Total Treatment Duration:  PT Patient Time In/Time Out  Time In: 0900  Time Out: DAVONTE De La O

## 2020-09-04 NOTE — PROGRESS NOTES
09/03/20 2031   Oxygen Therapy   O2 Sat (%) 98 %   Pulse via Oximetry 82 beats per minute   O2 Device Room air   Incentive Spirometry Treatment   Actual Volume (ml) 3000 ml   Number of Attempts 2   Pt on continuous monitor for HS. Alarm limits set.  Pt working on IS

## 2020-09-08 ENCOUNTER — PATIENT OUTREACH (OUTPATIENT)
Dept: CASE MANAGEMENT | Age: 70
End: 2020-09-08

## 2020-09-08 NOTE — PROGRESS NOTES
Transition of Care Hospital Discharge Follow-Up      Date/Time:  2020 11:42 AM    Patient was admitted to [facility] on 9/3/20 and discharged on 20 for Right KASANDRA. The physician discharge summary was not available at the time of outreach. Patient was contacted within 1 business days of discharge. Inpatient RUR score: 6   Was this a readmission? No, planned/elective surgery   Patient stated reason for the readmission: n/a  Patients top risk factors for readmission: complication of surgery      LPN Care Coordinator contacted the patient by telephone to perform post hospital discharge assessment. Verified name and  with patient as identifiers. Provided introduction to self, and explanation of the Care Coordinator role. Patient received hospital discharge instructions. LPN reviewed discharge instructions and red flags with patient who verbalized understanding. Patient given an opportunity to ask questions and does not have any further questions or concerns at this time. The patient agrees to contact the PCP office for questions related to their healthcare. LPN provided contact information for future reference. Home Health orders at discharge: [address]: Huma Backer  Date of initial or scheduled visit: 20  (Assist with coordination of services if necessary.)    Durable Medical Equipment ordered at discharge: walker  [address] [facility] [address]: [address] received: yes  (Assist patient in obtaining DME orders &/or equipment if necessary.)    Medication(s):   Medication review was performed with patient, who verbalizes understanding of administration of home medications. There were no barriers to obtaining medications identified at this time. Current Outpatient Medications   Medication Sig    aspirin delayed-release 81 mg tablet Take 1 Tab by mouth every twelve (12) hours every twelve (12) hours.     ergocalciferol (ERGOCALCIFEROL) 1,250 mcg (50,000 unit) capsule Day 1 and 15 of each month    losartan (COZAAR) 50 mg tablet Take 1 Tab by mouth nightly. Indications: high blood pressure    tadalafil (CIALIS) 20 mg tablet TAKE 1 TABLET AS NEEDED FOR ERECTILE DYSFUNCTION    ALPRAZolam (XANAX) 2 mg tablet Take 0.5-1 Tabs by mouth two (2) times daily as needed for Anxiety. Max Daily Amount: 4 mg. Indications: anxiety (Patient taking differently: Take 2 mg by mouth as needed for Anxiety. Indications: anxious)    melatonin 3 mg tablet Take 3 mg by mouth nightly. No current facility-administered medications for this visit. There are no discontinued medications. ADL assessment: independent  (If patient is unable to perform ADLs  what is the limiting factor(s)? Do they have a support system that can assist? If no support system is present, discuss possible assistance that they may be able to obtain. Escalate for SW if ongoing issues are verbalized by pt or anticipated)    [facility] follow up appointment(s):   Future Appointments   Date Time Provider Vicki Laurent   11/5/2020  9:30 AM [facility] FIM FIM   11/5/2020  9:45 AM Latrell Rodrigues MD [facility] FIM FIM   11/10/2020  9:45 AM [facility]   12/16/2020 10:20 AM [facility]   12/28/2020  1:40 PM Sylvain Banerjee MD END BS ENDO      Non-[facility] follow up appointment(s): Dr. Darrel Chisholm 9/28/20  7 Day follow up with PCP or Specialist: will call if need arises  Transportation arranged: no needs at this time    Covid Risk Education    Patient has following risk factors of: no known risk factors and preop test negative. Education provided regarding infection prevention, and signs and symptoms of COVID-19 and when to seek medical attention with patient who verbalized understanding. Discussed exposure protocols and quarantine From CDC: Are you at higher risk for severe illness?  and given an opportunity for questions and concerns.  The patient agrees to contact the COVID-19 hotline [phone] or PCP office for questions related to COVID-19. For more information on steps you can take to protect yourself, see CDC's How to Protect Yourself     Patient/family/caregiver given information for [employer] and agrees to enroll no    Any other questions or concerns expressed by patient?  Not at this time    Scheduled next follow up call or referral to CTN/ ACM: [facility] will follow per workflow guidelines  Next follow up call: within 21 days

## 2020-09-29 ENCOUNTER — PATIENT OUTREACH (OUTPATIENT)
Dept: CASE MANAGEMENT | Age: 70
End: 2020-09-29

## 2020-09-29 NOTE — PROGRESS NOTES
Transition of Care Hospital Discharge Follow-Up      Date/Time:  2020 1:01 PM    LPN Care Coordinator contacted the patient by telephone to perform post hospital follow up. Verified name and  with patient as identifiers. LPN reinforced discharge instructions and red flags with patient who verbalized understanding. Patient given an opportunity to ask questions and does not have any further questions or concerns at this time. The patient agrees to contact the PCP office for questions related to their healthcare    Pending sale to [facility] follow up appointment(s):   Future Appointments   Date Time Provider Vicki Laurent   2020  9:30 AM FIM WELLNESS [facility] FIM FIM   2020  9:45 AM Yesica Rodrigues MD [facility] FIM FIM   11/10/2020  9:45 AM Shirley Lockwood MD [facility]   2020 10:20 AM TIRSO/CLEMSON DEVICE 55 [facility]   2020  1:40 PM Snow Roblero MD END BS ENDO      Non-[facility] follow up appointment(s): Dr. Akash Daniel 10/12/20  Patient attended follow up appointments since last contact: Patient states he is progressing well  What was the outcome of the appointment:     [address]: discharged with [address]: Linda Chamberlain  Any issues/ progress:  (Assist with coordination of services if necessary.)      Medication(s):   Medication changes since discharge:     Current Outpatient Medications   Medication Sig    aspirin delayed-release 81 mg tablet Take 1 Tab by mouth every twelve (12) hours every twelve (12) hours.  ergocalciferol (ERGOCALCIFEROL) 1,250 mcg (50,000 unit) capsule Day 1 and 15 of each month    losartan (COZAAR) 50 mg tablet Take 1 Tab by mouth nightly. Indications: high blood pressure    tadalafil (CIALIS) 20 mg tablet TAKE 1 TABLET AS NEEDED FOR ERECTILE DYSFUNCTION    ALPRAZolam (XANAX) 2 mg tablet Take 0.5-1 Tabs by mouth two (2) times daily as needed for Anxiety. Max Daily Amount: 4 mg.  Indications: anxiety (Patient taking differently: Take 2 mg by mouth as needed for Anxiety. Indications: anxious)    melatonin 3 mg tablet Take 3 mg by mouth nightly. No current facility-administered medications for this visit. Other Issues/ Miscellaneous? (Transportation, access to meals, ability to perform ADLs, adequate caregiver support, etc.) no  Referrals needed? (SW, Diabetes education, HH, etc.)no    Any other questions or concerns expressed by patient?  Not at this time    Schedule next appointment with [facility] or referral to CTN/ ACM: n/a  Graduation: yes  Next Outreach Scheduled: n/a

## 2020-11-05 PROBLEM — R35.1 NOCTURIA: Status: ACTIVE | Noted: 2020-11-05

## 2020-11-10 PROBLEM — R97.20 ELEVATED PSA: Status: ACTIVE | Noted: 2020-11-10

## 2020-12-10 ENCOUNTER — HOSPITAL ENCOUNTER (OUTPATIENT)
Dept: LAB | Age: 70
Discharge: HOME | End: 2020-12-10
Payer: MEDICARE

## 2020-12-10 DIAGNOSIS — I47.20 VT (VENTRICULAR TACHYCARDIA): ICD-10-CM

## 2020-12-10 LAB
ANION GAP SERPL CALC-SCNC: 3 MMOL/L (ref 7–16)
BUN SERPL-MCNC: 8 MG/DL (ref 8–23)
CALCIUM SERPL-MCNC: 10.3 MG/DL (ref 8.3–10.4)
CHLORIDE SERPL-SCNC: 106 MMOL/L (ref 98–107)
CO2 SERPL-SCNC: 28 MMOL/L (ref 21–32)
CREAT SERPL-MCNC: 0.82 MG/DL (ref 0.8–1.5)
GLUCOSE SERPL-MCNC: 97 MG/DL (ref 65–100)
MAGNESIUM SERPL-MCNC: 2.2 MG/DL (ref 1.8–2.4)
POTASSIUM SERPL-SCNC: 5.3 MMOL/L (ref 3.5–5.1)
SODIUM SERPL-SCNC: 137 MMOL/L (ref 136–145)

## 2020-12-10 PROCEDURE — 36415 COLL VENOUS BLD VENIPUNCTURE: CPT

## 2020-12-10 PROCEDURE — 83735 ASSAY OF MAGNESIUM: CPT

## 2020-12-10 PROCEDURE — 80048 BASIC METABOLIC PNL TOTAL CA: CPT

## 2020-12-14 PROBLEM — R00.0 WIDE-COMPLEX TACHYCARDIA: Status: ACTIVE | Noted: 2020-12-14

## 2020-12-31 ENCOUNTER — HOSPITAL ENCOUNTER (OUTPATIENT)
Dept: LAB | Age: 70
Discharge: HOME | End: 2020-12-31
Payer: MEDICARE

## 2020-12-31 DIAGNOSIS — R00.0 WIDE-COMPLEX TACHYCARDIA: ICD-10-CM

## 2020-12-31 LAB
ANION GAP SERPL CALC-SCNC: 6 MMOL/L (ref 7–16)
BASOPHILS # BLD: 0 K/UL (ref 0–0.2)
BASOPHILS NFR BLD: 0 % (ref 0–2)
BUN SERPL-MCNC: 9 MG/DL (ref 8–23)
CALCIUM SERPL-MCNC: 9.9 MG/DL (ref 8.3–10.4)
CHLORIDE SERPL-SCNC: 103 MMOL/L (ref 98–107)
CO2 SERPL-SCNC: 29 MMOL/L (ref 21–32)
CREAT SERPL-MCNC: 0.78 MG/DL (ref 0.8–1.5)
DIFFERENTIAL METHOD BLD: ABNORMAL
EOSINOPHIL # BLD: 0.2 K/UL (ref 0–0.8)
EOSINOPHIL NFR BLD: 3 % (ref 0.5–7.8)
ERYTHROCYTE [DISTWIDTH] IN BLOOD BY AUTOMATED COUNT: 12.6 % (ref 11.9–14.6)
GLUCOSE SERPL-MCNC: 112 MG/DL (ref 65–100)
HCT VFR BLD AUTO: 46.3 % (ref 41.1–50.3)
HGB BLD-MCNC: 15.9 G/DL (ref 13.6–17.2)
IMM GRANULOCYTES # BLD AUTO: 0 K/UL (ref 0–0.5)
IMM GRANULOCYTES NFR BLD AUTO: 0 % (ref 0–5)
LYMPHOCYTES # BLD: 1.3 K/UL (ref 0.5–4.6)
LYMPHOCYTES NFR BLD: 16 % (ref 13–44)
MAGNESIUM SERPL-MCNC: 2.1 MG/DL (ref 1.8–2.4)
MCH RBC QN AUTO: 33.1 PG (ref 26.1–32.9)
MCHC RBC AUTO-ENTMCNC: 34.3 G/DL (ref 31.4–35)
MCV RBC AUTO: 96.5 FL (ref 79.6–97.8)
MONOCYTES # BLD: 0.5 K/UL (ref 0.1–1.3)
MONOCYTES NFR BLD: 7 % (ref 4–12)
NEUTS SEG # BLD: 5.9 K/UL (ref 1.7–8.2)
NEUTS SEG NFR BLD: 74 % (ref 43–78)
NRBC # BLD: 0 K/UL (ref 0–0.2)
PLATELET # BLD AUTO: 239 K/UL (ref 150–450)
PMV BLD AUTO: 10.4 FL (ref 9.4–12.3)
POTASSIUM SERPL-SCNC: 4.1 MMOL/L (ref 3.5–5.1)
RBC # BLD AUTO: 4.8 M/UL (ref 4.23–5.6)
SODIUM SERPL-SCNC: 138 MMOL/L (ref 138–145)
WBC # BLD AUTO: 8 K/UL (ref 4.3–11.1)

## 2020-12-31 PROCEDURE — 36415 COLL VENOUS BLD VENIPUNCTURE: CPT

## 2020-12-31 PROCEDURE — 80048 BASIC METABOLIC PNL TOTAL CA: CPT

## 2020-12-31 PROCEDURE — 85025 COMPLETE CBC W/AUTO DIFF WBC: CPT

## 2020-12-31 PROCEDURE — 83735 ASSAY OF MAGNESIUM: CPT

## 2021-01-06 ENCOUNTER — ANESTHESIA EVENT (OUTPATIENT)
Dept: SURGERY | Age: 71
End: 2021-01-06
Payer: MEDICARE

## 2021-01-06 NOTE — PROGRESS NOTES
Patient pre-assessment complete for EPS poss ablation with Dr Federico Olivera scheduled for 21 at 9:30am, arrival time 7:30am. Patient verified using . Patient instructed to bring all home medications in labeled bottles on the day of procedure. NPO status reinforced. Patient instructed to HOLD xarelto & losartan  tonight. Instructed they can take all other medications excluding vitamins & supplements. Patient verbalizes understanding of all instructions & denies any questions at this time.

## 2021-01-07 ENCOUNTER — ANESTHESIA (OUTPATIENT)
Dept: SURGERY | Age: 71
End: 2021-01-07
Payer: MEDICARE

## 2021-01-07 ENCOUNTER — HOSPITAL ENCOUNTER (OUTPATIENT)
Dept: CARDIAC CATH/INVASIVE PROCEDURES | Age: 71
Discharge: HOME | End: 2021-01-07
Attending: INTERNAL MEDICINE | Admitting: INTERNAL MEDICINE
Payer: MEDICARE

## 2021-01-07 VITALS
TEMPERATURE: 96.8 F | BODY MASS INDEX: 22.75 KG/M2 | WEIGHT: 168 LBS | HEIGHT: 72 IN | HEART RATE: 65 BPM | OXYGEN SATURATION: 96 % | DIASTOLIC BLOOD PRESSURE: 91 MMHG | SYSTOLIC BLOOD PRESSURE: 150 MMHG | RESPIRATION RATE: 14 BRPM

## 2021-01-07 DIAGNOSIS — R00.0 WIDE-COMPLEX TACHYCARDIA: ICD-10-CM

## 2021-01-07 LAB
ANION GAP SERPL CALC-SCNC: 3 MMOL/L (ref 7–16)
ATRIAL RATE: 67 BPM
BUN SERPL-MCNC: 8 MG/DL (ref 8–23)
CALCIUM SERPL-MCNC: 10.2 MG/DL (ref 8.3–10.4)
CALCULATED P AXIS, ECG09: 61 DEGREES
CALCULATED R AXIS, ECG10: 73 DEGREES
CALCULATED T AXIS, ECG11: 63 DEGREES
CHLORIDE SERPL-SCNC: 108 MMOL/L (ref 98–107)
CO2 SERPL-SCNC: 29 MMOL/L (ref 21–32)
CREAT SERPL-MCNC: 0.75 MG/DL (ref 0.8–1.5)
DIAGNOSIS, 93000: NORMAL
ERYTHROCYTE [DISTWIDTH] IN BLOOD BY AUTOMATED COUNT: 12.6 % (ref 11.9–14.6)
GLUCOSE SERPL-MCNC: 82 MG/DL (ref 65–100)
HCT VFR BLD AUTO: 49.2 % (ref 41.1–50.3)
HGB BLD-MCNC: 17.1 G/DL (ref 13.6–17.2)
INR PPP: 1
MAGNESIUM SERPL-MCNC: 2.2 MG/DL (ref 1.8–2.4)
MCH RBC QN AUTO: 33.1 PG (ref 26.1–32.9)
MCHC RBC AUTO-ENTMCNC: 34.8 G/DL (ref 31.4–35)
MCV RBC AUTO: 95.2 FL (ref 79.6–97.8)
NRBC # BLD: 0 K/UL (ref 0–0.2)
P-R INTERVAL, ECG05: 126 MS
PLATELET # BLD AUTO: 218 K/UL (ref 150–450)
PMV BLD AUTO: 9.8 FL (ref 9.4–12.3)
POTASSIUM SERPL-SCNC: 4.1 MMOL/L (ref 3.5–5.1)
PROTHROMBIN TIME: 13.5 SEC (ref 12.5–14.7)
Q-T INTERVAL, ECG07: 404 MS
QRS DURATION, ECG06: 76 MS
QTC CALCULATION (BEZET), ECG08: 426 MS
RBC # BLD AUTO: 5.17 M/UL (ref 4.23–5.6)
SODIUM SERPL-SCNC: 140 MMOL/L (ref 138–145)
VENTRICULAR RATE, ECG03: 67 BPM
WBC # BLD AUTO: 5.7 K/UL (ref 4.3–11.1)

## 2021-01-07 PROCEDURE — C1759 CATH, INTRA ECHOCARDIOGRAPHY: HCPCS

## 2021-01-07 PROCEDURE — 85027 COMPLETE CBC AUTOMATED: CPT

## 2021-01-07 PROCEDURE — 93662 INTRACARDIAC ECG (ICE): CPT

## 2021-01-07 PROCEDURE — 77030035291 HC TBNG PMP SMARTABLATE J&J -B

## 2021-01-07 PROCEDURE — 74011000250 HC RX REV CODE- 250: Performed by: INTERNAL MEDICINE

## 2021-01-07 PROCEDURE — 93623 PRGRMD STIMJ&PACG IV RX NFS: CPT

## 2021-01-07 PROCEDURE — 93653 COMPRE EP EVAL TX SVT: CPT | Performed by: INTERNAL MEDICINE

## 2021-01-07 PROCEDURE — 93662 INTRACARDIAC ECG (ICE): CPT | Performed by: INTERNAL MEDICINE

## 2021-01-07 PROCEDURE — C1732 CATH, EP, DIAG/ABL, 3D/VECT: HCPCS

## 2021-01-07 PROCEDURE — 93613 INTRACARDIAC EPHYS 3D MAPG: CPT

## 2021-01-07 PROCEDURE — C1894 INTRO/SHEATH, NON-LASER: HCPCS

## 2021-01-07 PROCEDURE — 74011250636 HC RX REV CODE- 250/636: Performed by: NURSE ANESTHETIST, CERTIFIED REGISTERED

## 2021-01-07 PROCEDURE — 74011250636 HC RX REV CODE- 250/636: Performed by: INTERNAL MEDICINE

## 2021-01-07 PROCEDURE — 83735 ASSAY OF MAGNESIUM: CPT

## 2021-01-07 PROCEDURE — 76060000034 HC ANESTHESIA 1.5 TO 2 HR: Performed by: INTERNAL MEDICINE

## 2021-01-07 PROCEDURE — 93005 ELECTROCARDIOGRAM TRACING: CPT | Performed by: INTERNAL MEDICINE

## 2021-01-07 PROCEDURE — 80048 BASIC METABOLIC PNL TOTAL CA: CPT

## 2021-01-07 PROCEDURE — 85610 PROTHROMBIN TIME: CPT

## 2021-01-07 PROCEDURE — 74011000250 HC RX REV CODE- 250: Performed by: NURSE ANESTHETIST, CERTIFIED REGISTERED

## 2021-01-07 PROCEDURE — 93613 INTRACARDIAC EPHYS 3D MAPG: CPT | Performed by: INTERNAL MEDICINE

## 2021-01-07 PROCEDURE — 93653 COMPRE EP EVAL TX SVT: CPT

## 2021-01-07 PROCEDURE — 77030013687 HC GD NDL BARD -B

## 2021-01-07 PROCEDURE — 77030027107 HC PTCH EXT REF CARTO3 J&J -F

## 2021-01-07 PROCEDURE — 93623 PRGRMD STIMJ&PACG IV RX NFS: CPT | Performed by: INTERNAL MEDICINE

## 2021-01-07 PROCEDURE — 77030002912 HC SUT ETHBND J&J -A

## 2021-01-07 PROCEDURE — 93621 COMP EP EVL L PAC&REC C SINS: CPT

## 2021-01-07 PROCEDURE — 93621 COMP EP EVL L PAC&REC C SINS: CPT | Performed by: INTERNAL MEDICINE

## 2021-01-07 RX ORDER — PROPOFOL 10 MG/ML
INJECTION, EMULSION INTRAVENOUS AS NEEDED
Status: DISCONTINUED | OUTPATIENT
Start: 2021-01-07 | End: 2021-01-07 | Stop reason: HOSPADM

## 2021-01-07 RX ORDER — PROPOFOL 10 MG/ML
INJECTION, EMULSION INTRAVENOUS
Status: DISCONTINUED | OUTPATIENT
Start: 2021-01-07 | End: 2021-01-07 | Stop reason: HOSPADM

## 2021-01-07 RX ORDER — CEFAZOLIN SODIUM/WATER 2 G/20 ML
SYRINGE (ML) INTRAVENOUS AS NEEDED
Status: DISCONTINUED | OUTPATIENT
Start: 2021-01-07 | End: 2021-01-07 | Stop reason: HOSPADM

## 2021-01-07 RX ORDER — HEPARIN SODIUM 200 [USP'U]/100ML
2000 INJECTION, SOLUTION INTRAVENOUS AS NEEDED
Status: DISCONTINUED | OUTPATIENT
Start: 2021-01-07 | End: 2021-01-07 | Stop reason: HOSPADM

## 2021-01-07 RX ORDER — FENTANYL CITRATE 50 UG/ML
INJECTION, SOLUTION INTRAMUSCULAR; INTRAVENOUS AS NEEDED
Status: DISCONTINUED | OUTPATIENT
Start: 2021-01-07 | End: 2021-01-07 | Stop reason: HOSPADM

## 2021-01-07 RX ORDER — SODIUM CHLORIDE 0.9 % (FLUSH) 0.9 %
5 SYRINGE (ML) INJECTION AS NEEDED
Status: DISCONTINUED | OUTPATIENT
Start: 2021-01-07 | End: 2021-01-07 | Stop reason: HOSPADM

## 2021-01-07 RX ORDER — SODIUM CHLORIDE, SODIUM LACTATE, POTASSIUM CHLORIDE, CALCIUM CHLORIDE 600; 310; 30; 20 MG/100ML; MG/100ML; MG/100ML; MG/100ML
INJECTION, SOLUTION INTRAVENOUS
Status: DISCONTINUED | OUTPATIENT
Start: 2021-01-07 | End: 2021-01-07 | Stop reason: HOSPADM

## 2021-01-07 RX ORDER — EPHEDRINE SULFATE/0.9% NACL/PF 50 MG/5 ML
SYRINGE (ML) INTRAVENOUS AS NEEDED
Status: DISCONTINUED | OUTPATIENT
Start: 2021-01-07 | End: 2021-01-07 | Stop reason: HOSPADM

## 2021-01-07 RX ORDER — LIDOCAINE HYDROCHLORIDE 10 MG/ML
20 INJECTION INFILTRATION; PERINEURAL ONCE
Status: COMPLETED | OUTPATIENT
Start: 2021-01-07 | End: 2021-01-07

## 2021-01-07 RX ADMIN — HEPARIN SODIUM 4000 UNITS: 200 INJECTION, SOLUTION INTRAVENOUS at 09:49

## 2021-01-07 RX ADMIN — LIDOCAINE HYDROCHLORIDE 20 ML: 10 INJECTION, SOLUTION INFILTRATION; PERINEURAL at 09:49

## 2021-01-07 RX ADMIN — PROPOFOL 140 MCG/KG/MIN: 10 INJECTION, EMULSION INTRAVENOUS at 09:43

## 2021-01-07 RX ADMIN — Medication 5 MG: at 10:06

## 2021-01-07 RX ADMIN — FENTANYL CITRATE 50 MCG: 50 INJECTION INTRAMUSCULAR; INTRAVENOUS at 09:40

## 2021-01-07 RX ADMIN — FENTANYL CITRATE 25 MCG: 50 INJECTION INTRAMUSCULAR; INTRAVENOUS at 10:44

## 2021-01-07 RX ADMIN — PROPOFOL 20 MG: 10 INJECTION, EMULSION INTRAVENOUS at 10:34

## 2021-01-07 RX ADMIN — PHENYLEPHRINE HYDROCHLORIDE 120 MCG: 10 INJECTION INTRAVENOUS at 10:24

## 2021-01-07 RX ADMIN — PROPOFOL 20 MG: 10 INJECTION, EMULSION INTRAVENOUS at 10:33

## 2021-01-07 RX ADMIN — PROPOFOL 50 MG: 10 INJECTION, EMULSION INTRAVENOUS at 09:43

## 2021-01-07 RX ADMIN — PHENYLEPHRINE HYDROCHLORIDE 120 MCG: 10 INJECTION INTRAVENOUS at 10:22

## 2021-01-07 RX ADMIN — Medication 5 MG: at 10:09

## 2021-01-07 RX ADMIN — FENTANYL CITRATE 25 MCG: 50 INJECTION INTRAMUSCULAR; INTRAVENOUS at 10:40

## 2021-01-07 RX ADMIN — PROPOFOL 30 MG: 10 INJECTION, EMULSION INTRAVENOUS at 09:49

## 2021-01-07 RX ADMIN — FENTANYL CITRATE 50 MCG: 50 INJECTION INTRAMUSCULAR; INTRAVENOUS at 10:55

## 2021-01-07 RX ADMIN — ISOPROTERENOL HYDROCHLORIDE 1 MCG/MIN: 0.2 INJECTION, SOLUTION INTRAMUSCULAR; INTRAVENOUS at 10:15

## 2021-01-07 RX ADMIN — FENTANYL CITRATE 25 MCG: 50 INJECTION INTRAMUSCULAR; INTRAVENOUS at 09:58

## 2021-01-07 RX ADMIN — FENTANYL CITRATE 25 MCG: 50 INJECTION INTRAMUSCULAR; INTRAVENOUS at 09:53

## 2021-01-07 RX ADMIN — Medication 2 G: at 09:45

## 2021-01-07 RX ADMIN — SODIUM CHLORIDE, SODIUM LACTATE, POTASSIUM CHLORIDE, AND CALCIUM CHLORIDE: 600; 310; 30; 20 INJECTION, SOLUTION INTRAVENOUS at 09:38

## 2021-01-07 NOTE — PROGRESS NOTES
's pre-procedure visit requested by patient. Conveyed care and concern for patient and family. Offered prayer as requested for patient, family, and staff.     Eveline Jordan MDiv, BS  Board Certified

## 2021-01-07 NOTE — PROGRESS NOTES
Patient received to [address] [place] [address] room # 9  Ambulatory from [facility]. Patient scheduled for EPS today with Dr Mandie Mclaughlin. Procedure reviewed & questions answered, voiced good understanding consent obtained & placed on chart. All medications and medical history reviewed. Will prep patient per orders. Patient & family updated on plan of care. The patient has a fraility score of 3-MANAGING WELL, based on ability to perform ADLS by self.

## 2021-01-07 NOTE — ANESTHESIA PREPROCEDURE EVALUATION
Anesthetic History     Other anesthesia complications     Comments: Elevated LFTs after a GA in past     Review of Systems / Medical History  Patient summary reviewed and pertinent labs reviewed    Pulmonary  Within defined limits                 Neuro/Psych       CVA (r hand numbness)  Psychiatric history     Cardiovascular    Hypertension: well controlled        Dysrhythmias (Wide Complex Tachycardia noted on loop reorder)       Exercise tolerance: >4 METS  Comments: MADELEINE 12/2020 - normal LVEF and no significant valvular abnormalities   GI/Hepatic/Renal  Within defined limits              Endo/Other        Arthritis     Other Findings            Physical Exam    Airway  Mallampati: II  TM Distance: 4 - 6 cm  Neck ROM: normal range of motion   Mouth opening: Normal     Cardiovascular  Regular rate and rhythm,  S1 and S2 normal,  no murmur, click, rub, or gallop             Dental  No notable dental hx       Pulmonary  Breath sounds clear to auscultation               Abdominal         Other Findings            Anesthetic Plan    ASA: 3  Anesthesia type: total IV anesthesia          Induction: Intravenous  Anesthetic plan and risks discussed with: Patient

## 2021-01-07 NOTE — PROGRESS NOTES
Pt ambulated in hallway to bathroom. Right groin site without bleeding, hematoma, or swelling. Discharge and follow up reviewed with pt and family at this time. No questions. Pt wheeled to door via wheelchair.

## 2021-01-07 NOTE — DISCHARGE INSTRUCTIONS
HEART ABLATION DISCHARGE INSTRUCTIONS    1. Check puncture site frequently for swelling or bleeding. If there is any bleeding, lie down and apply pressure over the area with a clean towel or washcloth and call 911. Notify your doctor for any redness, swelling, drainage, or oozing from the puncture site. Notify your doctor for any fever or chills. 2. If the extremity becomes cold, numb, or painful call [facility] at [phone].  3. Activity should be limited for the next 48 hours. Climb stairs as little as possible and avoid any stooping, bending, or strenuous activity for 48 hours. No heavy lifting (anything over 10 pounds) for 3 days. 4. You may resume your usual diet. Drink more fluids than usual.  5. Have a responsible person drive you home and stay with you for at least 24 hours after your heart catheterization/angiography. No driving for 24 hours. 6. You may remove bandage from your GROIN in 24 hours. You may shower in 24 hours. No tub baths, hot tubs, or swimming for 1 week. Do not place any lotions, creams, powders, or ointments over puncture site for 1 week. You may place a clean band-aid over the puncture site each day for 5 days. Change daily. 7. Continue all medications as prescribed. I have read the above instructions and have had the opportunity to ask questions.       Patient: ________________________   Date: 1/7/2021    Witness: _______________________   Date: 1/7/2021

## 2021-01-07 NOTE — ANESTHESIA POSTPROCEDURE EVALUATION
Procedure(s):  EP STUDY / SVT  ABLATION. total IV anesthesia    Anesthesia Post Evaluation      Multimodal analgesia: multimodal analgesia used between 6 hours prior to anesthesia start to PACU discharge  Patient location during evaluation: PACU  Patient participation: complete - patient participated  Level of consciousness: awake  Pain management: adequate  Airway patency: patent  Anesthetic complications: no  Cardiovascular status: acceptable  Respiratory status: spontaneous ventilation and acceptable  Hydration status: acceptable  Post anesthesia nausea and vomiting:  none      INITIAL Post-op Vital signs:   Vitals Value Taken Time   /80 01/07/21 1142   Temp 36 °C (96.8 °F) 01/07/21 1111   Pulse 69 01/07/21 1147   Resp 14 01/07/21 1111   SpO2 97 % 01/07/21 1147   Vitals shown include unvalidated device data.

## 2021-01-07 NOTE — PROCEDURES
Pre-Electrophysiology Diagnosis  1. Wide complex tachycardia     Procedure Performed  1. Electrophysiology testing with right-sided atrial flutter ablation. 2. Left atrial pacing recording from the coronary sinus. 3. 3-D Electroanatomical mapping  4. Progressive stimulation after isuprel infusion  5. Intracardiac echo     Anesthesia: MAC     Estimated Blood Loss: Less than 10 mL     Specimens: * No specimens in log *    Procedure in Detail:  The patient was brought to the electrophysiology lab in the fasting state. A Ref-Star CARTO patch was placed, the patient was then prepped and draped in sterile fashion. Venous access was then obtained x3 using modified Seldinger technique under ultrasound guidance, with placement of 3 short sidearm sheaths into the right femoral vein. A 3.5 mm CloudMine porous irrigated [employer] ablation catheter was inserted into one of the 8F sheaths and was used to create an electroanatomical map of the right atrium focusing on the cavotricuspid isthmus and into the coronary sinus. The ablation catheter was used to record intracardiac electrograms along the lateral wall of the right atrium and the His electrogram.  A multipolar catheter was then inserted via an 8Fr sheath and positioned in the mid coronary sinus. An 8Fr [employer] CARTOSound ICE catheter was then inserted and used to create sound anatomy of the RA and RV and used to visualize tissue contact during ablation. The patient presented to the EP lab in normal sinus rhythm, with a ILR revealing a very rapid WCT. Pt underwent an EP stud including assessment of AV conduction with CS pacing, VA conduction with right ventricular pacing, baseline measurements and His localization. At baseline, the patient did not have a jump with atrial PES, no cross over to the slow pathway with 1:1 decremental AV conduction, and had no reproducible atrial echo beats.   Retrograde conduction was concentric and decremental. Ventricular stimulation was performed at baseline and during isuprel infusion in attempts to induce VT which were unsuccessful. The patient was placed on isuprel 3mcg prior to ablation. In addition, the patient has very few PVCs during the EPS on or off isuprel and were polymorphic. The patient has had a prior crytogenic CVA, had multiple PACs during the procedure, and an organized atrial flutter was induced that degenerated into afib prior to entrainment. As discussed with the patient prior to his procedure, the decision was made to ablation RA flutter substrate. Linear ablation across the cavo-tricuspid isthmus was performed starting with 1:2 A:V EGMs along the isthmus at 6pm Kazakh to obtain bidirectional block. The local electrogram activation sequence, differential pacing maneuvers and electrogram timing was used to demonstrate bidirectional block along the cavotricuspid isthmus with further ablation. Trans-isthmus time post ablation: 138 msec    The coronary sinus multipolar catheter was used to pace the left atrium during the EP study. The LA CS electrograms were documented and interpreted during the procedure. Baseline Intervals    QRS duration: 93 msec  NJ interval: 131 msec  RR interval: 772 msec  AH interval: 58 msec  HV interval: 41 msec    EP Study    AV Wenchebach: 400 msec  AV hector ERP: 300 msec  VA Wenchebach: 310 msec    V stim: 600/220/210/200  V stim on isuprel: 400/220/210/200    At the completion of the final comprehensive EP study, all catheters were removed, and sheaths pulled. The patient tolerated the procedure well with no acute complications recognized. Post procedure ICE was negative for effusion. Complications: None    Summary:   1. Successful ablation of clockwise RA flutter by creation of a line of bidirectional block at the cavotricuspid isthmus. 2.         Comprehensive EP study with induction of RA flutter and atrial fibrillation.   3. Negative V stim at baseline and on isuprel infusion. 4. Pt tolerated the procedure well. 5. Family updated. Sharlene Martínez MD, MS  Clinical Cardiac Electrophysiology  1/7/2021  10:54 AM

## 2021-03-02 ENCOUNTER — APPOINTMENT (OUTPATIENT)
Dept: PHYSICAL THERAPY | Age: 71
End: 2021-03-02

## 2021-03-08 NOTE — H&P
[id number] [facility]  History and Physical Exam    Patient ID:  Addison Cifuentes  [id number]    33 y.o.  1950    Today: March 8, 2021    Vitals Signs: Reviewed as noted in medical record. Allergies: No Known Allergies    CC: Left hip pain    HPI:  Pt complains of left hip pain and difficulty ambulating. Relevant PMH:   Past Medical History:   Diagnosis Date    Actinic keratosis     Adverse effect of anesthesia     liver enzymes increased for 1 week following general anesthesia /rotator cuff repair 2011-    Arrhythmia     CVA (cerebral vascular accident) ([address]     10/23/18 numbness remains in R hand    Erectile dysfunction     Former smoker     Quit 2/2018--- 1 ppd x 40 yrs    Hypertension     managed with med    Osteoarthritis     feet--- surg for correction    Pain in joint involving pelvic region and thigh     Psychiatric disorder     ANIEXTY    Rosacea        Objective:                    HEENT: NC/AT                   Lungs:  clear                   Heart:   rrr                   Abdomen: soft                   Extremities:  Pain with rom of the left hip joint    Radiographs: reveal osteoarthritis with loss of joint space and bone spurs. Assessment: Unilateral osteoarthritis of hip, left [M16.12]    Plan:  Proceed with scheduled Procedure(s) (LRB):  LEFT HIP ARTHROPLASTY TOTAL DIRECT ANTERIOR APPROACH/ DEPUY (Left) . The patient has failed conservative treatment including NSAIDS, and injections. Due to the amount of pain the patient is experiencing we will proceed with scheduled procedure. It is also felt that the patient is high risk for postoperative complication due to history of multiple chronic medical problems.   The patient may potentially spend 2 nights in the hospital.      Signed By: VERENA Lepe  March 8, 2021

## 2021-03-17 ENCOUNTER — HOSPITAL ENCOUNTER (OUTPATIENT)
Dept: SURGERY | Age: 71
Discharge: HOME | End: 2021-03-17
Attending: ORTHOPAEDIC SURGERY
Payer: MEDICARE

## 2021-03-17 VITALS
DIASTOLIC BLOOD PRESSURE: 76 MMHG | SYSTOLIC BLOOD PRESSURE: 138 MMHG | WEIGHT: 174.2 LBS | TEMPERATURE: 98.4 F | OXYGEN SATURATION: 99 % | BODY MASS INDEX: 23.6 KG/M2 | RESPIRATION RATE: 14 BRPM | HEIGHT: 72 IN | HEART RATE: 65 BPM

## 2021-03-17 LAB
ANION GAP SERPL CALC-SCNC: 5 MMOL/L (ref 7–16)
APTT PPP: 31.1 SEC (ref 24.1–35.1)
BACTERIA SPEC CULT: NORMAL
BASOPHILS # BLD: 0.1 K/UL (ref 0–0.2)
BASOPHILS NFR BLD: 1 % (ref 0–2)
BUN SERPL-MCNC: 10 MG/DL (ref 8–23)
CALCIUM SERPL-MCNC: 9.9 MG/DL (ref 8.3–10.4)
CHLORIDE SERPL-SCNC: 102 MMOL/L (ref 98–107)
CO2 SERPL-SCNC: 31 MMOL/L (ref 21–32)
CREAT SERPL-MCNC: 0.74 MG/DL (ref 0.8–1.5)
DIFFERENTIAL METHOD BLD: ABNORMAL
EOSINOPHIL # BLD: 0.2 K/UL (ref 0–0.8)
EOSINOPHIL NFR BLD: 4 % (ref 0.5–7.8)
ERYTHROCYTE [DISTWIDTH] IN BLOOD BY AUTOMATED COUNT: 13 % (ref 11.9–14.6)
EST. AVERAGE GLUCOSE BLD GHB EST-MCNC: 97 MG/DL
GLUCOSE SERPL-MCNC: 88 MG/DL (ref 65–100)
HBA1C MFR BLD: 5 % (ref 4.2–6.3)
HCT VFR BLD AUTO: 48.6 % (ref 41.1–50.3)
HGB BLD-MCNC: 16.1 G/DL (ref 13.6–17.2)
IMM GRANULOCYTES # BLD AUTO: 0 K/UL (ref 0–0.5)
IMM GRANULOCYTES NFR BLD AUTO: 0 % (ref 0–5)
INR PPP: 1.3
LYMPHOCYTES # BLD: 1.2 K/UL (ref 0.5–4.6)
LYMPHOCYTES NFR BLD: 19 % (ref 13–44)
MCH RBC QN AUTO: 32.7 PG (ref 26.1–32.9)
MCHC RBC AUTO-ENTMCNC: 33.1 G/DL (ref 31.4–35)
MCV RBC AUTO: 98.6 FL (ref 79.6–97.8)
MONOCYTES # BLD: 0.6 K/UL (ref 0.1–1.3)
MONOCYTES NFR BLD: 10 % (ref 4–12)
NEUTS SEG # BLD: 4.1 K/UL (ref 1.7–8.2)
NEUTS SEG NFR BLD: 66 % (ref 43–78)
NRBC # BLD: 0 K/UL (ref 0–0.2)
PLATELET # BLD AUTO: 234 K/UL (ref 150–450)
PMV BLD AUTO: 11 FL (ref 9.4–12.3)
POTASSIUM SERPL-SCNC: 4 MMOL/L (ref 3.5–5.1)
PROTHROMBIN TIME: 16.5 SEC (ref 12.5–14.7)
RBC # BLD AUTO: 4.93 M/UL (ref 4.23–5.6)
SERVICE CMNT-IMP: NORMAL
SODIUM SERPL-SCNC: 138 MMOL/L (ref 136–145)
WBC # BLD AUTO: 6.1 K/UL (ref 4.3–11.1)

## 2021-03-17 PROCEDURE — 80048 BASIC METABOLIC PNL TOTAL CA: CPT

## 2021-03-17 PROCEDURE — 87641 MR-STAPH DNA AMP PROBE: CPT

## 2021-03-17 PROCEDURE — 83036 HEMOGLOBIN GLYCOSYLATED A1C: CPT

## 2021-03-17 PROCEDURE — 36415 COLL VENOUS BLD VENIPUNCTURE: CPT

## 2021-03-17 PROCEDURE — 85730 THROMBOPLASTIN TIME PARTIAL: CPT

## 2021-03-17 PROCEDURE — 85025 COMPLETE CBC W/AUTO DIFF WBC: CPT

## 2021-03-17 PROCEDURE — 85610 PROTHROMBIN TIME: CPT

## 2021-03-17 NOTE — PERIOP NOTES
Patient verified name and . Order for consent found in EHR; patient verified. Type 3 surgery, joint PAT walk-in assessment complete. Labs per surgeon: CBC, BMP, PT/PTT, hgba1c- results pending  Labs per anesthesia protocol: no additional  EKG: done 21 and 21- tracings in EMR and results within anesthesia guidelines    Pt had joint camp 20 and R hip replacement 9/3/20. Pt is a patient of Dr Kim Hdz at [facility] and has LINQ ILR that was placed 19. According to pt and EMR, pt had episode(s) of wide complex tachycardia (atrial flutter pt pt) 12/10/20 and went to see Dr Morgan Larson at [facility] 20. Pt had A-Fib ablation 21 and pt reports ILR still in place. Pt reports no episodes of palpitations, chest pain/tightness, SOB, syncope or dizziness and reports \"doing great. \" Last cardiology office note and EKG 21, TTE 12/10/20 found in EMR for anesthesia reference- will have anesthesia review pt's chart due to cardiac symptoms/interventions within last 90 days. Pt takes xarelto daily- sent medication hold request to [facility] and asked if cardiologist would like pt to take ASA 81mg while off xarelto- pt aware of communication request and understands PAT charge RN will follow-up and will call pt with cardiologist instructions. Patient COVID test date was today- pending result in EMR. MRSA/MSSA swab collected; pharmacy to review and dose antibiotic as appropriate. Hospital approved surgical skin cleanser and instructions to return bottle on DOS given per hospital policy. Patient provided with handouts including Guide to Surgery, Pain Management, Hand Hygiene, Blood Transfusion Education, and [facility] Brochure. Patient answered medical/surgical history questions at their best of ability. All prior to admission medications documented in [facility]. Original medication prescription bottles NOT visualized during patient appointment. Patient instructed to hold all vitamins 3 weeks prior to surgery and NSAIDS 5 days prior to surgery. Patient teach back successful and patient demonstrates knowledge of instruction.

## 2021-03-17 NOTE — PROGRESS NOTES
Brief review of relationship of heart arrythmias had with pt. HST had been ordered last Joint camp visit. Pt has had new onset wide complex tachycardia and has seen Cardiologist since previous Joint camp on 8/18/2020. Violet Hammond Pt states he will  Follow up with HST.

## 2021-03-17 NOTE — ADVANCED PRACTICE NURSE
Total Joint Surgery Preoperative Chart Review      Patient ID:  April Hinkle  [id number]  17 y.o.  1950  Surgeon: Dr. Mendy Cruz  Date of Surgery: 3/23/2021  Procedure: Total Left Hip Arthroplasty  Primary Care Physician: Samuel, Not On File None  Specialty Physician(s):      Subjective:   April Hinkle is a 78 y.o. WHITE male who presents for preoperative evaluation for Total Left Hip arthroplasty. This is a preoperative chart review note based on data collected by the nurse at the surgical Pre-Assessment visit.     Past Medical History:   Diagnosis Date    Actinic keratosis     Adverse effect of anesthesia     liver enzymes increased for 1 week following general anesthesia for shoulder sx    Anxiety     Atrial flutter ([address]     s/p ablation 1/7/21    CVA (cerebral vascular accident) ([address] 10/23/2018    numbness remains in R hand    Erectile dysfunction     Former smoker     Quit 2/2018--- 1 ppd x 40 yrs    Hypertension     managed with med    Osteoarthritis     feet--- surg for correction    Pain in joint involving pelvic region and thigh     Rosacea     Wide-complex tachycardia ([address] 01/2021    pt has ILR in place and is followed by Dr Juana Cabrales ([facility])       Past Surgical History:   Procedure Laterality Date    HX AFIB ABLATION  01/07/2021    pt has loop recorder    HX COLONOSCOPY      HX HIP REPLACEMENT Right 09/03/2020    HX ORTHOPAEDIC Right 03/09/2018    RIGHT FOOT 1ST MTP CHEILECTOMY     HX ORTHOPAEDIC Left 03/2019    elbow surg    HX ROTATOR CUFF REPAIR Left 2013    HX SHOULDER ARTHROSCOPY Right 2011    bicep    [place] ANESTH,SURGERY OF SHOULDER       Family History   Problem Relation Age of Onset    Heart Disease Mother     Heart Disease Father     Heart Attack Brother         before age 61    Hypertension Brother     Heart Disease Brother       Social History     Tobacco Use    Smoking status: Former Smoker     Packs/day: 1.00     Years: 40.00     Pack years: 40.00     Quit date: 2/21/2018     Years since quitting: 3.0    Smokeless tobacco: Never Used   Substance Use Topics    Alcohol use: Yes     Alcohol/week: 2.0 standard drinks     Types: 2 Glasses of wine per week       Prior to Admission medications    Medication Sig Start Date End Date Taking? Authorizing Provider   rivaroxaban (XARELTO) 20 mg tab tablet Take 1 Tab by mouth daily (with dinner). 1/15/21  Yes Hattie Cota MD   metoprolol succinate (TOPROL-XL) 50 mg XL tablet Take 1 Tab by mouth daily. Patient taking differently: Take 25 mg by mouth every evening. 1/15/21  Yes Hattie Cota MD   losartan (COZAAR) 50 mg tablet Take 1 Tab by mouth nightly. Indications: high blood pressure  Patient taking differently: Take 50 mg by mouth daily (with dinner). Indications: high blood pressure 1/13/21  Yes Janet Machado MD   ergocalciferol (ERGOCALCIFEROL) 1,250 mcg (50,000 unit) capsule Day 1 and 15 of each month 1/13/21  Yes Devika Augustine MD   ALPRAZolam Jurline Save) 2 mg tablet Take 0.5-1 Tabs by mouth two (2) times daily as needed for Anxiety. Max Daily Amount: 4 mg. Indications: anxiety  Patient taking differently: Take 2 mg by mouth as needed for Anxiety. Indications: anxious 8/6/18  Yes Mark Garcia MD   melatonin 3 mg tablet Take 3 mg by mouth nightly.    Yes Provider, Historical   tadalafiL (CIALIS) 20 mg tablet TAKE 1 TABLET AS NEEDED FOR ERECTILE DYSFUNCTION 1/13/21   Janet Machado MD     No Known Allergies       Objective:     Physical Exam:   Patient Vitals for the past 24 hrs:   Temp Pulse Resp BP SpO2   03/17/21 1033 98.4 °F (36.9 °C) 65 14 138/76 99 %       ECG:    EKG Results     None          Data Review:   Labs:   Recent Results (from the past 24 hour(s))   CBC WITH AUTOMATED DIFF    Collection Time: 03/17/21 10:21 AM   Result Value Ref Range    WBC 6.1 4.3 - 11.1 K/uL    RBC 4.93 4.23 - 5.6 M/uL    HGB 16.1 13.6 - 17.2 g/dL    HCT 48.6 41.1 - 50.3 %    MCV 98.6 (H) 79.6 - 97.8 FL    MCH 32.7 26.1 - 32.9 PG    MCHC 33.1 31.4 - 35.0 g/dL    RDW 13.0 11.9 - 14.6 %    PLATELET 994 884 - 548 K/uL    MPV 11.0 9.4 - 12.3 FL    ABSOLUTE NRBC 0.00 0.0 - 0.2 K/uL    DF AUTOMATED      NEUTROPHILS 66 43 - 78 %    LYMPHOCYTES 19 13 - 44 %    MONOCYTES 10 4.0 - 12.0 %    EOSINOPHILS 4 0.5 - 7.8 %    BASOPHILS 1 0.0 - 2.0 %    IMMATURE GRANULOCYTES 0 0.0 - 5.0 %    ABS. NEUTROPHILS 4.1 1.7 - 8.2 K/UL    ABS. LYMPHOCYTES 1.2 0.5 - 4.6 K/UL    ABS. MONOCYTES 0.6 0.1 - 1.3 K/UL    ABS. EOSINOPHILS 0.2 0.0 - 0.8 K/UL    ABS. BASOPHILS 0.1 0.0 - 0.2 K/UL    ABS. IMM. GRANS. 0.0 0.0 - 0.5 K/UL   PROTHROMBIN TIME + INR    Collection Time: 03/17/21 10:21 AM   Result Value Ref Range    Prothrombin time 16.5 (H) 12.5 - 14.7 sec    INR 1.3     PTT    Collection Time: 03/17/21 10:21 AM   Result Value Ref Range    aPTT 31.1 24.1 - 35.1 SEC     1/7/2021 BMP WNL for SX    Problem List:  )  Patient Active Problem List   Diagnosis Code    Anxiety F41.9    Erectile dysfunction N52.9    Colon polyp K63.5    Insomnia G47.00    Hypercholesteremia E78.00    Hypertension I10    History of stroke with residual deficit I69.30    Encounter for long-term current use of medication Z79.899    [address] hyperparathy[address] E21.0    Vitamin D deficiency E55.9    Osteopenia M85.80    Hyponatremia E87.1    Osteoarthritis M19.90    Snoring R06.83    Arthritis of right hip M16.11    Nocturia  R35.1    Elevated PSA R97.20    Wide-complex tachycardia (HCC) I47.2       Total Joint Surgery Pre-Assessment Recommendations:           Recommend continuous saturation monitoring hours of sleep, during hospitalization. O2 prn per respiratory protocol.        Signed By: KESHA Sauer    March 17, 2021

## 2021-03-17 NOTE — PERIOP NOTES
Lab results within anesthesia guidelines except for elevated PT- anesthesia to review per protocol. Hgba1c result pending- PAT charge RN to follow-up with result in AM.     MRSA swab result pending. Recent Results (from the past 12 hour(s))   CBC WITH AUTOMATED DIFF    Collection Time: 03/17/21 10:21 AM   Result Value Ref Range    WBC 6.1 4.3 - 11.1 K/uL    RBC 4.93 4.23 - 5.6 M/uL    HGB 16.1 13.6 - 17.2 g/dL    HCT 48.6 41.1 - 50.3 %    MCV 98.6 (H) 79.6 - 97.8 FL    MCH 32.7 26.1 - 32.9 PG    MCHC 33.1 31.4 - 35.0 g/dL    RDW 13.0 11.9 - 14.6 %    PLATELET 237 894 - 935 K/uL    MPV 11.0 9.4 - 12.3 FL    ABSOLUTE NRBC 0.00 0.0 - 0.2 K/uL    DF AUTOMATED      NEUTROPHILS 66 43 - 78 %    LYMPHOCYTES 19 13 - 44 %    MONOCYTES 10 4.0 - 12.0 %    EOSINOPHILS 4 0.5 - 7.8 %    BASOPHILS 1 0.0 - 2.0 %    IMMATURE GRANULOCYTES 0 0.0 - 5.0 %    ABS. NEUTROPHILS 4.1 1.7 - 8.2 K/UL    ABS. LYMPHOCYTES 1.2 0.5 - 4.6 K/UL    ABS. MONOCYTES 0.6 0.1 - 1.3 K/UL    ABS. EOSINOPHILS 0.2 0.0 - 0.8 K/UL    ABS. BASOPHILS 0.1 0.0 - 0.2 K/UL    ABS. IMM.  GRANS. 0.0 0.0 - 0.5 K/UL   METABOLIC PANEL, BASIC    Collection Time: 03/17/21 10:21 AM   Result Value Ref Range    Sodium 138 136 - 145 mmol/L    Potassium 4.0 3.5 - 5.1 mmol/L    Chloride 102 98 - 107 mmol/L    CO2 31 21 - 32 mmol/L    Anion gap 5 (L) 7 - 16 mmol/L    Glucose 88 65 - 100 mg/dL    BUN 10 8 - 23 MG/DL    Creatinine 0.74 (L) 0.8 - 1.5 MG/DL    GFR est AA >60 >60 ml/min/1.73m2    GFR est non-AA >60 >60 ml/min/1.73m2    Calcium 9.9 8.3 - 10.4 MG/DL   PROTHROMBIN TIME + INR    Collection Time: 03/17/21 10:21 AM   Result Value Ref Range    Prothrombin time 16.5 (H) 12.5 - 14.7 sec    INR 1.3     PTT    Collection Time: 03/17/21 10:21 AM   Result Value Ref Range    aPTT 31.1 24.1 - 35.1 SEC

## 2021-03-17 NOTE — PERIOP NOTES
[facility]           Ph.: [phone]           Fax: [phone]      Patient name: Shannan Linton     YOB: 1950      Surgical or Medication Clearance      Requesting Physician: Dr Charlette Valera (ortho) and [facility] : MICHAEL toney RN   Contact Ph #:[phone]   Contact fax # [phone]  Date of Surgery: 3/23/21  Type of surgery: left KASANDRA  Type of Anesthesia: spinal   Medication to be held: xarelto   Number of days requested to be held: 72 hours prior to spinal anesthesia/surgery     Would you like pt to start ASA 81mg while off xarelto? Thank you for your timely response!     Pre-Admission Testing

## 2021-03-17 NOTE — PERIOP NOTES
PLEASE CONTINUE TAKING ALL PRESCRIPTION MEDICATIONS UP TO THE DAY OF SURGERY UNLESS OTHERWISE DIRECTED BELOW. DISCONTINUE all vitamins and supplements 21 days prior to surgery. DISCONTINUE Non-Steriodal Anti-Inflammatory (NSAIDS) such as Advil and Aleve 5 days prior to surgery. Home Medications to take  the day of surgery   none           Home Medications   to Hold   Xarelto- hold for 72 hours prior to surgery (last dose Friday, 3/19)- have asked permission from Dr Skui Mcdonald and if he wants you to start Aspirin while off xarelto         Comments   BRING: melatonin in original bottle              Please do not bring home medications with you on the day of surgery unless otherwise directed by your nurse. If you are instructed to bring home medications, please give them to your nurse as they will be administered by the nursing staff. If you have any questions, please call [facility] [phone] or [facility] [phone]. A copy of this note was provided to the patient for reference.

## 2021-03-18 NOTE — PERIOP NOTES
Anesthesia () reviewed chart. OK for surgery.       HEMOGLOBIN A1C WITH EAG Elida Alemysesenia (Order [id number])  Lab  Date: 3/17/2021 Department: [facility] Pre Assessment Released By: Angie Kasper RN (auto-released) Authorizing: VERENA Li   Component Value Flag Ref Range Units Status   Hemoglobin A1c 5.0   4.20 - 6.30 % Final   Est. average glucose 97    mg/

## 2021-03-19 NOTE — PERIOP NOTES
Communication note faxed to Pre admission testing from [facility]-     Per Dr. Alvino Ochoa, ok for pt to hold Xarelto for 72hr and does not need to start ASA. Also, communication note in EMR:    Santhosh Zavala MD  to Clif Finley RN          3/19/21 11:28 AM  OK to hold as requested, does not need to take ASA       This RN called pt, informed of Dr. Alvino Ochoa' approval to hold Xarelto for 72 hr  (last dose today) and does not need to start ASA. Pt appreciative of phone call and instructions.

## 2021-03-19 NOTE — PERIOP NOTES
No update in EMR re: request to hold Xarelto. [facility] cardiology, spoke with Shadi. Will send request for 72 hr hold of Xarelto pre op due to Spinal anesthesia to Dr. Tiago Do. Will return call or fax note to Pre Admission Testing.

## 2021-03-22 ENCOUNTER — ANESTHESIA EVENT (OUTPATIENT)
Dept: SURGERY | Age: 71
DRG: 470 | End: 2021-03-22
Payer: MEDICARE

## 2021-03-23 ENCOUNTER — ANESTHESIA (OUTPATIENT)
Dept: SURGERY | Age: 71
DRG: 470 | End: 2021-03-23
Payer: MEDICARE

## 2021-03-23 ENCOUNTER — APPOINTMENT (OUTPATIENT)
Dept: GENERAL RADIOLOGY | Age: 71
DRG: 470 | End: 2021-03-23
Attending: PHYSICIAN ASSISTANT
Payer: MEDICARE

## 2021-03-23 ENCOUNTER — APPOINTMENT (OUTPATIENT)
Dept: GENERAL RADIOLOGY | Age: 71
DRG: 470 | End: 2021-03-23
Attending: ORTHOPAEDIC SURGERY
Payer: MEDICARE

## 2021-03-23 ENCOUNTER — HOSPITAL ENCOUNTER (INPATIENT)
Age: 71
LOS: 1 days | Discharge: HOME HEALTH | DRG: 470 | End: 2021-03-23
Attending: ORTHOPAEDIC SURGERY | Admitting: ORTHOPAEDIC SURGERY
Payer: MEDICARE

## 2021-03-23 VITALS
TEMPERATURE: 97.4 F | BODY MASS INDEX: 23.6 KG/M2 | HEIGHT: 72 IN | RESPIRATION RATE: 16 BRPM | DIASTOLIC BLOOD PRESSURE: 71 MMHG | OXYGEN SATURATION: 96 % | HEART RATE: 77 BPM | SYSTOLIC BLOOD PRESSURE: 104 MMHG | WEIGHT: 174.2 LBS

## 2021-03-23 DIAGNOSIS — M16.12 PRIMARY OSTEOARTHRITIS OF LEFT HIP: ICD-10-CM

## 2021-03-23 DIAGNOSIS — Z96.642 STATUS POST TOTAL HIP REPLACEMENT, LEFT: Primary | ICD-10-CM

## 2021-03-23 LAB
GLUCOSE BLD STRIP.AUTO-MCNC: 86 MG/DL (ref 65–100)
SERVICE CMNT-IMP: NORMAL

## 2021-03-23 PROCEDURE — 74011250636 HC RX REV CODE- 250/636: Performed by: ORTHOPAEDIC SURGERY

## 2021-03-23 PROCEDURE — 77030006835 HC BLD SAW SAG STRY -B: Performed by: ORTHOPAEDIC SURGERY

## 2021-03-23 PROCEDURE — 77030002933 HC SUT MCRYL J&J -A: Performed by: ORTHOPAEDIC SURGERY

## 2021-03-23 PROCEDURE — 27130 TOTAL HIP ARTHROPLASTY: CPT | Performed by: ORTHOPAEDIC SURGERY

## 2021-03-23 PROCEDURE — 74011250637 HC RX REV CODE- 250/637: Performed by: ANESTHESIOLOGY

## 2021-03-23 PROCEDURE — 74011000250 HC RX REV CODE- 250

## 2021-03-23 PROCEDURE — 27130 TOTAL HIP ARTHROPLASTY: CPT | Performed by: PHYSICIAN ASSISTANT

## 2021-03-23 PROCEDURE — 74011250636 HC RX REV CODE- 250/636: Performed by: ANESTHESIOLOGY

## 2021-03-23 PROCEDURE — 97530 THERAPEUTIC ACTIVITIES: CPT

## 2021-03-23 PROCEDURE — 76010000171 HC OR TIME 2 TO 2.5 HR INTENSV-TIER 1: Performed by: ORTHOPAEDIC SURGERY

## 2021-03-23 PROCEDURE — 97110 THERAPEUTIC EXERCISES: CPT

## 2021-03-23 PROCEDURE — C1776 JOINT DEVICE (IMPLANTABLE): HCPCS | Performed by: ORTHOPAEDIC SURGERY

## 2021-03-23 PROCEDURE — 77030019908 HC STETH ESOPH SIMS -A: Performed by: ANESTHESIOLOGY

## 2021-03-23 PROCEDURE — 77030034479 HC ADH SKN CLSR PRINEO J&J -B: Performed by: ORTHOPAEDIC SURGERY

## 2021-03-23 PROCEDURE — 77030019557 HC ELECTRD VES SEAL MEDT -F: Performed by: ORTHOPAEDIC SURGERY

## 2021-03-23 PROCEDURE — 74011250636 HC RX REV CODE- 250/636: Performed by: PHYSICIAN ASSISTANT

## 2021-03-23 PROCEDURE — 74011250636 HC RX REV CODE- 250/636

## 2021-03-23 PROCEDURE — 77030018547 HC SUT ETHBND1 J&J -B: Performed by: ORTHOPAEDIC SURGERY

## 2021-03-23 PROCEDURE — 85610 PROTHROMBIN TIME: CPT

## 2021-03-23 PROCEDURE — 76060000035 HC ANESTHESIA 2 TO 2.5 HR: Performed by: ORTHOPAEDIC SURGERY

## 2021-03-23 PROCEDURE — 77030031139 HC SUT VCRL2 J&J -A: Performed by: ORTHOPAEDIC SURGERY

## 2021-03-23 PROCEDURE — 74011250637 HC RX REV CODE- 250/637: Performed by: PHYSICIAN ASSISTANT

## 2021-03-23 PROCEDURE — 77030037088 HC TUBE ENDOTRACH ORAL NSL COVD-A: Performed by: ANESTHESIOLOGY

## 2021-03-23 PROCEDURE — 77030040922 HC BLNKT HYPOTHRM STRY -A: Performed by: ANESTHESIOLOGY

## 2021-03-23 PROCEDURE — 0SRB04A REPLACEMENT OF LEFT HIP JOINT WITH CERAMIC ON POLYETHYLENE SYNTHETIC SUBSTITUTE, UNCEMENTED, OPEN APPROACH: ICD-10-PCS | Performed by: ORTHOPAEDIC SURGERY

## 2021-03-23 PROCEDURE — 77030039425 HC BLD LARYNG TRULITE DISP TELE -A: Performed by: ANESTHESIOLOGY

## 2021-03-23 PROCEDURE — 97165 OT EVAL LOW COMPLEX 30 MIN: CPT

## 2021-03-23 PROCEDURE — 72170 X-RAY EXAM OF PELVIS: CPT

## 2021-03-23 PROCEDURE — 65270000029 HC RM PRIVATE

## 2021-03-23 PROCEDURE — 74011000250 HC RX REV CODE- 250: Performed by: PHYSICIAN ASSISTANT

## 2021-03-23 PROCEDURE — 76210000006 HC OR PH I REC 0.5 TO 1 HR: Performed by: ORTHOPAEDIC SURGERY

## 2021-03-23 PROCEDURE — 77030021678 HC GLIDESCP STAT DISP VERT -B: Performed by: ANESTHESIOLOGY

## 2021-03-23 PROCEDURE — 97535 SELF CARE MNGMENT TRAINING: CPT

## 2021-03-23 PROCEDURE — 94760 N-INVAS EAR/PLS OXIMETRY 1: CPT

## 2021-03-23 PROCEDURE — 73501 X-RAY EXAM HIP UNI 1 VIEW: CPT

## 2021-03-23 PROCEDURE — 82962 GLUCOSE BLOOD TEST: CPT

## 2021-03-23 PROCEDURE — 97161 PT EVAL LOW COMPLEX 20 MIN: CPT

## 2021-03-23 PROCEDURE — 2709999900 HC NON-CHARGEABLE SUPPLY: Performed by: ORTHOPAEDIC SURGERY

## 2021-03-23 DEVICE — STEM FEM SZ 7 HIP HI OFFSET CLLRD CEMENTLESS 12/14 TAPR: Type: IMPLANTABLE DEVICE | Site: HIP | Status: FUNCTIONAL

## 2021-03-23 DEVICE — HIP H1 TOT STD COCR HD IMPL CAPPED SYNTHES: Type: IMPLANTABLE DEVICE | Status: FUNCTIONAL

## 2021-03-23 DEVICE — CUP ACET DIA54MM 12 H HIP TI GRIPTION VIP TAPR DOME REV: Type: IMPLANTABLE DEVICE | Site: HIP | Status: FUNCTIONAL

## 2021-03-23 DEVICE — HEAD FEM DIA36MM +1.5MM OFFSET 12/14 TAPR HIP CERAMIC: Type: IMPLANTABLE DEVICE | Site: HIP | Status: FUNCTIONAL

## 2021-03-23 DEVICE — LINER ACET OD54MM ID36MM HIP ALTRX PINN: Type: IMPLANTABLE DEVICE | Site: HIP | Status: FUNCTIONAL

## 2021-03-23 DEVICE — SCREW BNE L30MM DIA6.5MM CANC HIP S STL GRIPTION FULL THRD: Type: IMPLANTABLE DEVICE | Site: HIP | Status: FUNCTIONAL

## 2021-03-23 RX ORDER — ALBUTEROL SULFATE 0.83 MG/ML
2.5 SOLUTION RESPIRATORY (INHALATION) AS NEEDED
Status: DISCONTINUED | OUTPATIENT
Start: 2021-03-23 | End: 2021-03-23 | Stop reason: HOSPADM

## 2021-03-23 RX ORDER — CELECOXIB 200 MG/1
200 CAPSULE ORAL EVERY 12 HOURS
Status: DISCONTINUED | OUTPATIENT
Start: 2021-03-23 | End: 2021-03-23 | Stop reason: HOSPADM

## 2021-03-23 RX ORDER — SODIUM CHLORIDE 9 MG/ML
100 INJECTION, SOLUTION INTRAVENOUS CONTINUOUS
Status: DISCONTINUED | OUTPATIENT
Start: 2021-03-23 | End: 2021-03-23 | Stop reason: HOSPADM

## 2021-03-23 RX ORDER — SODIUM CHLORIDE 0.9 % (FLUSH) 0.9 %
5-40 SYRINGE (ML) INJECTION EVERY 8 HOURS
Status: DISCONTINUED | OUTPATIENT
Start: 2021-03-23 | End: 2021-03-23 | Stop reason: HOSPADM

## 2021-03-23 RX ORDER — LABETALOL HYDROCHLORIDE 5 MG/ML
INJECTION, SOLUTION INTRAVENOUS AS NEEDED
Status: DISCONTINUED | OUTPATIENT
Start: 2021-03-23 | End: 2021-03-23 | Stop reason: HOSPADM

## 2021-03-23 RX ORDER — MIDAZOLAM HYDROCHLORIDE 1 MG/ML
2 INJECTION, SOLUTION INTRAMUSCULAR; INTRAVENOUS ONCE
Status: COMPLETED | OUTPATIENT
Start: 2021-03-23 | End: 2021-03-23

## 2021-03-23 RX ORDER — KETOROLAC TROMETHAMINE 30 MG/ML
INJECTION, SOLUTION INTRAMUSCULAR; INTRAVENOUS AS NEEDED
Status: DISCONTINUED | OUTPATIENT
Start: 2021-03-23 | End: 2021-03-23 | Stop reason: HOSPADM

## 2021-03-23 RX ORDER — GLYCOPYRROLATE 0.2 MG/ML
INJECTION INTRAMUSCULAR; INTRAVENOUS AS NEEDED
Status: DISCONTINUED | OUTPATIENT
Start: 2021-03-23 | End: 2021-03-23 | Stop reason: HOSPADM

## 2021-03-23 RX ORDER — HYDROMORPHONE HYDROCHLORIDE 1 MG/ML
1 INJECTION, SOLUTION INTRAMUSCULAR; INTRAVENOUS; SUBCUTANEOUS
Status: DISCONTINUED | OUTPATIENT
Start: 2021-03-23 | End: 2021-03-23 | Stop reason: HOSPADM

## 2021-03-23 RX ORDER — DEXAMETHASONE SODIUM PHOSPHATE 4 MG/ML
INJECTION, SOLUTION INTRA-ARTICULAR; INTRALESIONAL; INTRAMUSCULAR; INTRAVENOUS; SOFT TISSUE AS NEEDED
Status: DISCONTINUED | OUTPATIENT
Start: 2021-03-23 | End: 2021-03-23 | Stop reason: HOSPADM

## 2021-03-23 RX ORDER — CEFAZOLIN SODIUM/WATER 2 G/20 ML
2 SYRINGE (ML) INTRAVENOUS ONCE
Status: COMPLETED | OUTPATIENT
Start: 2021-03-23 | End: 2021-03-23

## 2021-03-23 RX ORDER — PROPOFOL 10 MG/ML
INJECTION, EMULSION INTRAVENOUS AS NEEDED
Status: DISCONTINUED | OUTPATIENT
Start: 2021-03-23 | End: 2021-03-23 | Stop reason: HOSPADM

## 2021-03-23 RX ORDER — TRANEXAMIC ACID 100 MG/ML
INJECTION, SOLUTION INTRAVENOUS AS NEEDED
Status: DISCONTINUED | OUTPATIENT
Start: 2021-03-23 | End: 2021-03-23 | Stop reason: HOSPADM

## 2021-03-23 RX ORDER — ASPIRIN 81 MG/1
81 TABLET ORAL EVERY 12 HOURS
Qty: 20 TAB | Refills: 0 | Status: SHIPPED | OUTPATIENT
Start: 2021-03-23 | End: 2021-04-02

## 2021-03-23 RX ORDER — HYDROMORPHONE HYDROCHLORIDE 2 MG/ML
0.5 INJECTION, SOLUTION INTRAMUSCULAR; INTRAVENOUS; SUBCUTANEOUS
Status: DISCONTINUED | OUTPATIENT
Start: 2021-03-23 | End: 2021-03-23 | Stop reason: HOSPADM

## 2021-03-23 RX ORDER — VANCOMYCIN HYDROCHLORIDE 1 G/20ML
INJECTION, POWDER, LYOPHILIZED, FOR SOLUTION INTRAVENOUS AS NEEDED
Status: DISCONTINUED | OUTPATIENT
Start: 2021-03-23 | End: 2021-03-23 | Stop reason: HOSPADM

## 2021-03-23 RX ORDER — METOPROLOL SUCCINATE 50 MG/1
25 TABLET, EXTENDED RELEASE ORAL EVERY EVENING
Status: DISCONTINUED | OUTPATIENT
Start: 2021-03-23 | End: 2021-03-23 | Stop reason: HOSPADM

## 2021-03-23 RX ORDER — NEOSTIGMINE METHYLSULFATE 1 MG/ML
INJECTION, SOLUTION INTRAVENOUS AS NEEDED
Status: DISCONTINUED | OUTPATIENT
Start: 2021-03-23 | End: 2021-03-23 | Stop reason: HOSPADM

## 2021-03-23 RX ORDER — LIDOCAINE HYDROCHLORIDE 10 MG/ML
0.1 INJECTION INFILTRATION; PERINEURAL AS NEEDED
Status: DISCONTINUED | OUTPATIENT
Start: 2021-03-23 | End: 2021-03-23 | Stop reason: HOSPADM

## 2021-03-23 RX ORDER — SODIUM CHLORIDE, SODIUM LACTATE, POTASSIUM CHLORIDE, CALCIUM CHLORIDE 600; 310; 30; 20 MG/100ML; MG/100ML; MG/100ML; MG/100ML
50 INJECTION, SOLUTION INTRAVENOUS CONTINUOUS
Status: DISCONTINUED | OUTPATIENT
Start: 2021-03-23 | End: 2021-03-23 | Stop reason: HOSPADM

## 2021-03-23 RX ORDER — EPHEDRINE SULFATE/0.9% NACL/PF 50 MG/5 ML
SYRINGE (ML) INTRAVENOUS AS NEEDED
Status: DISCONTINUED | OUTPATIENT
Start: 2021-03-23 | End: 2021-03-23 | Stop reason: HOSPADM

## 2021-03-23 RX ORDER — FENTANYL CITRATE 50 UG/ML
INJECTION, SOLUTION INTRAMUSCULAR; INTRAVENOUS AS NEEDED
Status: DISCONTINUED | OUTPATIENT
Start: 2021-03-23 | End: 2021-03-23 | Stop reason: HOSPADM

## 2021-03-23 RX ORDER — ALPRAZOLAM 0.5 MG/1
2 TABLET ORAL
Status: DISCONTINUED | OUTPATIENT
Start: 2021-03-23 | End: 2021-03-23 | Stop reason: HOSPADM

## 2021-03-23 RX ORDER — ASPIRIN 81 MG/1
81 TABLET ORAL EVERY 12 HOURS
Status: DISCONTINUED | OUTPATIENT
Start: 2021-03-23 | End: 2021-03-23 | Stop reason: HOSPADM

## 2021-03-23 RX ORDER — FENTANYL CITRATE 50 UG/ML
100 INJECTION, SOLUTION INTRAMUSCULAR; INTRAVENOUS ONCE
Status: DISCONTINUED | OUTPATIENT
Start: 2021-03-23 | End: 2021-03-23 | Stop reason: HOSPADM

## 2021-03-23 RX ORDER — ONDANSETRON 2 MG/ML
INJECTION INTRAMUSCULAR; INTRAVENOUS AS NEEDED
Status: DISCONTINUED | OUTPATIENT
Start: 2021-03-23 | End: 2021-03-23 | Stop reason: HOSPADM

## 2021-03-23 RX ORDER — ACETAMINOPHEN 500 MG
1000 TABLET ORAL ONCE
Status: COMPLETED | OUTPATIENT
Start: 2021-03-23 | End: 2021-03-23

## 2021-03-23 RX ORDER — AMOXICILLIN 250 MG
2 CAPSULE ORAL DAILY
Status: DISCONTINUED | OUTPATIENT
Start: 2021-03-24 | End: 2021-03-23 | Stop reason: HOSPADM

## 2021-03-23 RX ORDER — PROPOFOL 10 MG/ML
INJECTION, EMULSION INTRAVENOUS
Status: DISCONTINUED | OUTPATIENT
Start: 2021-03-23 | End: 2021-03-23 | Stop reason: HOSPADM

## 2021-03-23 RX ORDER — ROPIVACAINE HYDROCHLORIDE 2 MG/ML
INJECTION, SOLUTION EPIDURAL; INFILTRATION; PERINEURAL AS NEEDED
Status: DISCONTINUED | OUTPATIENT
Start: 2021-03-23 | End: 2021-03-23 | Stop reason: HOSPADM

## 2021-03-23 RX ORDER — HYDROCODONE BITARTRATE AND ACETAMINOPHEN 10; 325 MG/1; MG/1
1 TABLET ORAL
Qty: 30 TAB | Refills: 0 | Status: SHIPPED | OUTPATIENT
Start: 2021-03-23 | End: 2021-03-28

## 2021-03-23 RX ORDER — ACETAMINOPHEN 650 MG/1
650 SUPPOSITORY RECTAL ONCE
Status: DISCONTINUED | OUTPATIENT
Start: 2021-03-23 | End: 2021-03-23

## 2021-03-23 RX ORDER — SODIUM CHLORIDE 0.9 % (FLUSH) 0.9 %
5-40 SYRINGE (ML) INJECTION AS NEEDED
Status: DISCONTINUED | OUTPATIENT
Start: 2021-03-23 | End: 2021-03-23 | Stop reason: HOSPADM

## 2021-03-23 RX ORDER — LIDOCAINE HYDROCHLORIDE 20 MG/ML
INJECTION, SOLUTION EPIDURAL; INFILTRATION; INTRACAUDAL; PERINEURAL AS NEEDED
Status: DISCONTINUED | OUTPATIENT
Start: 2021-03-23 | End: 2021-03-23 | Stop reason: HOSPADM

## 2021-03-23 RX ORDER — ACETAMINOPHEN 325 MG/1
975 TABLET ORAL ONCE
Status: DISCONTINUED | OUTPATIENT
Start: 2021-03-23 | End: 2021-03-23

## 2021-03-23 RX ORDER — DIPHENHYDRAMINE HCL 25 MG
25 CAPSULE ORAL
Status: DISCONTINUED | OUTPATIENT
Start: 2021-03-23 | End: 2021-03-23 | Stop reason: HOSPADM

## 2021-03-23 RX ORDER — DEXAMETHASONE SODIUM PHOSPHATE 100 MG/10ML
10 INJECTION INTRAMUSCULAR; INTRAVENOUS ONCE
Status: DISCONTINUED | OUTPATIENT
Start: 2021-03-24 | End: 2021-03-23 | Stop reason: HOSPADM

## 2021-03-23 RX ORDER — CEFAZOLIN SODIUM/WATER 2 G/20 ML
2 SYRINGE (ML) INTRAVENOUS EVERY 8 HOURS
Status: DISCONTINUED | OUTPATIENT
Start: 2021-03-23 | End: 2021-03-23 | Stop reason: HOSPADM

## 2021-03-23 RX ORDER — ACETAMINOPHEN 500 MG
1000 TABLET ORAL EVERY 6 HOURS
Status: DISCONTINUED | OUTPATIENT
Start: 2021-03-23 | End: 2021-03-23 | Stop reason: HOSPADM

## 2021-03-23 RX ORDER — OXYCODONE HYDROCHLORIDE 5 MG/1
5 TABLET ORAL
Status: DISCONTINUED | OUTPATIENT
Start: 2021-03-23 | End: 2021-03-23 | Stop reason: HOSPADM

## 2021-03-23 RX ORDER — LOSARTAN POTASSIUM 50 MG/1
50 TABLET ORAL
Status: DISCONTINUED | OUTPATIENT
Start: 2021-03-23 | End: 2021-03-23 | Stop reason: HOSPADM

## 2021-03-23 RX ORDER — HYDROCODONE BITARTRATE AND ACETAMINOPHEN 10; 325 MG/1; MG/1
1 TABLET ORAL
Status: DISCONTINUED | OUTPATIENT
Start: 2021-03-23 | End: 2021-03-23 | Stop reason: HOSPADM

## 2021-03-23 RX ORDER — NALOXONE HYDROCHLORIDE 0.4 MG/ML
.2-.4 INJECTION, SOLUTION INTRAMUSCULAR; INTRAVENOUS; SUBCUTANEOUS
Status: DISCONTINUED | OUTPATIENT
Start: 2021-03-23 | End: 2021-03-23 | Stop reason: HOSPADM

## 2021-03-23 RX ORDER — MIDAZOLAM HYDROCHLORIDE 1 MG/ML
2 INJECTION, SOLUTION INTRAMUSCULAR; INTRAVENOUS
Status: DISCONTINUED | OUTPATIENT
Start: 2021-03-23 | End: 2021-03-23 | Stop reason: HOSPADM

## 2021-03-23 RX ORDER — ROCURONIUM BROMIDE 10 MG/ML
INJECTION, SOLUTION INTRAVENOUS AS NEEDED
Status: DISCONTINUED | OUTPATIENT
Start: 2021-03-23 | End: 2021-03-23 | Stop reason: HOSPADM

## 2021-03-23 RX ORDER — SODIUM CHLORIDE, SODIUM LACTATE, POTASSIUM CHLORIDE, CALCIUM CHLORIDE 600; 310; 30; 20 MG/100ML; MG/100ML; MG/100ML; MG/100ML
75 INJECTION, SOLUTION INTRAVENOUS CONTINUOUS
Status: DISCONTINUED | OUTPATIENT
Start: 2021-03-23 | End: 2021-03-23 | Stop reason: HOSPADM

## 2021-03-23 RX ORDER — KETAMINE HYDROCHLORIDE 50 MG/ML
INJECTION, SOLUTION INTRAMUSCULAR; INTRAVENOUS AS NEEDED
Status: DISCONTINUED | OUTPATIENT
Start: 2021-03-23 | End: 2021-03-23 | Stop reason: HOSPADM

## 2021-03-23 RX ADMIN — ROCURONIUM BROMIDE 50 MG: 10 INJECTION, SOLUTION INTRAVENOUS at 08:25

## 2021-03-23 RX ADMIN — HYDROCODONE BITARTRATE AND ACETAMINOPHEN 1 TABLET: 10; 325 TABLET ORAL at 18:04

## 2021-03-23 RX ADMIN — PROPOFOL 20 MG: 10 INJECTION, EMULSION INTRAVENOUS at 10:00

## 2021-03-23 RX ADMIN — GLYCOPYRROLATE 0.4 MG: 0.2 INJECTION, SOLUTION INTRAMUSCULAR; INTRAVENOUS at 09:59

## 2021-03-23 RX ADMIN — ACETAMINOPHEN 1000 MG: 500 TABLET, FILM COATED ORAL at 18:01

## 2021-03-23 RX ADMIN — CELECOXIB 200 MG: 200 CAPSULE ORAL at 12:28

## 2021-03-23 RX ADMIN — PROPOFOL 20 MG: 10 INJECTION, EMULSION INTRAVENOUS at 10:05

## 2021-03-23 RX ADMIN — FENTANYL CITRATE 25 MCG: 50 INJECTION INTRAMUSCULAR; INTRAVENOUS at 10:03

## 2021-03-23 RX ADMIN — LOSARTAN POTASSIUM 50 MG: 50 TABLET, FILM COATED ORAL at 16:27

## 2021-03-23 RX ADMIN — SODIUM CHLORIDE 100 ML/HR: 900 INJECTION, SOLUTION INTRAVENOUS at 12:25

## 2021-03-23 RX ADMIN — PHENYLEPHRINE HYDROCHLORIDE 100 MCG: 10 INJECTION INTRAVENOUS at 09:56

## 2021-03-23 RX ADMIN — PROPOFOL 20 MG: 10 INJECTION, EMULSION INTRAVENOUS at 08:53

## 2021-03-23 RX ADMIN — TRANEXAMIC ACID 1 G: 100 INJECTION, SOLUTION INTRAVENOUS at 08:45

## 2021-03-23 RX ADMIN — KETAMINE HYDROCHLORIDE 40 MG: 50 INJECTION INTRAMUSCULAR; INTRAVENOUS at 08:24

## 2021-03-23 RX ADMIN — SODIUM CHLORIDE, SODIUM LACTATE, POTASSIUM CHLORIDE, AND CALCIUM CHLORIDE 75 ML/HR: 600; 310; 30; 20 INJECTION, SOLUTION INTRAVENOUS at 06:55

## 2021-03-23 RX ADMIN — ACETAMINOPHEN 1000 MG: 500 TABLET, FILM COATED ORAL at 06:54

## 2021-03-23 RX ADMIN — FENTANYL CITRATE 100 MCG: 50 INJECTION INTRAMUSCULAR; INTRAVENOUS at 08:18

## 2021-03-23 RX ADMIN — Medication 10 MG: at 08:50

## 2021-03-23 RX ADMIN — FENTANYL CITRATE 25 MCG: 50 INJECTION INTRAMUSCULAR; INTRAVENOUS at 08:43

## 2021-03-23 RX ADMIN — PROPOFOL 20 MG: 10 INJECTION, EMULSION INTRAVENOUS at 08:35

## 2021-03-23 RX ADMIN — PROPOFOL 20 MG: 10 INJECTION, EMULSION INTRAVENOUS at 09:05

## 2021-03-23 RX ADMIN — PROPOFOL 120 MCG/KG/MIN: 10 INJECTION, EMULSION INTRAVENOUS at 08:24

## 2021-03-23 RX ADMIN — PROPOFOL 20 MG: 10 INJECTION, EMULSION INTRAVENOUS at 09:13

## 2021-03-23 RX ADMIN — TRANEXAMIC ACID 1 G: 100 INJECTION, SOLUTION INTRAVENOUS at 10:04

## 2021-03-23 RX ADMIN — PROPOFOL 150 MG: 10 INJECTION, EMULSION INTRAVENOUS at 08:24

## 2021-03-23 RX ADMIN — LABETALOL HYDROCHLORIDE 10 MG: 5 INJECTION INTRAVENOUS at 09:18

## 2021-03-23 RX ADMIN — DEXAMETHASONE SODIUM PHOSPHATE 10 MG: 4 INJECTION, SOLUTION INTRAMUSCULAR; INTRAVENOUS at 08:47

## 2021-03-23 RX ADMIN — Medication 1 AMPULE: at 12:28

## 2021-03-23 RX ADMIN — ROCURONIUM BROMIDE 20 MG: 10 INJECTION, SOLUTION INTRAVENOUS at 09:12

## 2021-03-23 RX ADMIN — CEFAZOLIN 2 G: 1 INJECTION, POWDER, FOR SOLUTION INTRAVENOUS at 08:16

## 2021-03-23 RX ADMIN — METOPROLOL SUCCINATE 25 MG: 50 TABLET, EXTENDED RELEASE ORAL at 18:01

## 2021-03-23 RX ADMIN — KETAMINE HYDROCHLORIDE 20 MG: 50 INJECTION INTRAMUSCULAR; INTRAVENOUS at 09:13

## 2021-03-23 RX ADMIN — FENTANYL CITRATE 50 MCG: 50 INJECTION INTRAMUSCULAR; INTRAVENOUS at 08:56

## 2021-03-23 RX ADMIN — FENTANYL CITRATE 25 MCG: 50 INJECTION INTRAMUSCULAR; INTRAVENOUS at 08:53

## 2021-03-23 RX ADMIN — LIDOCAINE HYDROCHLORIDE 80 MG: 20 INJECTION, SOLUTION EPIDURAL; INFILTRATION; INTRACAUDAL; PERINEURAL at 08:24

## 2021-03-23 RX ADMIN — Medication 3 AMPULE: at 06:54

## 2021-03-23 RX ADMIN — PROPOFOL 40 MG: 10 INJECTION, EMULSION INTRAVENOUS at 09:19

## 2021-03-23 RX ADMIN — PHENYLEPHRINE HYDROCHLORIDE 100 MCG: 10 INJECTION INTRAVENOUS at 08:38

## 2021-03-23 RX ADMIN — ACETAMINOPHEN 1000 MG: 500 TABLET, FILM COATED ORAL at 12:28

## 2021-03-23 RX ADMIN — ONDANSETRON 4 MG: 2 INJECTION INTRAMUSCULAR; INTRAVENOUS at 08:47

## 2021-03-23 RX ADMIN — Medication 10 ML: at 16:28

## 2021-03-23 RX ADMIN — LABETALOL HYDROCHLORIDE 10 MG: 5 INJECTION INTRAVENOUS at 09:25

## 2021-03-23 RX ADMIN — PROPOFOL 50 MG: 10 INJECTION, EMULSION INTRAVENOUS at 08:29

## 2021-03-23 RX ADMIN — HYDROCODONE BITARTRATE AND ACETAMINOPHEN 1 TABLET: 10; 325 TABLET ORAL at 12:27

## 2021-03-23 RX ADMIN — CEFAZOLIN SODIUM 2 G: 100 INJECTION, POWDER, LYOPHILIZED, FOR SOLUTION INTRAVENOUS at 16:27

## 2021-03-23 RX ADMIN — Medication 3 MG: at 09:59

## 2021-03-23 RX ADMIN — MIDAZOLAM 2 MG: 1 INJECTION INTRAMUSCULAR; INTRAVENOUS at 07:50

## 2021-03-23 RX ADMIN — PHENYLEPHRINE HYDROCHLORIDE 100 MCG: 10 INJECTION INTRAVENOUS at 08:48

## 2021-03-23 NOTE — ROUTINE PROCESS
TRANSFER - IN REPORT: 
 
Verbal report received from SCOTT Nixon rn(name) on Juan Hickey  being received from [facility]) for routine progression of care Report consisted of patients Situation, Background, Assessment and  
Recommendations(SBAR). Information from the following report(s) Kardex, Procedure Summary, Intake/Output, MAR and Recent Results was reviewed with the receiving nurse. Opportunity for questions and clarification was provided. Assessment completed upon patients arrival to unit and care assumed.

## 2021-03-23 NOTE — PERIOP NOTES
Teach back method used in review of Hibiclens usage preop/postop, TB screening, pain management goals, falls precautions and use of Nozin for prevention of staph infections. Incentive spirometer-in car and knows to use 10x hourly postop.

## 2021-03-23 NOTE — PROGRESS NOTES
Problem: Mobility Impaired (Adult and Pediatric)  Goal: *Acute Goals and Plan of Care (Insert Text)  Outcome: Progressing Towards Goal  Note: GOALS (1-4 days):  (1.)Mr. Garrett Gonzales will move from supine to sit and sit to supine  in bed with SUPERVISION. (2.)Mr. Garrett Gonzales will transfer from bed to chair and chair to bed with SUPERVISION using the least restrictive device. (3.)Mr. Garrett Gonzales will ambulate with SUPERVISION for 250 feet with the least restrictive device. (4.)Mr. Garrett Gonzales will ambulate up/down 2 steps with bilateral  railing with STAND BY ASSIST with no device. (5.)Mr. Garrett Gonzales will state/observe KASANDRA precautions with 0 verbal cues. ________________________________________________________________________________________________      PHYSICAL THERAPY JOINT CAMP KASANDRA: Initial Assessment and PM 3/23/2021  INPATIENT: Hospital Day: 1  [place] / [place] / Product Type: Medicare /      NAME/AGE/GENDER: Addison Cifuentes is a 78 y.o. male   PRIMARY DIAGNOSIS:  Unilateral osteoarthritis of hip, left [M16.12]   Procedure(s) and Anesthesia Type:     * LEFT HIP ARTHROPLASTY TOTAL DIRECT ANTERIOR APPROACH - Spinal (Left)  ICD-10: Treatment Diagnosis:    Pain in left hip (M25.552)  Stiffness of Left Hip, Not elsewhere classified (M25.652)  Difficulty in walking, Not elsewhere classified (R26.2)      ASSESSMENT:     Mr. Garrett Gonzales presents with decreased functional mobility and gait as well as decreased rom and strength of left LE s/p left anterior kasandra. He plans to go home with HHPT today. He transferred and ambulated well with RW, attempted to urinate but was unable. He plans to go home today once he can urinate. He has no steps at home, has a ramp. He did kasandra exercises with cues and only needed assistance on hip abduction. We reviewed safety. Wife present. They had no questions or concerns about going home today.     This section established at most recent assessment   PROBLEM LIST (Impairments causing functional limitations):  Decreased Strength  Decreased ADL/Functional Activities  Decreased Transfer Abilities  Decreased Ambulation Ability/Technique  Decreased Balance  Increased Pain  Decreased Activity Tolerance  Decreased Flexibility/Joint Mobility  Decreased [place] with Home Exercise Program  Decreased strength   INTERVENTIONS PLANNED: (Benefits and precautions of physical therapy have been discussed with the patient.)  Bed Mobility  Cold  Gait Training  Home Exercise Program (HEP)  Range of Motion (ROM)  Therapeutic Activites  Therapeutic Exercise/Strengthening  Transfer Training     TREATMENT PLAN: Frequency/Duration: Follow patient BID for duration of hospital stay to address above goals. Rehabilitation Potential For Stated Goals: Good     RECOMMENDED REHABILITATION/EQUIPMENT: (at time of discharge pending progress): Continue Skilled Therapy and Home Health: Physical Therapy. HISTORY:   History of Present Injury/Illness (Reason for Referral):  S/p left anterior loyda  Past Medical History/Comorbidities:   Mr. Chapincito Menjivar  has a past medical history of Actinic keratosis, Adverse effect of anesthesia, Anxiety, Atrial flutter ([address] CVA (cerebral vascular accident) ([address] (10/23/2018), Erectile dysfunction, Former smoker, Hypertension, Osteoarthritis, Pain in joint involving pelvic region and thigh, Rosacea, and Wide-complex tachycardia ([address] (01/2021). Mr. Chapincito Menjivar  has a past surgical history that includes hx colonoscopy; hx shoulder arthroscopy (Right, 2011); hx rotator cuff repair (Left, 2013); hx orthopaedic (Right, 03/09/2018); hx orthopaedic (Left, 03/2019); hx hip replacement (Right, 09/03/2020); pr anesth,surgery of shoulder; and hx afib ablation (01/07/2021).    Social History/Living Environment:   Home Environment: Other (comment)  # Steps to Enter: 4  Rails to Enter: Yes  Hand Rails : Bilateral  One/Two Story Residence: One story  Living Alone: No  Support Systems: Family member(s)  Patient Expects to be Discharged to[de-identified] Private residence  Current DME Used/Available at Home: None  Tub or Shower Type: Tub/Shower combination    Prior Level of Function/Work/Activity:  independent   Number of Personal Factors/Comorbidities that affect the Plan of Care: 1-2: MODERATE COMPLEXITY   EXAMINATION:   Most Recent Physical Functioning:   Gross Assessment: Yes  Gross Assessment  AROM: Within functional limits(right LE)  Strength: Generally decreased, functional(right LE)  Coordination: Within functional limits(BUE)  Tone: Normal  Sensation: Intact          LLE AROM  L Hip Flexion: 90  L Hip ABduction: 10          Bed Mobility  Supine to Sit: Stand-by assistance  Scooting: Setup    Transfers  Sit to Stand: Stand-by assistance  Stand to Sit: Stand-by assistance  Bed to Chair: Stand-by assistance    Balance  Sitting: Intact  Standing: With support              Weight Bearing Status  Left Side Weight Bearing: As tolerated  Distance (ft): 200 Feet (ft)  Ambulation - Level of Assistance: Stand-by assistance  Assistive Device: Walker, rolling  Speed/Joy: Delayed  Step Length: Left shortened;Right shortened  Stance: Left decreased  Gait Abnormalities: Antalgic  Interventions: Safety awareness training;Verbal cues     Braces/Orthotics:     Left Hip Cold  Type: Cold/ice pack      Body Structures Involved:  Bones  Joints  Muscles  Ligaments Body Functions Affected: Movement Related Activities and Participation Affected: Mobility   Number of elements that affect the Plan of Care: 3: MODERATE COMPLEXITY   CLINICAL PRESENTATION:   Presentation: Stable and uncomplicated: LOW COMPLEXITY   CLINICAL DECISION MAKIN \[facility]\"" [address] [address] AM-PAC 6 Clicks   Basic Mobility Inpatient Short Form  How much difficulty does the patient currently have. .. Unable A Lot A Little None   1. Turning over in bed (including adjusting bedclothes, sheets and blankets)?    [] 1 [] 2   [x] 3   [] 4   2. Sitting down on and standing up from a chair with arms ( e.g., wheelchair, bedside commode, etc.)   [] 1   [] 2   [x] 3   [] 4   3. Moving from lying on back to sitting on the side of the bed? [] 1   [] 2   [] 3   [x] 4   How much help from another person does the patient currently need. .. Total A Lot A Little None   4. Moving to and from a bed to a chair (including a wheelchair)? [] 1   [] 2   [] 3   [x] 4   5. Need to walk in hospital room? [] 1   [] 2   [] 3   [x] 4   6. Climbing 3-5 steps with a railing? [] 1   [] 2   [x] 3   [] 4   © 2007, Trustees of [address] [place] [address], under license to [employer]. All rights reserved     Score:  Initial: 21 Most Recent: X (Date: -- )    Interpretation of Tool:  Represents activities that are increasingly more difficult (i.e. Bed mobility, Transfers, Gait). Medical Necessity:     Patient is expected to demonstrate progress in   strength, range of motion, and balance   to   decrease assistance required with exercises and functional mobility  . Reason for Services/Other Comments:  Patient   continues to require present interventions due to patient's inability to perform exercises and functional mobility independently  . Use of outcome tool(s) and clinical judgement create a POC that gives a: Clear prediction of patient's progress: LOW COMPLEXITY            TREATMENT:   (In addition to Assessment/Re-Assessment sessions the following treatments were rendered)     Pre-treatment Symptoms/Complaints:  mild hip pain  Pain Initial:      Post Session:  no complaints   assessment  Therapeutic Activity: (  10 Minutes ):  Therapeutic activities including Bed transfers, Chair transfers, Ambulation on level ground with RW, and standing with RW to improve mobility, strength, and balance. Required minimal Safety awareness training;Verbal cues to promote static and dynamic balance in standing.      Therapeutic Exercise: (15 Minutes):  Exercises per grid below to improve mobility and strength. Required minimal visual, verbal, and manual cues to promote proper body alignment, promote proper body posture, and promote proper body mechanics. Progressed range and repetitions as indicated. Date:  3/23 Date:   Date:     ACTIVITY/EXERCISE AM PM AM PM AM PM   GROUP THERAPY  []  []  []  []  []  []   Ankle Pumps  15a       Quad Sets  15a       Gluteal Sets  15a       Hip ABd/ADduction  15aa       Straight Leg Raises         Knee Slides  15a       Short Arc Quads  15a       Long Arc Quads         Chair Slides                  B = bilateral; AA = active assistive; A = active; P = passive      Treatment/Session Assessment:     Response to Treatment:  did well. Education:  [x] Home Exercises  [x] Fall Precautions  [x] Hip Precautions [] D/C Instruction Review  [x] Hip Prosthesis Review  [x] Walker Management/Safety [] Adaptive Equipment as Needed       Interdisciplinary Collaboration:   Occupational Therapist  Registered Nurse    After treatment position/precautions:   Up in chair  Bed/Chair-wheels locked  Bed in low position  Caregiver at bedside  Call light within reach  Family at bedside    Compliance with Program/Exercises: Compliant most of the time. Recommendations/Intent for next treatment session:  Treatment next visit will focus on increasing Mr. Darnells independence with bed mobility, transfers, gait training, strength/ROM exercises, modalities for pain, and patient education.       Total Treatment Duration:  PT Patient Time In/Time Out  Time In: 8805  Time Out: [address] [facility], PT

## 2021-03-23 NOTE — H&P
The patient has end stage arthritis of the left hip joint. The patient was seen and examined and there are no changes to the patient's orthopedic condition. They have tried conservative treatment for this condition; including antiinflammatories and lifestyle modifications and have failed. The necessity for the joint replacement is still present, and the H&P from the office is still current. The patient will be admitted today forProcedure(s) (LRB):  LEFT HIP ARTHROPLASTY TOTAL DIRECT ANTERIOR APPROACH/ DEPUY (Left) .

## 2021-03-23 NOTE — PROGRESS NOTES
Pt discharge summary and home medication sheet reviewed and signed by pt. Copy given for take home use. All goals met. Pt voided 250 ml yellow urine. Assessment unchanged. Pt leaving hospital via w/c with staff member and wife.

## 2021-03-23 NOTE — OP NOTES
[facility]  OPERATIVE REPORT  DIRECT ANTERIOR APPROACH TOTAL HIP ARTHROPLASTY    Rah Ch,  MRN: [id number],  : 1950    Pre-operative Diagnosis:  Unilateral osteoarthritis of hip, left [M16.12]    Post-operative Diagnosis: Unilateral osteoarthritis of hip, left [M16.12]  Location: [place] [address]    Procedure: Left Total Hip Arthroplasty     Date of Surgery: 3/23/2021   BMI: Body mass index is 23.63 kg/m². Surgeon: Keila Valerio MD  Assistant:  VERENA Virgen  Anesthesia: Spinal    EBL:  250 cc    Procedure: Total Hip Arthroplasty using the Direct Anterior Approach    IMPLANTS: DePuy      INDICATIONS FOR PROCEDURE:  The patient has degenerative changes of their hip. Because of limitations and pain they requested total hip arthroplasty. Due to the complexity of the case a skilled first assistant was required. DESCRIPTION OF PROCEDURE:  The patient was taken to the operating room. Following an adequate anesthetic induction the patient was prepped and draped in the usual sterile fashion for the direct anterior approach with both limbs free. Preoperative antibiotics and transexamic acid were administered. A time out was preformed. The incision was made approximately 2 cm lateral to the anterior superior iliac spine and then directly toward Gerdy's tubercle of the knee. Dissection was carried through the  subcutaneous tissues and the fascia appreciated. The fascia was opened with Bovie cauterization. Finger dissection was then accomplished through the tensor musculature and sartorius interval from proximal to distal to expose the anterior aspect of the femoral neck. The rectus muscle was retracted anteriorly and a retractor placed inferiorly along the femoral head. A second retractor was also placed in the saddle of the greater trochanter. The anterior circumflex artery and vein were identified and coagulated.      The capsule was then T'd with a large anterior capsular flap developed. A small posterolateral flap was created. Both these flaps were tagged. The retractors were moved inside the capsule. An oscillating saw was then used to transect through the femoral neck at about 14 mm above the lesser trochanter. A second cut was made 7 mm proximal and a wafer of bone removed. The femoral head was then easily removed with a corkscrew. The capsular flaps were retracted anteriorly and posteriorly and retractors placed anteriorly and posteriorly along the acetabulum. Tissue was released inferiorly and then a labrum excision was accomplished. The patient was then sequentially reamed. Fluoroscopic guidance revealed appropriate position of the reamer. The size 54 [place] cup was then impacted at about 45 degrees of horizontal tilt and about 15 degrees of anteversion. Fluoroscopic guidance verified this and 1 screw was placed superiorly. The liner was impacted. Dissection was then carried around the posterior capsular area allowing mobilization of the femur. The foot of the table was dropped 45 degrees and the limb placed in a figure-of-four position. Dissection was then carried posterior and superior to release the piriformis tendon. Retractors were placed posterior to the greater trochanter and the femoral canal identified. A curette and rongeur were used to remove bone from the  posterolateral aspect of the neck. A box osteotome was used to further access the lateral aspect of the intertrochanteric ridge. A canal sounder was used to find the canal. The patient was then progressively broached to a size 7 high offset ACTIS stem. Trial reduction with a 1.5 mm head length revealed the limb lengths to be equilibrated. The patient was stable in extension and external rotation and was stable in flexion to 100 degrees and internal rotation. .     All trial components were removed. The area was copiously irrigated.   The permanent  ACTIS stem was impacted into the canal and was noted to be stable. The permanent Biolox Delta head was tamped into place. The hip was reduced, placed through range of motion and found to be stable and the length to be equilibrated. The area was infiltrated with local anesthetic. The area was soaked in Betadine and again irrigated . A  #1 Vicryl was used to reapproximate the flap capsule. A #1 running Vycryl was used to reapproximate the fascia anteriorly. Transexemic Acid with Vancomycin was injected in the joint. Monocryl 2-0 was used to close the subcutaneous layers. Prineo was used to close the skin. A sterile dressing was applied and the patient was transported to recovery room.         Stella Foster MD

## 2021-03-23 NOTE — ANESTHESIA PREPROCEDURE EVALUATION
Anesthetic History     Other anesthesia complications     Comments: Elevated LFTs after a GA in past  Urinary retention after spinal for KASANDRA     Review of Systems / Medical History  Patient summary reviewed and pertinent labs reviewed    Pulmonary  Within defined limits                 Neuro/Psych       CVA (r hand numbness)  Psychiatric history     Cardiovascular    Hypertension: well controlled        Dysrhythmias (hx Wide Complex Tachycardia- s/p ablation: none since)       Exercise tolerance: >4 METS  Comments: MADELEINE 12/2020 - normal LVEF and no significant valvular abnormalities   GI/Hepatic/Renal  Within defined limits              Endo/Other        Arthritis     Other Findings            Physical Exam    Airway  Mallampati: II  TM Distance: 4 - 6 cm  Neck ROM: normal range of motion   Mouth opening: Normal     Cardiovascular  Regular rate and rhythm,  S1 and S2 normal,  no murmur, click, rub, or gallop             Dental  No notable dental hx       Pulmonary  Breath sounds clear to auscultation               Abdominal         Other Findings            Anesthetic Plan    ASA: 3  Anesthesia type: total IV anesthesia and general    Monitoring Plan: BIS      Induction: Intravenous  Anesthetic plan and risks discussed with: Patient      Surgeon is attempting for same day discharge- Plan general TIVA w/ BIS monitoring

## 2021-03-23 NOTE — ANESTHESIA POSTPROCEDURE EVALUATION
Procedure(s):  LEFT HIP ARTHROPLASTY TOTAL DIRECT ANTERIOR APPROACH.    total IV anesthesia, general    Anesthesia Post Evaluation      Multimodal analgesia: multimodal analgesia used between 6 hours prior to anesthesia start to PACU discharge  Patient location during evaluation: PACU  Patient participation: complete - patient participated  Level of consciousness: awake and alert  Pain management: adequate  Airway patency: patent  Anesthetic complications: no  Cardiovascular status: acceptable  Respiratory status: acceptable, spontaneous ventilation and nonlabored ventilation  Hydration status: acceptable  Post anesthesia nausea and vomiting:  none      INITIAL Post-op Vital signs:   Vitals Value Taken Time   /62 03/23/21 1057   Temp 36.7 °C (98 °F) 03/23/21 1022   Pulse 61 03/23/21 1057   Resp 15 03/23/21 1057   SpO2 96 % 03/23/21 1057

## 2021-03-23 NOTE — PERIOP NOTES
TRANSFER - OUT REPORT:    Verbal report given to Ignacio Desouza RN(name) on Suraj Gobble being transferred to [facility](unit) for routine progression of care       Report consisted of patient's Situation, Background, Assessment and   Recommendations(SBAR). Information from the following report(s) OR Summary, Procedure Summary, Intake/Output and MAR was reviewed with the receiving nurse. Opportunity for questions and clarification was provided.       Patient transported with:   O2 @ 3 liters  Tech

## 2021-03-23 NOTE — PROGRESS NOTES
03/23/21 1603   Oxygen Therapy   O2 Sat (%) 96 %   Pulse via Oximetry 69 beats per minute   O2 Device Room air   O2 Flow Rate (L/min) 0 l/min   Incentive Spirometry Treatment   Actual Volume (ml) 3800 ml   Number of Attempts 2   Patient achieved  3800    Ml/sec on IS. Patient encouraged to do every hour while awake-patient agreed and demonstrated. No shortness of breath or distress noted. BS are clear b/l.    [facility] notes reviewed- continuous sat  ordered HS

## 2021-03-23 NOTE — DISCHARGE INSTRUCTIONS
Patient Education        Hip Replacement Surgery (Posterior): What to Expect at Home  Your Recovery  Hip replacement surgery replaces the worn parts of your hip joint. When you leave the hospital, you will probably be walking with crutches or a walker. You may be able to climb a few stairs and get in and out of bed and chairs. But you will need someone to help you at home for the next few weeks or until you have more energy and can move around better. If you need more extensive rehab, you may go to a specialized rehab center for more treatment. You will go home with a bandage and stitches, staples, tissue glue, or tape strips. You can remove the bandage when your doctor tells you to. If you have stitches or staples, your doctor will remove them 10 days to 3 weeks after your surgery. Glue or tape strips will fall off on their own over time. You may still have some mild pain, and the area may be swollen for 3 to 4 months after surgery. Your doctor will give you medicine for the pain. You will continue the rehabilitation program (rehab) you started in the hospital. The better you do with your rehab exercises, the sooner you will get your strength and movement back. Most people are able to return to work 4 weeks to 4 months after surgery. This care sheet gives you a general idea about how long it will take for you to recover. But each person recovers at a different pace. Follow the steps below to get better as quickly as possible. How can you care for yourself at home? Activity    · Your doctor may not want your affected leg to cross the center of your body toward the other leg. If so, your therapist may suggest these ideas:  ? Do not cross your legs. ? Be very careful as you get in or out of bed or a car, so your leg does not cross that imaginary line in the middle of your body.     · Go slowly when you climb stairs. Make sure the lights are on. Have someone watch you, if you can.  When you climb stairs:  ? Step up first with your unaffected leg. Then bring the affected leg up to the same step. Bring your crutches or cane up. ? To go down stairs, reverse the order. First, put your crutches or cane on the lower step. Then bring the affected leg down to that step. Finally, step down with the unaffected leg.     · You can ride in a car, but stop at least once every hour to get out and walk around.     · You may want to sleep on your back. Don't reach down too far to pull up blankets when you lie in bed.     · If your doctor recommends exercises, do them as directed. You can cut back on your exercises if your muscles start to ache, but don't stop doing them.     · Rest when you feel tired. You may take a nap, but don't stay in bed all day.     · Work with your physical therapist to learn the best way to exercise. You will probably have to use crutches or a walker for at least 4 to 6 weeks.     · Your doctor may advise you to stay away from activities that put stress on the joint. This includes sports such as tennis, football, and jogging.     · Try not to sit for too long at one time. You will feel less stiff if you take a short walk about every hour. When you sit, use chairs with arms, and don't sit in low chairs.     · Do not bend over more than 90 degrees (like the angle in a letter \"L\").     · Sleep on your back with your legs slightly apart or on your side with a pillow between your knees for about 6 weeks or as your doctor tells you. Do not sleep on your stomach or affected leg.     · Ask your doctor when you can drive again.     · Most people are able to return to work 4 weeks to 4 months after surgery.     · Ask your doctor when it is okay for you to have sex. Diet    · By the time you leave the hospital, you will probably be eating your normal diet. If your stomach is upset, try bland, low-fat foods like plain rice, broiled chicken, toast, and yogurt.  Your doctor may recommend that you take iron and vitamin supplements.     · Drink plenty of fluids (unless your doctor tells you not to).   · Eat healthy foods, and watch your portion sizes. Try to stay at your ideal weight. Too much weight puts more stress on your new hip joint.     · You may notice that your bowel movements are not regular right after your surgery. This is common. Try to avoid constipation and straining with bowel movements. You may want to take a fiber supplement every day. If you have not had a bowel movement after a couple of days, ask your doctor about taking a mild laxative. Medicines    · Your doctor will tell you if and when you can restart your medicines. He or she will also give you instructions about taking any new medicines.     · If you take aspirin or some other blood thinner, ask your doctor if and when to start taking it again. Make sure that you understand exactly what your doctor wants you to do.     · Your doctor may give you a blood-thinning medicine to prevent blood clots. If you take a blood thinner, be sure you get instructions about how to take your medicine safely. Blood thinners can cause serious bleeding problems. This medicine could be in pill form or as a shot (injection). If a shot is necessary, your doctor will tell you how to do this.     · Be safe with medicines. Take pain medicines exactly as directed. ? If the doctor gave you a prescription medicine for pain, take it as prescribed. ? If you are not taking a prescription pain medicine, ask your doctor if you can take an over-the-counter medicine.     · If you think your pain medicine is making you sick to your stomach:  ? Take your medicine after meals (unless your doctor has told you not to). ? Ask your doctor for a different pain medicine.     · If your doctor prescribed antibiotics, take them as directed. Do not stop taking them just because you feel better. You need to take the full course of antibiotics.    Incision care    · If your doctor told you how to care for your cut (incision), follow your doctor's instructions. You will have a dressing over the cut. A dressing helps the incision heal and protects it. Your doctor will tell you how to take care of this.     · If you did not get instructions, follow this general advice:  ? If you have strips of tape on the cut the doctor made, leave the tape on for a week or until it falls off.  ? If you have stitches or staples, your doctor will tell you when to come back to have them removed. ? If you have skin adhesive on the cut, leave it on until it falls off. Skin adhesive is also called glue or liquid stitches. ? Change the bandage every day. ? Wash the area daily with warm water, and pat it dry. Don't use hydrogen peroxide or alcohol. They can slow healing. ? You may cover the area with a gauze bandage if it oozes fluid or rubs against clothing. ? You may shower 24 to 48 hours after surgery. Pat the incision dry. Don't swim or take a bath for the first 2 weeks, or until your doctor tells you it is okay. Exercise    · Your rehab program will include a number of exercises to do. Always do them as your therapist tells you. Ice and elevation    · For pain, put ice or a cold pack on the area for 10 to 20 minutes at a time. Put a thin cloth between the ice and your skin.     · Your ankle may swell for about 3 months. Prop up your ankle when you ice it or anytime you sit or lie down. Try to keep it above the level of your heart. This will help reduce swelling. Other instructions    · Continue to wear your support stockings as your doctor says. These help to prevent blood clots. How long you'll have to wear them depends on your activity level and the amount of swelling you have. Most people wear these stockings for 4 to 6 weeks after surgery.     · Try to prevent falls. To avoid falling:  ? Arrange furniture so that you will not trip on it. ? Get rid of throw rugs, and move electrical cords out of the way.   ? Walk only in areas with plenty of light. ? Put grab bars in showers and bathtubs. ? Avoid icy or snowy sidewalks. ? Wear shoes with sturdy, flat soles. Follow-up care is a key part of your treatment and safety. Be sure to make and go to all appointments, and call your doctor if you are having problems. It's also a good idea to know your test results and keep a list of the medicines you take. When should you call for help? Call 911 anytime you think you may need emergency care. For example, call if:    · You passed out (lost consciousness).     · You have severe trouble breathing.     · You have sudden chest pain and shortness of breath, or you cough up blood. Call your doctor now or seek immediate medical care if:    · You have signs that your hip may be dislocated, including:  ? Severe pain and not being able to stand. ? A crooked leg that looks like your hip is out of position. ? Not being able to bend or straighten your leg.     · Your leg or foot is cool or pale or changes color.     · You cannot feel or move your leg.     · You have signs of a blood clot, such as:  ? Pain in your calf, back of the knee, thigh, or groin. ? Redness and swelling in your leg or groin.     · Your incision comes open and begins to bleed, or the bleeding increases.     · You feel like your heart is racing or beating irregularly.     · You have signs of infection, such as:  ? Increased pain, swelling, warmth, or redness. ? Red streaks leading from the incision. ? Pus draining from the incision. ? A fever. Watch closely for changes in your health, and be sure to contact your doctor if:    · You do not have a bowel movement after taking a laxative.     · You do not get better as expected. Where can you learn more? Go to http://www.gray.com/  Enter Q746 in the search box to learn more about \"Hip Replacement Surgery (Posterior): What to Expect at Home. \"  Current as of: March 2, 2020               Content Version: 12.6  © 2006-2020 [employer]. Care instructions adapted under license by [employer] (which disclaims liability or warranty for this information). If you have questions about a medical condition or this instruction, always ask your healthcare professional. [address] any warranty or liability for your use of this information. DISCHARGE SUMMARY from Nurse    PATIENT INSTRUCTIONS:    After general anesthesia or intravenous sedation, for 24 hours or while taking prescription Narcotics:  · Limit your activities  · Do not drive and operate hazardous machinery  · Do not make important personal or business decisions  · Do  not drink alcoholic beverages  · If you have not urinated within 8 hours after discharge, please contact your surgeon on call. Report the following to your surgeon:  · Excessive pain, swelling, redness or odor of or around the surgical area  · Temperature over 100.5  · Nausea and vomiting lasting longer than 4 hours or if unable to take medications  · Any signs of decreased circulation or nerve impairment to extremity: change in color, persistent  numbness, tingling, coldness or increase pain  · Any questions    What to do at Home:  Recommended activity: {discharge activity:63986}, ***    If you experience any of the following symptoms ***, please follow up with ***. *  Please give a list of your current medications to your Primary Care Provider. *  Please update this list whenever your medications are discontinued, doses are      changed, or new medications (including over-the-counter products) are added. *  Please carry medication information at all times in case of emergency situations.     These are general instructions for a healthy lifestyle:    No smoking/ No tobacco products/ Avoid exposure to second hand smoke  Surgeon General's Warning:  Quitting smoking now greatly reduces serious risk to your health. Obesity, smoking, and sedentary lifestyle greatly increases your risk for illness    A healthy diet, regular physical exercise & weight monitoring are important for maintaining a healthy lifestyle    You may be retaining fluid if you have a history of heart failure or if you experience any of the following symptoms:  Weight gain of 3 pounds or more overnight or 5 pounds in a week, increased swelling in our hands or feet or shortness of breath while lying flat in bed. Please call your doctor as soon as you notice any of these symptoms; do not wait until your next office visit. The discharge information has been reviewed with the {PATIENT PARENT GUARDIAN:87349}. The {PATIENT PARENT GUARDIAN:36532} verbalized understanding. Discharge medications reviewed with the {Dishcarge meds reviewed UJAE:59928} and appropriate educational materials and side effects teaching were provided.   ___________________________________________________________________________________________________________________________________

## 2021-03-23 NOTE — PROGRESS NOTES
Problem: Self Care Deficits Care Plan (Adult)  Goal: *Acute Goals and Plan of Care (Insert Text)  Description: GOALS:   DISCHARGE GOALS (in preparation for going home/rehab):  3 days  1. Mr. Figueroa Sainz will perform one lower body dressing activity with minimal assistance with adaptive equipment to demonstrate improved functional mobility and safety. 2.  Mr. Figueroa Sainz will perform one lower body bathing activity with minimal  assistance with adaptive equipment to demonstrate improved functional mobility and safety. 3.  Mr. Figueroa Sainz will perform toileting/toilet transfer with contact guard assistance with adaptive equipment to demonstrate improved functional mobility and safety. 4.  Mr. Figueroa Sainz will perform shower transfer with contact guard assistance with adaptive equipment to demonstrate improved functional mobility and safety. 5.  Mr. Figueroa Sainz will state KASANDRA precautions with two verbal cues to demonstrate improved functional mobility and safety. Outcome: Progressing Towards Goal      JOINT CAMP OCCUPATIONAL THERAPY KASANDRA: Initial Assessment, Daily Note, and PM 3/23/2021  INPATIENT: Hospital Day: 1  [place] / [place] / Product Type: Medicare /      NAME/AGE/GENDER: Tri Cortes is a 78 y.o. male   PRIMARY DIAGNOSIS:  Unilateral osteoarthritis of hip, left [M16.12]   Procedure(s) and Anesthesia Type:     * LEFT HIP ARTHROPLASTY TOTAL DIRECT ANTERIOR APPROACH - Spinal (Left)  ICD-10: Treatment Diagnosis:    · Pain in left hip (M25.552)  · Stiffness of Left Hip, Not elsewhere classified (N49.873)      ASSESSMENT:     Mr. Figueroa Sainz is s/p L KASANDRA and presents with decreased weight bearing on L LE and decreased independence with functional mobility and activities of daily living as compared to prior level of function and safety.   Patient would benefit from skilled Occupational Therapy to maximize independence and safety with self-care task and functional mobility. Pt would also benefit from education on lower body adaptive equipment and hip precautions post-surgery in preparation for going home. Patient plans for further rehab at home with home health services and good family support. OT reviewed therapy schedule and plan of care with patient. Patient was able to transfer and perform self care skills as charted below. Patient instructed to call for assistance when needing to get up from the recliner and all needs in reach. Patient verbalized understanding of call light. Pt completed toileting, dressing and functional transfers this pm.    This section established at most recent assessment   PROBLEM LIST (Impairments causing functional limitations):  1. Decreased Strength  2. Decreased ADL/Functional Activities  3. Decreased Transfer Abilities  4. Increased Pain  5. Increased Fatigue  6. Decreased Flexibility/Joint Mobility  7. Decreased Knowledge of Precautions   INTERVENTIONS PLANNED: (Benefits and precautions of occupational therapy have been discussed with the patient.)  1. Activities of daily living training  2. Adaptive equipment training  3. Balance training  4. Clothing management  5. Donning&doffing training  6. Theraputic activity     TREATMENT PLAN: Frequency/Duration: Follow patient qd to address above goals. Rehabilitation Potential For Stated Goals: Good     RECOMMENDED REHABILITATION/EQUIPMENT: (at time of discharge pending progress): Continue Skilled Therapy and Home Health: Physical Therapy. OCCUPATIONAL PROFILE AND HISTORY:   History of Present Injury/Illness (Reason for Referral): Pt presents this date s/p (L) KASANDRA.     Past Medical History/Comorbidities:   Mr. Nae Chisholm  has a past medical history of Actinic keratosis, Adverse effect of anesthesia, Anxiety, Atrial flutter ([address] CVA (cerebral vascular accident) ([address] (10/23/2018), Erectile dysfunction, Former smoker, Hypertension, Osteoarthritis, Pain in joint involving pelvic region and thigh, Rosacea, and Wide-complex tachycardia ([address] (01/2021). Mr. Alis Joshi  has a past surgical history that includes hx colonoscopy; hx shoulder arthroscopy (Right, 2011); hx rotator cuff repair (Left, 2013); hx orthopaedic (Right, 03/09/2018); hx orthopaedic (Left, 03/2019); hx hip replacement (Right, 09/03/2020); pr anesth,surgery of shoulder; and hx afib ablation (01/07/2021). Social History/Living Environment:   Home Environment: Other (comment)  One/Two Story Residence: One story  Living Alone: No  Support Systems: Family member(s)  Patient Expects to be Discharged to[de-identified] Private residence  Current DME Used/Available at Home: None  Prior Level of Function/Work/Activity:  independent     Number of Personal Factors/Comorbidities that affect the Plan of Care: Brief history (0):  LOW COMPLEXITY   ASSESSMENT OF OCCUPATIONAL PERFORMANCE[de-identified]   Most Recent Physical Functioning:   Balance  Sitting: Intact  Standing: With support       Gross Assessment: Yes  Gross Assessment  AROM: Within functional limits(BUE)  Strength:  Within functional limits(BUE)  Coordination: Within functional limits(BUE)  Tone: Normal  Sensation: Intact                 Vision  Acuity: Within Defined Limits    Mental Status  Neurologic State: Alert  Orientation Level: Oriented X4  Cognition: Follows commands  Perception: Appears intact  Perseveration: No perseveration noted  Safety/Judgement: Fall prevention                Basic ADLs (From Assessment) Complex ADLs (From Assessment)   Basic ADL  Feeding: Setup  Oral Facial Hygiene/Grooming: Setup  Bathing: Minimum assistance  Upper Body Dressing: Setup  Lower Body Dressing: Minimum assistance  Toileting: Setup     Grooming/Bathing/Dressing Activities of Daily Living     Cognitive Retraining  Safety/Judgement: Fall prevention                 Functional Transfers  Bathroom Mobility: Minimum assistance  Toilet Transfer : Minimum assistance  Shower Transfer: Minimum assistance     Bed/Mat Mobility  Supine to Sit: Setup  Sit to Stand: Minimum assistance  Stand to Sit: Minimum assistance  Bed to Chair: Minimum assistance  Scooting: Setup         Physical Skills Involved:  1. Balance  2. Strength  3. Activity Tolerance Cognitive Skills Affected (resulting in the inability to perform in a timely and safe manner): 1. none Psychosocial Skills Affected:  1. none   Number of elements that affect the Plan of Care: 1-3:  LOW COMPLEXITY   CLINICAL DECISION MAKING:   [facility] AM-PAC 6 Clicks   Daily Activity Inpatient Short Form  How much help from another person does the patient currently need. .. Total A Lot A Little None   1. Putting on and taking off regular lower body clothing? [] 1   [] 2   [x] 3   [] 4   2. Bathing (including washing, rinsing, drying)? [] 1   [] 2   [x] 3   [] 4   3. Toileting, which includes using toilet, bedpan or urinal?   [] 1   [] 2   [x] 3   [] 4   4. Putting on and taking off regular upper body clothing? [] 1   [] 2   [] 3   [x] 4   5. Taking care of personal grooming such as brushing teeth? [] 1   [] 2   [] 3   [x] 4   6. Eating meals? [] 1   [] 2   [] 3   [x] 4   © 2007, Trustees of [facility], under license to [employer]. All rights reserved     Score:  Initial: 21 Most Recent: X (Date: -- )    Interpretation of Tool:  Represents activities that are increasingly more difficult (i.e. Bed mobility, Transfers, Gait). Medical Necessity:     · Patient is expected to demonstrate progress in   · strength, balance, coordination, and functional technique  ·  to   · increase independence with self care and functional mobility   · . Reason for Services/Other Comments:  · Patient continues to require skilled intervention due to   · Decrease self care and functional mobility   · .    Use of outcome tool(s) and clinical judgement create a POC that gives a: LOW COMPLEXITY            TREATMENT:   (In addition to Assessment/Re-Assessment sessions the following treatments were rendered)     Pre-treatment Symptoms/Complaints:  tolerated sitting in chair  Pain: Initial:   Pain Intensity 1: 0  Post Session:  0     Self Care: (10): Procedure(s) (per grid) utilized to improve and/or restore self-care/home management as related to dressing, bathing, toileting, and grooming. Required minimal verbal and   cueing to facilitate activities of daily living skills and compensatory activities. Assessment/Reassessment (5)    Treatment/Session Assessment:     Response to Treatment:  tolerated well. Education:  [] Home Exercises  [x] Fall Precautions  [x] Hip Precautions [] Going Home Video  [] Knee/Hip Prosthesis Review  [x] Walker Management/Safety [x] Adaptive Equipment as Needed       Interdisciplinary Collaboration:   o Physical Therapist  o Occupational Therapist  o Registered Nurse    After treatment position/precautions:   o Up in chair  o Bed/Chair-wheels locked  o Caregiver at bedside  o Call light within reach  o RN notified  o LEs reclined      Compliance with Program/Exercises: Compliant all of the time. Recommendations/Intent for next treatment session:  Treatment next visit will focus on increasing Mr. Rockwell independence with bed mobility, transfers, self care, functional mobility, modalities for pain, and patient education.       Total Treatment Duration:  OT Patient Time In/Time Out  Time In: 1300  Time Out: [address]

## 2021-03-23 NOTE — PERIOP NOTES
TRANSFER - OUT REPORT:    Verbal report given to [facility] on Jaiden Rojo  being transferred to preop holding for routine progression of care       Report consisted of patients Situation, Background, Assessment and   Recommendations(SBAR). Information from the following report(s) SBAR, MAR and Recent Results was reviewed with the receiving nurse. Lines:   Peripheral IV 03/23/21 Left Arm (Active)   Site Assessment Clean, dry, & intact 03/23/21 0651   Phlebitis Assessment 0 03/23/21 0651   Infiltration Assessment 0 03/23/21 0651   Dressing Status Clean, dry, & intact 03/23/21 0651   Dressing Type Tape 03/23/21 0651   Hub Color/Line Status Green; Infusing 03/23/21 0651   Action Taken Blood drawn 03/23/21 7533        Opportunity for questions and clarification was provided. Patient transported with:   Tech      PT/INR 11.5/1.0. Metoprolol taken last PM as normal.  Off Xarelto last dose 3-19-21.

## 2021-03-23 NOTE — PROGRESS NOTES
Care Management Interventions  PCP Verified by CM: Yes  Mode of Transport at Discharge: Self  Transition of Care Consult (CM Consult): [address]: No  Reason Outside [place]: Patient already serviced by other home care/hospice agency  Discharge Durable Medical Equipment: No  Physical Therapy Consult: Yes  Occupational Therapy Consult: Yes  Current Support Network: Own Home  Confirm Follow Up Transport: Family  The Plan for Transition of Care is Related to the Following Treatment Goals : Return to independent function. The Patient and/or Patient Representative was Provided with a Choice of Provider and Agrees with the Discharge Plan?: Yes  Freedom of Choice List was Provided with Basic Dialogue that Supports the Patient's Individualized Plan of Care/Goals, Treatment Preferences and Shares the Quality Data Associated with the Providers?: Yes  Discharge Location  Discharge Placement: Home with home health    Patient is a 78y.o. year old male admitted for Left KASANDRA . Patient plans to return home on discharge. Order received to arrange home health. Patient requests [facility]. Referral sent to and confirmed with Catherine Gurrolaly. Patient denies any equipment needs as patient has a walker and bedside commode. Will follow until discharge.

## 2021-03-24 LAB
INR BLD: 1 (ref 0.9–1.2)
PT BLD: 11.5 SECS (ref 9.6–11.6)

## 2021-05-10 ENCOUNTER — APPOINTMENT (OUTPATIENT)
Dept: GENERAL RADIOLOGY | Age: 71
DRG: 201 | End: 2021-05-10
Attending: EMERGENCY MEDICINE
Payer: MEDICARE

## 2021-05-10 ENCOUNTER — HOSPITAL ENCOUNTER (INPATIENT)
Age: 71
LOS: 5 days | Discharge: HOME | DRG: 201 | End: 2021-05-15
Attending: EMERGENCY MEDICINE | Admitting: INTERNAL MEDICINE
Payer: MEDICARE

## 2021-05-10 DIAGNOSIS — Z79.01 CHRONIC ANTICOAGULATION: Chronic | ICD-10-CM

## 2021-05-10 DIAGNOSIS — Z87.891 HISTORY OF TOBACCO USE: Chronic | ICD-10-CM

## 2021-05-10 DIAGNOSIS — J93.9 PNEUMOTHORAX ON RIGHT: ICD-10-CM

## 2021-05-10 PROBLEM — I10 HYPERTENSION: Chronic | Status: ACTIVE | Noted: 2018-10-31

## 2021-05-10 PROBLEM — M16.11 ARTHRITIS OF RIGHT HIP: Chronic | Status: ACTIVE | Noted: 2020-09-03

## 2021-05-10 PROBLEM — E78.00 HYPERCHOLESTEREMIA: Chronic | Status: ACTIVE | Noted: 2017-10-30

## 2021-05-10 PROBLEM — R35.1 NOCTURIA: Chronic | Status: ACTIVE | Noted: 2020-11-05

## 2021-05-10 PROBLEM — M85.80 OSTEOPENIA: Chronic | Status: ACTIVE | Noted: 2020-03-31

## 2021-05-10 PROBLEM — M16.12 OSTEOARTHRITIS OF LEFT HIP: Chronic | Status: ACTIVE | Noted: 2021-03-23

## 2021-05-10 PROBLEM — Z79.899 ENCOUNTER FOR LONG-TERM CURRENT USE OF MEDICATION: Chronic | Status: ACTIVE | Noted: 2019-10-15

## 2021-05-10 PROBLEM — R97.20 ELEVATED PSA: Chronic | Status: ACTIVE | Noted: 2020-11-10

## 2021-05-10 PROBLEM — E21.0 PRIMARY HYPERPARATHYROIDISM (HCC): Chronic | Status: ACTIVE | Noted: 2019-12-13

## 2021-05-10 PROBLEM — G47.00 INSOMNIA: Chronic | Status: ACTIVE | Noted: 2017-10-30

## 2021-05-10 PROBLEM — I69.30 HISTORY OF STROKE WITH RESIDUAL DEFICIT: Chronic | Status: ACTIVE | Noted: 2018-11-07

## 2021-05-10 PROBLEM — E55.9 VITAMIN D DEFICIENCY: Chronic | Status: ACTIVE | Noted: 2020-03-31

## 2021-05-10 PROBLEM — R06.83 SNORING: Chronic | Status: ACTIVE | Noted: 2020-08-20

## 2021-05-10 PROBLEM — E87.1 HYPONATREMIA: Status: RESOLVED | Noted: 2020-03-31 | Resolved: 2021-05-10

## 2021-05-10 PROBLEM — M19.90 OSTEOARTHRITIS: Chronic | Status: ACTIVE | Noted: 2020-07-07

## 2021-05-10 LAB
ALBUMIN SERPL-MCNC: 4 G/DL (ref 3.2–4.6)
ALBUMIN/GLOB SERPL: 1.2 {RATIO} (ref 1.2–3.5)
ALP SERPL-CCNC: 110 U/L (ref 50–136)
ALT SERPL-CCNC: 25 U/L (ref 12–65)
ANION GAP SERPL CALC-SCNC: 3 MMOL/L (ref 7–16)
AST SERPL-CCNC: 20 U/L (ref 15–37)
BASOPHILS # BLD: 0.1 K/UL (ref 0–0.2)
BASOPHILS NFR BLD: 1 % (ref 0–2)
BILIRUB SERPL-MCNC: 0.4 MG/DL (ref 0.2–1.1)
BUN SERPL-MCNC: 8 MG/DL (ref 8–23)
CALCIUM SERPL-MCNC: 10 MG/DL (ref 8.3–10.4)
CHLORIDE SERPL-SCNC: 107 MMOL/L (ref 98–107)
CO2 SERPL-SCNC: 28 MMOL/L (ref 21–32)
CREAT SERPL-MCNC: 0.69 MG/DL (ref 0.8–1.5)
DIFFERENTIAL METHOD BLD: NORMAL
EOSINOPHIL # BLD: 0.1 K/UL (ref 0–0.8)
EOSINOPHIL NFR BLD: 2 % (ref 0.5–7.8)
ERYTHROCYTE [DISTWIDTH] IN BLOOD BY AUTOMATED COUNT: 12.7 % (ref 11.9–14.6)
GLOBULIN SER CALC-MCNC: 3.3 G/DL (ref 2.3–3.5)
GLUCOSE SERPL-MCNC: 93 MG/DL (ref 65–100)
HCT VFR BLD AUTO: 45.9 % (ref 41.1–50.3)
HGB BLD-MCNC: 15.1 G/DL (ref 13.6–17.2)
IMM GRANULOCYTES # BLD AUTO: 0 K/UL (ref 0–0.5)
IMM GRANULOCYTES NFR BLD AUTO: 0 % (ref 0–5)
INR PPP: 1.2
LYMPHOCYTES # BLD: 1.6 K/UL (ref 0.5–4.6)
LYMPHOCYTES NFR BLD: 25 % (ref 13–44)
MCH RBC QN AUTO: 31.9 PG (ref 26.1–32.9)
MCHC RBC AUTO-ENTMCNC: 32.9 G/DL (ref 31.4–35)
MCV RBC AUTO: 97 FL (ref 79.6–97.8)
MONOCYTES # BLD: 0.6 K/UL (ref 0.1–1.3)
MONOCYTES NFR BLD: 10 % (ref 4–12)
NEUTS SEG # BLD: 3.9 K/UL (ref 1.7–8.2)
NEUTS SEG NFR BLD: 62 % (ref 43–78)
NRBC # BLD: 0 K/UL (ref 0–0.2)
PLATELET # BLD AUTO: 263 K/UL (ref 150–450)
PMV BLD AUTO: 9.7 FL (ref 9.4–12.3)
POTASSIUM SERPL-SCNC: 4 MMOL/L (ref 3.5–5.1)
PROT SERPL-MCNC: 7.3 G/DL (ref 6.3–8.2)
PROTHROMBIN TIME: 15.6 SEC (ref 12.5–14.7)
RBC # BLD AUTO: 4.73 M/UL (ref 4.23–5.6)
SODIUM SERPL-SCNC: 138 MMOL/L (ref 138–145)
WBC # BLD AUTO: 6.3 K/UL (ref 4.3–11.1)

## 2021-05-10 PROCEDURE — 65660000000 HC RM CCU STEPDOWN

## 2021-05-10 PROCEDURE — 2709999900 HC NON-CHARGEABLE SUPPLY

## 2021-05-10 PROCEDURE — 85025 COMPLETE CBC W/AUTO DIFF WBC: CPT

## 2021-05-10 PROCEDURE — 85610 PROTHROMBIN TIME: CPT

## 2021-05-10 PROCEDURE — 99223 1ST HOSP IP/OBS HIGH 75: CPT | Performed by: INTERNAL MEDICINE

## 2021-05-10 PROCEDURE — 74011250637 HC RX REV CODE- 250/637: Performed by: INTERNAL MEDICINE

## 2021-05-10 PROCEDURE — 71046 X-RAY EXAM CHEST 2 VIEWS: CPT

## 2021-05-10 PROCEDURE — 99282 EMERGENCY DEPT VISIT SF MDM: CPT

## 2021-05-10 PROCEDURE — 80053 COMPREHEN METABOLIC PANEL: CPT

## 2021-05-10 RX ORDER — LOSARTAN POTASSIUM 50 MG/1
50 TABLET ORAL
Status: DISCONTINUED | OUTPATIENT
Start: 2021-05-10 | End: 2021-05-15 | Stop reason: HOSPADM

## 2021-05-10 RX ORDER — FLUTICASONE PROPIONATE 50 MCG
2 SPRAY, SUSPENSION (ML) NASAL DAILY
Status: DISCONTINUED | OUTPATIENT
Start: 2021-05-11 | End: 2021-05-15 | Stop reason: HOSPADM

## 2021-05-10 RX ORDER — FACIAL-BODY WIPES
10 EACH TOPICAL DAILY PRN
Status: DISCONTINUED | OUTPATIENT
Start: 2021-05-10 | End: 2021-05-15 | Stop reason: HOSPADM

## 2021-05-10 RX ORDER — METOPROLOL SUCCINATE 25 MG/1
25 TABLET, EXTENDED RELEASE ORAL EVERY EVENING
Status: DISCONTINUED | OUTPATIENT
Start: 2021-05-10 | End: 2021-05-15 | Stop reason: HOSPADM

## 2021-05-10 RX ORDER — HYDROCODONE BITARTRATE AND ACETAMINOPHEN 7.5; 325 MG/1; MG/1
1 TABLET ORAL
Status: DISCONTINUED | OUTPATIENT
Start: 2021-05-10 | End: 2021-05-12

## 2021-05-10 RX ORDER — SODIUM CHLORIDE 0.9 % (FLUSH) 0.9 %
5-40 SYRINGE (ML) INJECTION AS NEEDED
Status: DISCONTINUED | OUTPATIENT
Start: 2021-05-10 | End: 2021-05-15 | Stop reason: HOSPADM

## 2021-05-10 RX ORDER — ACETAMINOPHEN 325 MG/1
650 TABLET ORAL
Status: DISCONTINUED | OUTPATIENT
Start: 2021-05-10 | End: 2021-05-12

## 2021-05-10 RX ORDER — SODIUM CHLORIDE 0.9 % (FLUSH) 0.9 %
5-40 SYRINGE (ML) INJECTION EVERY 8 HOURS
Status: DISCONTINUED | OUTPATIENT
Start: 2021-05-10 | End: 2021-05-15 | Stop reason: HOSPADM

## 2021-05-10 RX ADMIN — Medication 10 ML: at 17:47

## 2021-05-10 RX ADMIN — Medication 10 ML: at 21:49

## 2021-05-10 RX ADMIN — LOSARTAN POTASSIUM 50 MG: 50 TABLET, FILM COATED ORAL at 21:49

## 2021-05-10 RX ADMIN — METOPROLOL SUCCINATE 25 MG: 25 TABLET, EXTENDED RELEASE ORAL at 17:47

## 2021-05-10 NOTE — ED NOTES
TRANSFER - OUT REPORT:    Verbal report given to Maribeth Powell RN on Ernst Mace  being transferred to 6th floor for routine progression of care       Report consisted of patients Situation, Background, Assessment and   Recommendations(SBAR). Information from the following report(s) SBAR, ED Summary and MAR was reviewed with the receiving nurse. Lines:   Peripheral IV 05/10/21 Left Antecubital (Active)   Site Assessment Clean, dry, & intact 05/10/21 1401   Phlebitis Assessment 0 05/10/21 1401   Infiltration Assessment 0 05/10/21 1401   Dressing Status Clean, dry, & intact 05/10/21 1401   Hub Color/Line Status Pink 05/10/21 1401   Action Taken Blood drawn 05/10/21 1401        Opportunity for questions and clarification was provided.       Patient transported with:   O2 @ 6 liters

## 2021-05-10 NOTE — H&P
CONSULT NOTE    Sanam Rodriguez    5/10/2021    Date of Admission:  5/10/2021    The patient's chart is reviewed and the patient is discussed with the staff. Subjective:        Patient is a 73 y.o.  male seen and evaluated at the request of Dr. Topher Julian. He went to see his PCP today for follow up for elevated PSA. He commented to his PCP that he had a been short of breath with an unproductive cough for the past 7 to 10 days. He smoked for 40 years (1ppd) but quit in 2018. He does not use any inhalers and is typically not short of breath. His PCP ordered a CXR that showed a right sided ptx. He takes Xarelto for a history of a fib/flutter. He is followed by Dr. Prakash Jang. His last dose was yesterday morning. He denies any recent trauma. Review of Systems  A comprehensive review of systems was negative except for: Constitutional: positive for weight loss  Eyes: positive for contacts/glasses  Ears, nose, mouth, throat, and face: positive for none  Respiratory: positive for cough or dyspnea on exertion  Cardiovascular: positive for none  Gastrointestinal: positive for decreased appetite with about a 15 lb weight loss.  History of colon polyps   Genitourinary: positive for elevated PSA  Integument/breast: positive for none  Hematologic/lymphatic: positive for none  Musculoskeletal: positive for arthralgias  Neurological: positive for history of stroke with right hand weakness  Behvioral/Psych: positive for anxiety  Endocrine: positive for none  Allergic/Immunologic: positive for none    Patient Active Problem List   Diagnosis Code    Anxiety F41.9    Erectile dysfunction N52.9    Colon polyp K63.5    Insomnia G47.00    Hypercholesteremia E78.00    Hypertension I10    History of stroke with residual deficit I69.30    Encounter for long-term current use of medication Z79.899    Primary hyperparathyroidism ([facility] [address] E21.0    Vitamin D deficiency E55.9    Osteopenia M85.80    Osteoarthritis M19.90    Snoring R06.83    Arthritis of right hip M16.11    Nocturia  R35.1    Elevated PSA R97.20    Osteoarthritis of left hip M16.12    Pneumothorax on right J93.9    History of tobacco use Z87.891    Chronic anticoagulation Z79.01         Prior to Admission Medications   Prescriptions Last Dose Informant Patient Reported? Taking? HYDROcodone-acetaminophen (NORCO)  mg tablet   Yes No   Sig: Take 1 Tab by mouth.   ergocalciferol (ERGOCALCIFEROL) 1,250 mcg (50,000 unit) capsule   No No   Sig: Day 1 and 15 of each month   fluticasone propionate (FLONASE) 50 mcg/actuation nasal spray   No No   Si Sprays by Both Nostrils route daily. losartan (COZAAR) 50 mg tablet   No No   Sig: Take 1 Tab by mouth nightly. Indications: high blood pressure   Patient taking differently: Take 50 mg by mouth daily (with dinner). Indications: high blood pressure   melatonin 3 mg tablet   Yes No   Sig: Take 3 mg by mouth nightly. metoprolol succinate (TOPROL-XL) 50 mg XL tablet   No No   Sig: Take 1 Tab by mouth daily. Patient taking differently: Take 25 mg by mouth every evening. rivaroxaban (XARELTO) 20 mg tab tablet   No No   Sig: Take 1 Tab by mouth daily (with dinner). HOLD XARELTO UNTIL SIDDHARTHA 3/28/21. MAY DISCONTINE ASPIRIN AND RESUME THEN, IF NO WOUND DRAINAGE. Patient taking differently: Take 20 mg by mouth daily (with dinner).  E.   tadalafiL (CIALIS) 20 mg tablet   No No   Sig: TAKE 1 TABLET AS NEEDED FOR ERECTILE DYSFUNCTION      Facility-Administered Medications: None       Past Medical History:   Diagnosis Date    Actinic keratosis     Adverse effect of anesthesia     liver enzymes increased for 1 week following general anesthesia for shoulder sx    Anxiety     Atrial flutter ([address]     s/p ablation 21    CVA (cerebral vascular accident) ([address] 10/23/2018    numbness remains in R hand    Erectile dysfunction     Former smoker     Quit 2018--- 1 ppd x 40 yrs  Hypertension     managed with med    Osteoarthritis     feet--- surg for correction    Pain in joint involving pelvic region and thigh     Rosacea     Wide-complex tachycardia ([address] 01/2021    pt has ILR in place and is followed by Dr Franklin Petty ([facility] cardio)      Active Ambulatory Problems     Diagnosis Date Noted    Anxiety 05/10/2016    Erectile dysfunction     Colon polyp 05/10/2016    Insomnia 10/30/2017    Hypercholesteremia 10/30/2017    Hypertension 10/31/2018    History of stroke with residual deficit 11/07/2018    Encounter for long-term current use of medication 10/15/2019    Primary hyperparathyroidism ([address] 12/13/2019    Vitamin D deficiency 03/31/2020    Osteopenia 03/31/2020    Osteoarthritis 07/07/2020    Snoring 08/20/2020    Arthritis of right hip 09/03/2020    Nocturia  11/05/2020    Elevated PSA 11/10/2020    Osteoarthritis of left hip 03/23/2021     Resolved Ambulatory Problems     Diagnosis Date Noted    Smoker 10/30/2017    Hyponatremia 03/31/2020     Past Medical History:   Diagnosis Date    Actinic keratosis     Adverse effect of anesthesia     Atrial flutter ([address]     CVA (cerebral vascular accident) ([address] 10/23/2018    Former smoker     Pain in joint involving pelvic region and thigh     Rosacea     Wide-complex tachycardia ([address] 01/2021     Past Surgical History:   Procedure Laterality Date    HX AFIB ABLATION  01/07/2021    pt has loop recorder    HX COLONOSCOPY      HX HIP REPLACEMENT Right 09/03/2020    HX ORTHOPAEDIC Right 03/09/2018    RIGHT FOOT 1ST MTP CHEILECTOMY     HX ORTHOPAEDIC Left 03/2019    elbow surg    HX ROTATOR CUFF REPAIR Left 2013    HX SHOULDER ARTHROSCOPY Right 2011    bicep    [place] ANESTH,SURGERY OF SHOULDER       Social History     Socioeconomic History    Marital status:      Spouse name: Not on file    Number of children: Not on file    Years of education: Not on file    Highest education level: Not on file Occupational History    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/   Social Needs    Financial resource strain: Not on file    Food insecurity     Worry: Not on file     Inability: Not on file   [employer] needs     Medical: Not on file     Non-medical: Not on file   Tobacco Use    Smoking status: Former Smoker     Packs/day: 1.00     Years: 40.00     Pack years: 40.00     Quit date: 2/21/2018     Years since quitting: 3.2    Smokeless tobacco: Never Used   Substance and Sexual Activity    Alcohol use:  Yes     Alcohol/week: 2.0 standard drinks     Types: 2 Glasses of wine per week    Drug use: No    Sexual activity: Not on file   Lifestyle    Physical activity     Days per week: Not on file     Minutes per session: Not on file    Stress: Not on file   Relationships    Social connections     Talks on phone: Not on file     Gets together: Not on file     Attends Sabianism service: Not on file     Active member of club or organization: Not on file     Attends meetings of clubs or organizations: Not on file     Relationship status: Not on file    Intimate partner violence     Fear of current or ex partner: Not on file     Emotionally abused: Not on file     Physically abused: Not on file     Forced sexual activity: Not on file   Other Topics Concern     Service Not Asked    Blood Transfusions Not Asked    Caffeine Concern Not Asked     Comment: 4 cups of coffee per day    Occupational Exposure Not Asked   [place] Hazards Not Asked    Sleep Concern Not Asked    Stress Concern Not Asked    Weight Concern Not Asked    Special Diet Not Asked    Back Care Not Asked    Exercise No    Bike Helmet Not Asked    Seat Belt Not Asked    Self-Exams Not Asked   Social History Narrative    Lives with wife     Family History   Problem Relation Age of Onset    Heart Disease Mother     Heart Disease Father     Heart Attack Brother         before age 61  Hypertension Brother     Heart Disease Brother      No Known Allergies    No current facility-administered medications for this encounter. Current Outpatient Medications   Medication Sig    HYDROcodone-acetaminophen (NORCO)  mg tablet Take 1 Tab by mouth.  fluticasone propionate (FLONASE) 50 mcg/actuation nasal spray 2 Sprays by Both Nostrils route daily.  rivaroxaban (XARELTO) 20 mg tab tablet Take 1 Tab by mouth daily (with dinner). HOLD XARELTO UNTIL SIDDHARTHA 3/28/21. MAY DISCONTINE ASPIRIN AND RESUME THEN, IF NO WOUND DRAINAGE. (Patient taking differently: Take 20 mg by mouth daily (with dinner). E.)    metoprolol succinate (TOPROL-XL) 50 mg XL tablet Take 1 Tab by mouth daily. (Patient taking differently: Take 25 mg by mouth every evening.)    tadalafiL (CIALIS) 20 mg tablet TAKE 1 TABLET AS NEEDED FOR ERECTILE DYSFUNCTION    losartan (COZAAR) 50 mg tablet Take 1 Tab by mouth nightly. Indications: high blood pressure (Patient taking differently: Take 50 mg by mouth daily (with dinner). Indications: high blood pressure)    ergocalciferol (ERGOCALCIFEROL) 1,250 mcg (50,000 unit) capsule Day 1 and 15 of each month    melatonin 3 mg tablet Take 3 mg by mouth nightly. Objective:     Vitals:    05/10/21 1359   BP: (!) 153/97   Pulse: 89   Resp: 18   Temp: 98.9 °F (37.2 °C)   SpO2: 95%   Weight: 174 lb (78.9 kg)   Height: 6' (1.829 m)       PHYSICAL EXAM     Constitutional:  the patient is well developed and in no acute distress  HEENT:  Sclera clear, pupils equal, oral mucosa moist  Lungs: clear bilaterally but decreased on the right - on room air  Cardiovascular:  RRR without M,G,R  Abd/GI: soft and non-tender; with positive bowel sounds. Ext: warm without cyanosis. There is no lower leg edema. Skin:  no jaundice or rashes, no wounds   Neuro: no gross neuro deficits. Alert and oriented  Musculoskeletal: moves all four extremities. No deformities. Psychiatric: calm.  Does not appear anxious or depressed  Chest X-ray:          Recent Labs     05/10/21  1401   WBC 6.3   HGB 15.1   HCT 45.9      INR 1.2     Recent Labs     05/10/21  1401      K 4.0      GLU 93   CO2 28   BUN 8   CREA 0.69*   CA 10.0   ALB 4.0         Assessment:  (Medical Decision Making)     Hospital Problems  Date Reviewed: 5/10/2021          Codes Class Noted POA    * (Principal) Pneumothorax on right ICD-10-CM: J93.9  ICD-9-CM: 512.89  5/10/2021 Yes    Appears to be partially loculated    History of tobacco use (Chronic) ICD-10-CM: T46.599  ICD-9-CM: V15.82  5/10/2021 Yes        Chronic anticoagulation (Chronic) ICD-10-CM: Z79.01  ICD-9-CM: V58.61  5/10/2021 Yes    Took Xarelto last PM.          Plan:  (Medical Decision Making)   1. Admit to floor bed with oxygen support (6 liters) and follow up CXR in the morning. He takes Xarelto for a history of a fib/flutter and his last dose was yesterday morning. Will continue to hold and plan to place a chest tube tomorrow (after review of am CXR). Dre Lancaster NP      More than 50% of time documented was spent face-to-face contact with the patient and in the care of the patient on the floor/unit where the patient is located    Lungs:  Decreased BS on R with scattered rhonchi  Heart:  RRR with no Murmur/Rubs/Gallops    Additional Comments:  Pt with moderately large R PTX which appears to be loculated laterally. RUL stuck to chest wall. Poor candidate for immediate CT due to risk of bleeding internally. Appears to be in no distress on RA. Will place on O2 for N2 washout and hold Xarelto. Check F/U  CXR in AM. Will likely need CT placed tomorrow but if condition changes with worsening oxygenation or evidence of tension, could place pigtail catheter      I have spoken with and examined the patient. I agree with the above assessment and plan as documented.     Jessy Cloud MD

## 2021-05-10 NOTE — PROGRESS NOTES
Pt admitted to floor from ER via stretcher. Pt alert and oriented. Pt with wife at side. Pt connected to 6L HF NC and continuous monitor per order. Pt oriented to room and bed functions, pt verbalized understanding of all teaching. Pt denies needs. Bed L/L, call bell is within reach and side rails are up x 2.

## 2021-05-10 NOTE — ED TRIAGE NOTES
Pt ambulatory to triage. Reports seen at PCP sent for CXR. Reports pneumothorax and sent to ED. Reports some SOB and some cough. Pt attributed s/s to pollen.  Pt in NAD

## 2021-05-10 NOTE — ED PROVIDER NOTES
73-year-old white male was being evaluated by primary care for 6-month PSA follow-up. At that time he was noted to have abnormal breath sounds. He was sent for outpatient chest x-ray which showed a pneumothorax on the right side. Patient states he has had slight cough and mild shortness of breath which she attributed to seasonal allergies. He denies chest pain. No trauma. The history is provided by the patient. Abnormal Lab Results  Associated symptoms include shortness of breath. Pertinent negatives include no chest pain, no abdominal pain and no headaches.         Past Medical History:   Diagnosis Date    Actinic keratosis     Adverse effect of anesthesia     liver enzymes increased for 1 week following general anesthesia for shoulder sx    Anxiety     Atrial flutter ([address]     s/p ablation 1/7/21    CVA (cerebral vascular accident) ([address] 10/23/2018    numbness remains in R hand    Erectile dysfunction     Former smoker     Quit 2/2018--- 1 ppd x 40 yrs    Hypertension     managed with med    Osteoarthritis     feet--- surg for correction    Pain in joint involving pelvic region and thigh     Rosacea     Wide-complex tachycardia ([address] 01/2021    pt has ILR in place and [place]        Past Surgical History:   Procedure Laterality Date    HX AFIB ABLATION  01/07/2021    pt has loop recorder    HX COLONOSCOPY      HX HIP REPLACEMENT Right 09/03/2020    HX ORTHOPAEDIC Right 03/09/2018    RIGHT FOOT 1ST MTP CHEILECTOMY     HX ORTHOPAEDIC Left 03/2019    elbow surg    HX ROTATOR CUFF REPAIR Left 2013    HX SHOULDER ARTHROSCOPY Right 2011    bicep    [place] ANESTH,SURGERY OF SHOULDER           Family History:   Problem Relation Age of Onset    Heart Disease Mother     Heart Disease Father     Heart Attack Brother         before age 61    Hypertension Brother     Heart Disease Brother        Social History     Socioeconomic History    Marital status:  Spouse name: Not on file    Number of children: Not on file    Years of education: Not on file    Highest education level: Not on file   Occupational History    Occupation: worked at industrial plant destroying chemical solutions/   Social Needs    Financial resource strain: Not on file    Food insecurity     Worry: Not on file     Inability: Not on file   [employer] needs     Medical: Not on file     Non-medical: Not on file   Tobacco Use    Smoking status: Former Smoker     Packs/day: 1.00     Years: 40.00     Pack years: 40.00     Quit date: 2/21/2018     Years since quitting: 3.2    Smokeless tobacco: Never Used   Substance and Sexual Activity    Alcohol use:  Yes     Alcohol/week: 2.0 standard drinks     Types: 2 Glasses of wine per week    Drug use: No    Sexual activity: Not on file   Lifestyle    Physical activity     Days per week: Not on file     Minutes per session: Not on file    Stress: Not on file   Relationships    Social connections     Talks on phone: Not on file     Gets together: Not on file     Attends Hindu service: Not on file     Active member of club or organization: Not on file     Attends meetings of clubs or organizations: Not on file     Relationship status: Not on file    Intimate partner violence     Fear of current or ex partner: Not on file     Emotionally abused: Not on file     Physically abused: Not on file     Forced sexual activity: Not on file   Other Topics Concern     Service Not Asked    Blood Transfusions Not Asked    Caffeine Concern Not Asked     Comment: 4 cups of coffee per day    Occupational Exposure Not Asked   [place] Hazards Not Asked    Sleep Concern Not Asked    Stress Concern Not Asked    Weight Concern Not Asked    Special Diet Not Asked    Back Care Not Asked    Exercise No    Bike Helmet Not Asked    Seat Belt Not Asked    Self-Exams Not Asked   Social History Narrative    Lives with wife ALLERGIES: Patient has no known allergies. Review of Systems   Constitutional: Negative for fever. HENT: Negative for congestion. Respiratory: Positive for cough and shortness of breath. Cardiovascular: Negative for chest pain and leg swelling. Gastrointestinal: Negative for abdominal pain, nausea and vomiting. Genitourinary: Negative for dysuria. Musculoskeletal: Negative for back pain and neck pain. Skin: Negative for rash. Neurological: Negative for headaches. All other systems reviewed and are negative. Vitals:    05/10/21 1359 05/10/21 1600 05/10/21 1632   BP: (!) 153/97 (!) 150/87    Pulse: 89 78 68   Resp: 18     Temp: 98.9 °F (37.2 °C)     SpO2: 95% 97% 100%   Weight: 78.9 kg (174 lb)     Height: 6' (1.829 m)              Physical Exam  Vitals signs and nursing note reviewed. Constitutional:       General: He is not in acute distress. Appearance: Normal appearance. He is not toxic-appearing. HENT:      Head: Normocephalic and atraumatic. Nose: Nose normal.      Mouth/Throat:      Mouth: Mucous membranes are moist.      Pharynx: Oropharynx is clear. Eyes:      Conjunctiva/sclera: Conjunctivae normal.      Pupils: Pupils are equal, round, and reactive to light. Neck:      Musculoskeletal: Normal range of motion and neck supple. Cardiovascular:      Rate and Rhythm: Normal rate. Pulmonary:      Effort: Pulmonary effort is normal.      Comments: Diminished breath sounds in the right base  Abdominal:      General: There is no distension. Palpations: Abdomen is soft. Tenderness: There is no abdominal tenderness. Musculoskeletal: Normal range of motion. General: No swelling or tenderness. Skin:     General: Skin is warm and dry. Neurological:      Mental Status: He is alert and oriented to person, place, and time.    Psychiatric:         Mood and Affect: Mood normal.         Behavior: Behavior normal.          MDM  Number of Diagnoses or Management Options  Diagnosis management comments: Chest x-ray confirms right-sided pneumothorax without tension. Blood work is unremarkable. Pulmonary has been consulted. Due to the patient being on Xarelto pulmonary will admit for observation and will place chest tube once coagulation status is stable.        Amount and/or Complexity of Data Reviewed  Clinical lab tests: ordered and reviewed  Tests in the radiology section of CPT®: ordered and reviewed  Discuss the patient with other providers: yes  Independent visualization of images, tracings, or specimens: yes    Risk of Complications, Morbidity, and/or Mortality  Presenting problems: moderate  Diagnostic procedures: moderate  Management options: moderate           Procedures

## 2021-05-10 NOTE — PROGRESS NOTES
TRANSFER - IN REPORT:    Verbal report received from [facility]  (name) on Tri Cortes  being received from ER (unit) for routine progression of care      Report consisted of patients Situation, Background, Assessment and   Recommendations(SBAR). Information from the following report(s) SBAR, ED Summary, [facility] and Recent Results was reviewed with the receiving nurse. Opportunity for questions and clarification was provided.

## 2021-05-11 ENCOUNTER — APPOINTMENT (OUTPATIENT)
Dept: GENERAL RADIOLOGY | Age: 71
DRG: 201 | End: 2021-05-11
Attending: INTERNAL MEDICINE
Payer: MEDICARE

## 2021-05-11 PROCEDURE — 2709999900 HC NON-CHARGEABLE SUPPLY

## 2021-05-11 PROCEDURE — 74011250637 HC RX REV CODE- 250/637: Performed by: INTERNAL MEDICINE

## 2021-05-11 PROCEDURE — 76040000019: Performed by: INTERNAL MEDICINE

## 2021-05-11 PROCEDURE — 71045 X-RAY EXAM CHEST 1 VIEW: CPT

## 2021-05-11 PROCEDURE — C1729 CATH, DRAINAGE: HCPCS | Performed by: INTERNAL MEDICINE

## 2021-05-11 PROCEDURE — 32551 INSERTION OF CHEST TUBE: CPT | Performed by: INTERNAL MEDICINE

## 2021-05-11 PROCEDURE — 0W9930Z DRAINAGE OF RIGHT PLEURAL CAVITY WITH DRAINAGE DEVICE, PERCUTANEOUS APPROACH: ICD-10-PCS | Performed by: INTERNAL MEDICINE

## 2021-05-11 PROCEDURE — 65660000000 HC RM CCU STEPDOWN

## 2021-05-11 PROCEDURE — 2709999900 HC NON-CHARGEABLE SUPPLY: Performed by: INTERNAL MEDICINE

## 2021-05-11 PROCEDURE — 99232 SBSQ HOSP IP/OBS MODERATE 35: CPT | Performed by: INTERNAL MEDICINE

## 2021-05-11 PROCEDURE — 74011250636 HC RX REV CODE- 250/636: Performed by: INTERNAL MEDICINE

## 2021-05-11 RX ORDER — HYDROMORPHONE HYDROCHLORIDE 1 MG/ML
0.5 INJECTION, SOLUTION INTRAMUSCULAR; INTRAVENOUS; SUBCUTANEOUS
Status: DISCONTINUED | OUTPATIENT
Start: 2021-05-11 | End: 2021-05-15 | Stop reason: HOSPADM

## 2021-05-11 RX ADMIN — Medication 10 ML: at 21:38

## 2021-05-11 RX ADMIN — Medication 10 ML: at 05:42

## 2021-05-11 RX ADMIN — LOSARTAN POTASSIUM 50 MG: 50 TABLET, FILM COATED ORAL at 21:37

## 2021-05-11 RX ADMIN — HYDROMORPHONE HYDROCHLORIDE 0.5 MG: 1 INJECTION, SOLUTION INTRAMUSCULAR; INTRAVENOUS; SUBCUTANEOUS at 19:37

## 2021-05-11 RX ADMIN — HYDROCODONE BITARTRATE AND ACETAMINOPHEN 1 TABLET: 7.5; 325 TABLET ORAL at 15:47

## 2021-05-11 RX ADMIN — METOPROLOL SUCCINATE 25 MG: 25 TABLET, EXTENDED RELEASE ORAL at 17:27

## 2021-05-11 RX ADMIN — HYDROCODONE BITARTRATE AND ACETAMINOPHEN 1 TABLET: 7.5; 325 TABLET ORAL at 11:50

## 2021-05-11 RX ADMIN — HYDROMORPHONE HYDROCHLORIDE 0.5 MG: 1 INJECTION, SOLUTION INTRAMUSCULAR; INTRAVENOUS; SUBCUTANEOUS at 13:58

## 2021-05-11 RX ADMIN — FLUTICASONE PROPIONATE 2 SPRAY: 50 SPRAY, METERED NASAL at 11:45

## 2021-05-11 RX ADMIN — HYDROCODONE BITARTRATE AND ACETAMINOPHEN 1 TABLET: 7.5; 325 TABLET ORAL at 21:59

## 2021-05-11 RX ADMIN — Medication 10 ML: at 13:10

## 2021-05-11 NOTE — PROGRESS NOTES
CM spoke with patient at bedside. Demographics verified. Patient lives in home with spouse. He states he is independent at baseline. Ambulates with cane due to recent hip replacement; has rolling walker and BSC in home. No home oxygen. No home health. Patient plans to return home at discharge. CM will remain available to assist with discharge needs that may arise.      Care Management Interventions  PCP Verified by CM: Yes(Dr. Kennedi Tee)  Mode of Transport at Discharge: Self  Discharge Durable Medical Equipment: No  Physical Therapy Consult: No  Occupational Therapy Consult: No  Current Support Network: Own Home, Lives with Spouse  Confirm Follow Up Transport: Family  Discharge Location  Discharge Placement: Home

## 2021-05-11 NOTE — PROGRESS NOTES
Problem: Falls - Risk of  Goal: *Absence of Falls  Description: Document Junaidsergio Lopez Fall Risk and appropriate interventions in the flowsheet.   Outcome: Progressing Towards Goal  Note: Fall Risk Interventions:            Medication Interventions: Evaluate medications/consider consulting pharmacy, Patient to call before getting OOB, Teach patient to arise slowly

## 2021-05-11 NOTE — PROGRESS NOTES
TRANSFER - IN REPORT:    Verbal report received from Leanna Sebastian RN on Martha Ornelas  being received from [facility] for routine progression of care      Report consisted of patients Situation, Background, Assessment and   Recommendations(SBAR). Information from the following report(s) Procedure Summary and Recent Results was reviewed with the receiving nurse. Opportunity for questions and clarification was provided. Assessment completed upon patients arrival to unit and care assumed.

## 2021-05-11 NOTE — INTERVAL H&P NOTE
Update History & Physical 
 
Date of Surgery Update: 
Abhi Jimenez was seen and examined. History and physical has been reviewed. The patient has been examined.  There have been no significant clinical changes since the completion of the originally dated History and Physical. 
 
Signed By: Reggie Corley MD   
 May 11, 2021 10:29 AM   
  
 
 
Electronically signed by Reggie Corley MD on 5/11/2021 at 10:29 AM

## 2021-05-11 NOTE — PROGRESS NOTES
Martha Ornelas  Admission Date: 5/10/2021             Daily Progress Note: 5/11/2021    The patient's chart is reviewed and the patient is discussed with the staff.  73 y.o.  male seen and evaluated at the request of Dr. Evelyn Smiley. He went to see his PCP for follow up for elevated PSA. He commented to his PCP that he had a been short of breath with an unproductive cough for the past 7 to 10 days. He smoked for 40 years (1ppd) but quit in 2018. He does not use any inhalers and is typically not short of breath. His PCP ordered a CXR that showed a right sided ptx. He takes Xarelto for a history of a fib/flutter. He is followed by Dr. Brunilda Alfonso.         Subjective:      No problems overnight. Discussed the CXR and need for CT insertion. Current Facility-Administered Medications   Medication Dose Route Frequency    fluticasone propionate (FLONASE) 50 mcg/actuation nasal spray 2 Spray  2 Spray Both Nostrils DAILY    losartan (COZAAR) tablet 50 mg  50 mg Oral QHS    metoprolol succinate (TOPROL-XL) XL tablet 25 mg  25 mg Oral QPM    sodium chloride (NS) flush 5-40 mL  5-40 mL IntraVENous Q8H    sodium chloride (NS) flush 5-40 mL  5-40 mL IntraVENous PRN    acetaminophen (TYLENOL) tablet 650 mg  650 mg Oral Q4H PRN    HYDROcodone-acetaminophen (NORCO) 7.5-325 mg per tablet 1 Tab  1 Tab Oral Q4H PRN    bisacodyL (DULCOLAX) suppository 10 mg  10 mg Rectal DAILY PRN         Objective:     Vitals:    05/10/21 2359 05/11/21 0400 05/11/21 0522 05/11/21 0735   BP: (!) 151/80  (!) 156/88 (!) 142/90   Pulse: 79 78 78 90   Resp: 18  18 18   Temp: 98 °F (36.7 °C)  97.8 °F (36.6 °C) 98 °F (36.7 °C)   SpO2: 97%  99% 97%   Weight:       Height:         Intake and Output:   No intake/output data recorded. No intake/output data recorded.     Physical Exam:   Constitutional:  the patient is well developed and in no acute distress  HEENT:  Sclera clear, pupils equal, oral mucosa moist  Lungs: clear bilaterally but decreased on the right. Wearing 6 liter cannula   Cardiovascular:  RRR without M,G,R  Abd/GI: soft and non-tender; with positive bowel sounds. Ext: warm without cyanosis. There is no lower leg edema. Musculoskeletal: moves all four extremities with equal strength  Skin:  no jaundice or rashes, no wounds   Neuro: no gross neuro deficits   Musculoskeletal: can ambulate. No deformity  Psychiatric: Calm. Review of Systems - General ROS: positive for  - weight loss  Respiratory ROS: positive for - cough and shortness of breath  Cardiovascular ROS: no chest pain or dyspnea on exertion  Gastrointestinal ROS: no abdominal pain, change in bowel habits, or black or bloody stools   Lines: peripheral IV    CHEST XRAY:         LAB  Recent Labs     05/10/21  1401   WBC 6.3   HGB 15.1   HCT 45.9      INR 1.2     Recent Labs     05/10/21  1401      K 4.0      CO2 28   GLU 93   BUN 8   CREA 0.69*   ALB 4.0       Assessment:  (Medical Decision Making)     Hospital Problems  Date Reviewed: 5/10/2021          Codes Class Noted POA    * (Principal) Pneumothorax on right ICD-10-CM: J93.9  ICD-9-CM: 512.89  5/10/2021 Yes    persistent    History of tobacco use (Chronic) ICD-10-CM: H98.429  ICD-9-CM: V15.82  5/10/2021 Yes    40 years X 1 ppd - quit several years ago    Chronic anticoagulation (Chronic) ICD-10-CM: Z79.01  ICD-9-CM: V58.61  5/10/2021 Yes    Hx a fib/flutter - on Xarelto          Plan:  (Medical Decision Making)   1. CT insertion today - last dose of Xarelto was Sunday morning  2. Follow up CXRs as ordered for ressessment. Continue oxygen support for now  3. Restart Xarelto when stable    Dre Lancaster NP    More than 50% of time documented was spent face-to-face contact with the patient and in the care of the patient on the floor/unit where the patient is located.      The patient has been seen and examined by me personally, the chart,labs, and radiographic studies have been reviewed. Chest: CTA after CT placement on the R  Extremities: no edema    I agree with the above assessment and plan. Chest tube is to water seal and the lung is completely re expanded with no air leak noted:    Repeat CXR in AM and if lung continues to be up with no air leak- clamp CT x 4h repeat CXR and if still no PTX- remove CT.     Cathie Mathews MD.

## 2021-05-11 NOTE — ROUTINE PROCESS
TRANSFER - OUT REPORT: 
 
Verbal report given to Young Aragon (name) on Tri Cortes  being transferred to  [id number] 00 18 (unit) for routine post - op Report consisted of patients Situation, Background, Assessment and  
Recommendations(SBAR). Information from the following report(s) SBAR was reviewed with the receiving nurse. Lines:  
Peripheral IV 05/10/21 Left Antecubital (Active) Site Assessment Clean, dry, & intact 05/11/21 7602 Phlebitis Assessment 0 05/11/21 0708 Infiltration Assessment 0 05/11/21 0708 Dressing Status Clean, dry, & intact 05/11/21 3470 Dressing Type Tape;Transparent 05/11/21 0708 Hub Color/Line Status Pink;Capped 05/11/21 0708 Action Taken Blood drawn 05/10/21 1401 Opportunity for questions and clarification was provided. Patient transported with: 
 O2 @ 3 liters

## 2021-05-11 NOTE — PROCEDURES
PROCEDURE:    TUBE THORACOSTOMY ON THE R    INDICATION:  Loculated R PTX  ANESTHESIA:   LOCAL ANESTHESIA WITH 10 CC OF 1% LIDOCAINE. DESCRIPTION:  After obtaining informed consent a curved 14F Arrow seldinger technique cavity drainage catheter kit was used to perform the procedure. The skin in the area of the R  6th inter-costal space, in anterior axillary line, was cleansed withchlorhexidine, and then draped in the usual fashion. A 21G 1.5 inch needle was then used to anesthetize the area with 1% lidocaine(10 cc total). The skin, subcutaneous tissue, rib, intercostal muscles were all anesthetized nad entry into miguel pleural space verified by aspiration of air. The depth to penetration of the pleura was noted and then a short introducer needle was used to penetrate the chest.  After assuring correct position by aspiration of air the syringe was removed and a J-wire was inserted into the needle to a depth of 30 cm. An 3mm incision was then made at the needle insertion site to facilitate tissue dilation. The needle was then removed, and the opening dilated with the provided dilator over the guide wire to a depth of 5 cm. Following this the cavity drainage 14F catheter was inserted over the guide wire in the usual fashon, to a depth of 7 cm initially, the trocar was then used as an introducer to further insert the catheter to the depth of the proximal hub and trocar removed. No gush of air to suggest tension was noted upon insertion of the chest tube. Chest tube was secured  in place with provided prolene sutures via the  proximal limiter w/o complication. An Atrium was attached to the chest tube, and suction at -20cm H2O applied. Air was seen gushing through the water seal for obout 30 seconds, after which bubling stopped, the patient complained of chest pain at that point and the continuous suction was discontinued and chest tybe placed to water seal only with no air leak noted.   There was no pleural fluid.    There were no immediate complications, the patient tolerated the procedure well, 4/4 gauze was applied to the insertion site and the whole apparatus affixed securely to the chest with broad silk tape. CX Shows complete reexpansion of the lung.         Cyndi Jaeger MD.

## 2021-05-11 NOTE — ROUTINE PROCESS
Verbal bedside report received from [employer], [facility]. Assumed care of patient.

## 2021-05-12 ENCOUNTER — APPOINTMENT (OUTPATIENT)
Dept: GENERAL RADIOLOGY | Age: 71
DRG: 201 | End: 2021-05-12
Attending: INTERNAL MEDICINE
Payer: MEDICARE

## 2021-05-12 PROCEDURE — 65660000000 HC RM CCU STEPDOWN

## 2021-05-12 PROCEDURE — 74011250637 HC RX REV CODE- 250/637: Performed by: INTERNAL MEDICINE

## 2021-05-12 PROCEDURE — 71045 X-RAY EXAM CHEST 1 VIEW: CPT

## 2021-05-12 PROCEDURE — 74011250636 HC RX REV CODE- 250/636: Performed by: INTERNAL MEDICINE

## 2021-05-12 PROCEDURE — 99232 SBSQ HOSP IP/OBS MODERATE 35: CPT | Performed by: INTERNAL MEDICINE

## 2021-05-12 RX ORDER — HYDROCODONE BITARTRATE AND ACETAMINOPHEN 10; 325 MG/1; MG/1
1 TABLET ORAL
Status: DISCONTINUED | OUTPATIENT
Start: 2021-05-12 | End: 2021-05-15 | Stop reason: HOSPADM

## 2021-05-12 RX ADMIN — LOSARTAN POTASSIUM 50 MG: 50 TABLET, FILM COATED ORAL at 20:30

## 2021-05-12 RX ADMIN — Medication 10 ML: at 05:05

## 2021-05-12 RX ADMIN — Medication 10 ML: at 20:31

## 2021-05-12 RX ADMIN — HYDROMORPHONE HYDROCHLORIDE 0.5 MG: 1 INJECTION, SOLUTION INTRAMUSCULAR; INTRAVENOUS; SUBCUTANEOUS at 04:23

## 2021-05-12 RX ADMIN — HYDROMORPHONE HYDROCHLORIDE 0.5 MG: 1 INJECTION, SOLUTION INTRAMUSCULAR; INTRAVENOUS; SUBCUTANEOUS at 17:32

## 2021-05-12 RX ADMIN — METOPROLOL SUCCINATE 25 MG: 25 TABLET, EXTENDED RELEASE ORAL at 17:21

## 2021-05-12 RX ADMIN — Medication 10 ML: at 14:37

## 2021-05-12 RX ADMIN — HYDROCODONE BITARTRATE AND ACETAMINOPHEN 1 TABLET: 7.5; 325 TABLET ORAL at 09:15

## 2021-05-12 NOTE — PROGRESS NOTES
Comprehensive Nutrition Assessment    Type and Reason for Visit: Initial, Positive nutrition screen  Best Practice Alert for Malnutrition Screening Tool: Recently Lost Weight Without Trying: Yes, If Yes, How Much Weight Loss: 14 - 23 lbs, Eating Poorly Due to Decreased Appetite: Yes    Nutrition Recommendations/Plan:    Continue current diet   Continue Ensure Enlive TID (350 kcal and 20 g pro)     Malnutrition Assessment:  Malnutrition Status: Insufficient data    Nutrition Assessment:   Nutrition History: Patient state that he has been losing weight, but is unsure why. He then goes on to state that his appetite has been poor and he has been \"picking\" at meals. He statets that he still eats 3 times per day, but decreased volume. He states that he usually weighs around 190#, but lost down to high 160s. He states that he has not weight 190# in over a year. He endorses that decreased appetite and weight loss have bene on-going for a long time. He states that he has recently started Ensure at home (within the last month) and has started to regain weight/weight stablize. Nutrition Background: Patient with PMH significant for HLD, HTN, CVA, aflutter s/p ablation. He presented per PCP due to complaining of cough and shortness of breath. PCP had CXR which showed right pneumothorax and patient was admitted and had chest tube placed. Daily Update:  Patient seen with wife at bedside. Observed lunch and breakfast trays with about 25% consumed of each tray. He states that he has been drinking Ensure with each meal and enjoys them. He states that in addition to poor appetite, the pain from the chest tube in inhibiting his intake. Recommended that he try to eat what he can of his meals prior to drinking Ensure and then use Ensure between meals or in the evening as a snack.  Recommended he continues this practice at home and explained rationale for small/frequent meals in the setting of decreased appetite, intake, and weight loss. He voiced understanding. Nutrition Related Findings:   No physical signs of malnutrition      Current Nutrition Therapies:  DIET REGULAR  DIET NUTRITIONAL SUPPLEMENTS All Meals; Ensure Enlive    Current Intake:   Average Meal Intake: 26-50% Average Supplement Intake: 51-75%      Anthropometric Measures:  Height: 6' (182.9 cm)  Current Body Wt: 78.9 kg (173 lb 15.1 oz)(5/10), Weight source: Not specified  BMI: 23.6, Underweight (BMI less than 22) age over 72  Admission Body Weight: 169 lb(5/10 office weight prior to admission)  Ideal Body Weight (lbs) (Calculated): 178 lbs (81 kg), 97.7 %  Usual Body Wt: 78.5 kg (173 lb)(per EMR review for last 6 months, increased from 164# 5/2020), Percent weight change: 0.5          Edema: No data recorded   Estimated Daily Nutrient Needs:  Energy (kcal/day): 4867-1079 (Kcal/kg(20-25), Weight Used: Usual(78.5 kg))  Protein (g/day): 79-99 (1-1.25 g/kg) Weight Used: (Usual)  Fluid (ml/day):   (1 ml/kcal)    Nutrition Diagnosis:   · Inadequate oral intake related to (poor appetite) as evidenced by (patient reported barrier to PO, intake as above)    Nutrition Interventions:   Food and/or Nutrient Delivery: Continue current diet, Continue oral nutrition supplement     Coordination of Nutrition Care: Continue to monitor while inpatient    Goals:       Active Goal: Meet at least 75% nutrition needs within 7 days    Nutrition Monitoring and Evaluation:      Food/Nutrient Intake Outcomes: Food and nutrient intake, Supplement intake       Discharge Planning:    Continue oral nutrition supplement    [address] [place], LD on 5/12/2021 at 2:44 PM  Contact: [phone]

## 2021-05-12 NOTE — PROGRESS NOTES
Jose Phelan  Admission Date: 5/10/2021             Daily Progress Note: 5/12/2021    The patient's chart is reviewed and the patient is discussed with the staff.  73 y.o.  male seen and evaluated at the request of Dr. Xiomara Weaver. He went to see his PCP for follow up for elevated PSA. He commented to his PCP that he had a been short of breath with an unproductive cough for the past 7 to 10 days. He smoked for 40 years (1ppd) but quit in 2018. He does not use any inhalers and is typically not short of breath. His PCP ordered a CXR that showed a right sided ptx. He takes Xarelto for a history of a fib/flutter. He is followed by Dr. Kalyn Lombardo.         Subjective:        Has some pain at chest tube site but states it is well managed. Denies any SOB.     Current Facility-Administered Medications   Medication Dose Route Frequency    HYDROmorphone (DILAUDID) injection 0.5 mg  0.5 mg IntraVENous Q4H PRN    fluticasone propionate (FLONASE) 50 mcg/actuation nasal spray 2 Spray  2 Spray Both Nostrils DAILY    losartan (COZAAR) tablet 50 mg  50 mg Oral QHS    metoprolol succinate (TOPROL-XL) XL tablet 25 mg  25 mg Oral QPM    sodium chloride (NS) flush 5-40 mL  5-40 mL IntraVENous Q8H    sodium chloride (NS) flush 5-40 mL  5-40 mL IntraVENous PRN    acetaminophen (TYLENOL) tablet 650 mg  650 mg Oral Q4H PRN    HYDROcodone-acetaminophen (NORCO) 7.5-325 mg per tablet 1 Tab  1 Tab Oral Q4H PRN    bisacodyL (DULCOLAX) suppository 10 mg  10 mg Rectal DAILY PRN         Objective:     Vitals:    05/11/21 2004 05/12/21 0051 05/12/21 0454 05/12/21 0729   BP: 115/74 135/84 (!) 141/66 135/85   Pulse: 75 65 75 67   Resp: 18 18 19 19   Temp: 98.2 °F (36.8 °C) 97.9 °F (36.6 °C) 97.8 °F (36.6 °C) 97.7 °F (36.5 °C)   SpO2: 98% 99% 97% 96%   Weight:       Height:         Intake and Output:   05/10 1901 - 05/12 0700  In: 200 [P.O.:200]  Out: 500 [Urine:500]  No intake/output data recorded. Physical Exam:   Constitutional:  the patient is well developed and in no acute distress  HEENT:  Sclera clear, pupils equal, oral mucosa moist  Lungs: clear, on 3L NC  Cardiovascular:  RRR without M,G,R  Abd/GI: soft and non-tender; with positive bowel sounds. Ext: warm without cyanosis. There is no lower leg edema. Musculoskeletal: moves all four extremities with equal strength  Skin:  no jaundice or rashes, no wounds   Neuro: no gross neuro deficits   Musculoskeletal: can ambulate. No deformity  Psychiatric: Calm. Review of Systems   Review of Systems - Negative except pain at chest tube site     Lines: peripheral IV    CHEST XRAY 5/12:         LAB  Recent Labs     05/10/21  1401   WBC 6.3   HGB 15.1   HCT 45.9      INR 1.2     Recent Labs     05/10/21  1401 05/10/21  1047    137   K 4.0 4.5    103   CO2 28 21   GLU 93 92   BUN 8 7*   CREA 0.69* 0.71*   ALB 4.0 4.4       Assessment:  (Medical Decision Making)     Hospital Problems  Date Reviewed: 5/10/2021          Codes Class Noted POA    * (Principal) Pneumothorax on right ICD-10-CM: J93.9  ICD-9-CM: 512.89  5/10/2021 Yes    Chest tube placed 5/11    History of tobacco use (Chronic) ICD-10-CM: V41.489  ICD-9-CM: V15.82  5/10/2021 Yes    40 years X 1 ppd - quit several years ago    Chronic anticoagulation (Chronic) ICD-10-CM: Z79.01  ICD-9-CM: V58.61  5/10/2021 Yes    Hx a fib/flutter - on Xarelto          Plan:  (Medical Decision Making)     -- Continue O2 for now. -- Chest tube with 180 out since yesterday. Currently to water seal- has small air leak with cough. -- resume Xarelto when stable      Mario Alberto Prom, NP    More than 50% of time documented was spent face-to-face contact with the patient and in the care of the patient on the floor/unit where the patient is located. Lungs:  Decreased BS; small air leak from CT  Heart:  RRR with no Murmur/Rubs/Gallops    Additional Comments:  Some chest soreness.  Still with air leak. Continue CT to suction and check F/U CXR in AM. Increased Norco to 10 mg q6 h prn. I have spoken with and examined the patient. I agree with the above assessment and plan as documented.     Nohemi Harden MD

## 2021-05-13 ENCOUNTER — APPOINTMENT (OUTPATIENT)
Dept: GENERAL RADIOLOGY | Age: 71
DRG: 201 | End: 2021-05-13
Attending: STUDENT IN AN ORGANIZED HEALTH CARE EDUCATION/TRAINING PROGRAM
Payer: MEDICARE

## 2021-05-13 ENCOUNTER — APPOINTMENT (OUTPATIENT)
Dept: CT IMAGING | Age: 71
DRG: 201 | End: 2021-05-13
Attending: INTERNAL MEDICINE
Payer: MEDICARE

## 2021-05-13 PROCEDURE — 65660000000 HC RM CCU STEPDOWN

## 2021-05-13 PROCEDURE — 2709999900 HC NON-CHARGEABLE SUPPLY

## 2021-05-13 PROCEDURE — 74011250637 HC RX REV CODE- 250/637: Performed by: INTERNAL MEDICINE

## 2021-05-13 PROCEDURE — 71250 CT THORAX DX C-: CPT

## 2021-05-13 PROCEDURE — 99232 SBSQ HOSP IP/OBS MODERATE 35: CPT | Performed by: INTERNAL MEDICINE

## 2021-05-13 PROCEDURE — 71045 X-RAY EXAM CHEST 1 VIEW: CPT

## 2021-05-13 RX ADMIN — HYDROCODONE BITARTRATE AND ACETAMINOPHEN 1 TABLET: 10; 325 TABLET ORAL at 16:18

## 2021-05-13 RX ADMIN — HYDROCODONE BITARTRATE AND ACETAMINOPHEN 1 TABLET: 10; 325 TABLET ORAL at 10:21

## 2021-05-13 RX ADMIN — Medication 10 ML: at 05:04

## 2021-05-13 RX ADMIN — Medication 10 ML: at 14:17

## 2021-05-13 RX ADMIN — Medication 10 ML: at 20:09

## 2021-05-13 RX ADMIN — METOPROLOL SUCCINATE 25 MG: 25 TABLET, EXTENDED RELEASE ORAL at 17:54

## 2021-05-13 RX ADMIN — LOSARTAN POTASSIUM 50 MG: 50 TABLET, FILM COATED ORAL at 20:09

## 2021-05-13 NOTE — PROGRESS NOTES
Did not sleep much. 02 6 liter. 5 ml out from CT. CXR done. Hourly rounds in progress. Resting in bed. Denies needs at this time. Bed in low locked position. Call light and personal items within reach. Will continue to monitor and give report to oncoming day shift nurse.

## 2021-05-13 NOTE — PROGRESS NOTES
Tri Cortes  Admission Date: 5/10/2021             Daily Progress Note: 5/13/2021    The patient's chart is reviewed and the patient is discussed with the staff.  73 y.o.  male seen and evaluated at the request of Dr. An Williamson. He went to see his PCP for follow up for elevated PSA. He commented to his PCP that he had a been short of breath with an unproductive cough for the past 7 to 10 days. He smoked for 40 years (1ppd) but quit in 2018. He does not use any inhalers and is typically not short of breath. His PCP ordered a CXR that showed a right sided ptx. He takes Xarelto for a history of a fib/flutter. He is followed by Dr. Rex Holland.         Subjective:        Patient sitting up on side of bed. Denies any pain or SOB.  Chest tube still having occasional airleak and airleak with cough; currently to water seal.    Current Facility-Administered Medications   Medication Dose Route Frequency    HYDROcodone-acetaminophen (NORCO)  mg tablet 1 Tab  1 Tab Oral Q6H PRN    HYDROmorphone (DILAUDID) injection 0.5 mg  0.5 mg IntraVENous Q4H PRN    fluticasone propionate (FLONASE) 50 mcg/actuation nasal spray 2 Spray  2 Spray Both Nostrils DAILY    losartan (COZAAR) tablet 50 mg  50 mg Oral QHS    metoprolol succinate (TOPROL-XL) XL tablet 25 mg  25 mg Oral QPM    sodium chloride (NS) flush 5-40 mL  5-40 mL IntraVENous Q8H    sodium chloride (NS) flush 5-40 mL  5-40 mL IntraVENous PRN    bisacodyL (DULCOLAX) suppository 10 mg  10 mg Rectal DAILY PRN         Objective:     Vitals:    05/12/21 1922 05/12/21 2339 05/13/21 0335 05/13/21 0726   BP: 132/77 (!) 161/82 (!) 163/91 (!) 150/86   Pulse: 77 77 83 80   Resp: 18 18 18 18   Temp: 98 °F (36.7 °C) 98.2 °F (36.8 °C) 98.2 °F (36.8 °C) 98.5 °F (36.9 °C)   SpO2: 100% 100% 100% 99%   Weight:       Height:         Intake and Output:   05/11 1901 - 05/13 0700  In: 1200 [P.O.:1200]  Out: 1655 [Urine:1650]  No intake/output data recorded. Physical Exam:   Constitutional:  the patient is well developed and in no acute distress  HEENT:  Sclera clear, pupils equal, oral mucosa moist  Lungs: clear, on 6L NC for nitrogen washout  Cardiovascular:  RRR without M,G,R  Abd/GI: soft and non-tender; with positive bowel sounds. Ext: warm without cyanosis. There is no lower leg edema. Musculoskeletal: moves all four extremities with equal strength  Skin:  no jaundice or rashes, no wounds   Neuro: no gross neuro deficits   Musculoskeletal: can ambulate. No deformity  Psychiatric: Calm. Review of Systems   Review of Systems - General ROS: negative  Respiratory ROS: no cough, shortness of breath, or wheezing  Cardiovascular ROS: no chest pain or dyspnea on exertion     Lines: peripheral IV    CHEST XRAY 5/13:         LAB  Recent Labs     05/10/21  1401   WBC 6.3   HGB 15.1   HCT 45.9      INR 1.2     Recent Labs     05/10/21  1401 05/10/21  1047    137   K 4.0 4.5    103   CO2 28 21   GLU 93 92   BUN 8 7*   CREA 0.69* 0.71*   ALB 4.0 4.4       Assessment:  (Medical Decision Making)     Hospital Problems  Date Reviewed: 5/10/2021          Codes Class Noted POA    * (Principal) Pneumothorax on right ICD-10-CM: J93.9  ICD-9-CM: 512.89  5/10/2021 Yes    Chest tube placed 5/11    History of tobacco use (Chronic) ICD-10-CM: T98.037  ICD-9-CM: V15.82  5/10/2021 Yes    40 years X 1 ppd - quit several years ago    Chronic anticoagulation (Chronic) ICD-10-CM: Z79.01  ICD-9-CM: V58.61  5/10/2021 Yes    Hx a fib/flutter - on Xarelto          Plan:  (Medical Decision Making)     -- Continue O2 for now, wean as tolerated.   -- Chest tube currently to water seal- still having air leak with cough and occasional with conversation  -- Place to suction and f/u CXR in AM  -- resume Xarelto when stable    Soren Singleton NP    More than 50% of time documented was spent face-to-face contact with the patient and in the care of the patient on the floor/unit where the patient is located. Lungs:  Mild rales in R base, 6L NC, sats 99%  Heart:  RRR with no Murmur/Rubs/Gallops    Additional Comments:  Can wean O2 as tolerated. Continue chest tube to suction with ongoing air leak. Will check CT with RLL infiltrates and unsure degree of underlying structural lung disease with spontaneous PTX and therefore risk of recurrence. I have spoken with and examined the patient. I agree with the above assessment and plan as documented.     Edwin Bray MD

## 2021-05-14 ENCOUNTER — APPOINTMENT (OUTPATIENT)
Dept: GENERAL RADIOLOGY | Age: 71
DRG: 201 | End: 2021-05-14
Attending: STUDENT IN AN ORGANIZED HEALTH CARE EDUCATION/TRAINING PROGRAM
Payer: MEDICARE

## 2021-05-14 PROCEDURE — 65660000000 HC RM CCU STEPDOWN

## 2021-05-14 PROCEDURE — 74011250637 HC RX REV CODE- 250/637: Performed by: INTERNAL MEDICINE

## 2021-05-14 PROCEDURE — 77010033678 HC OXYGEN DAILY

## 2021-05-14 PROCEDURE — 71045 X-RAY EXAM CHEST 1 VIEW: CPT

## 2021-05-14 PROCEDURE — 94760 N-INVAS EAR/PLS OXIMETRY 1: CPT

## 2021-05-14 PROCEDURE — 71046 X-RAY EXAM CHEST 2 VIEWS: CPT

## 2021-05-14 PROCEDURE — 99232 SBSQ HOSP IP/OBS MODERATE 35: CPT | Performed by: INTERNAL MEDICINE

## 2021-05-14 RX ADMIN — METOPROLOL SUCCINATE 25 MG: 25 TABLET, EXTENDED RELEASE ORAL at 18:00

## 2021-05-14 RX ADMIN — LOSARTAN POTASSIUM 50 MG: 50 TABLET, FILM COATED ORAL at 21:36

## 2021-05-14 RX ADMIN — Medication 10 ML: at 21:37

## 2021-05-14 RX ADMIN — Medication 10 ML: at 05:14

## 2021-05-14 RX ADMIN — FLUTICASONE PROPIONATE 2 SPRAY: 50 SPRAY, METERED NASAL at 09:19

## 2021-05-14 RX ADMIN — Medication 10 ML: at 15:48

## 2021-05-14 RX ADMIN — HYDROCODONE BITARTRATE AND ACETAMINOPHEN 1 TABLET: 10; 325 TABLET ORAL at 03:36

## 2021-05-14 NOTE — PROGRESS NOTES
Rounding done every hour and PRN while patient in room. Patient resting in room. No signs or symptoms of distress. No changes in status. Patient denies any further needs or pain at this time. CT remains clamped per NP. Patient has not had any pain. Or difficulty breathing. Scant amount of fluid in tubing close to patient.

## 2021-05-14 NOTE — PROGRESS NOTES
CM chart review. Currently 6L oxygen; chest tube in place. CM will follow to assist with discharge needs that may arise.

## 2021-05-14 NOTE — PROGRESS NOTES
Annamaria Goldstein  Admission Date: 5/10/2021             Daily Progress Note: 5/14/2021    The patient's chart is reviewed and the patient is discussed with the staff.  73 y.o.  male seen and evaluated at the request of Dr. Anum Ray. He went to see his PCP for follow up for elevated PSA. He commented to his PCP that he had a been short of breath with an unproductive cough for the past 7 to 10 days. He smoked for 40 years (1ppd) but quit in 2018. He does not use any inhalers and is typically not short of breath. His PCP ordered a CXR that showed a right sided ptx. He takes Xarelto for a history of a fib/flutter. He is followed by Dr. Marly Gonzales.         Subjective:        Patient sitting up in bed. Denies any SOB or KRISHNAN. He walked yesterday without complaints. He is on 6L NC and chest tube to suction. No airleak seen at rest or with coughing.     Current Facility-Administered Medications   Medication Dose Route Frequency    HYDROcodone-acetaminophen (NORCO)  mg tablet 1 Tab  1 Tab Oral Q6H PRN    HYDROmorphone (DILAUDID) injection 0.5 mg  0.5 mg IntraVENous Q4H PRN    fluticasone propionate (FLONASE) 50 mcg/actuation nasal spray 2 Spray  2 Spray Both Nostrils DAILY    losartan (COZAAR) tablet 50 mg  50 mg Oral QHS    metoprolol succinate (TOPROL-XL) XL tablet 25 mg  25 mg Oral QPM    sodium chloride (NS) flush 5-40 mL  5-40 mL IntraVENous Q8H    sodium chloride (NS) flush 5-40 mL  5-40 mL IntraVENous PRN    bisacodyL (DULCOLAX) suppository 10 mg  10 mg Rectal DAILY PRN         Objective:     Vitals:    05/13/21 2130 05/14/21 0041 05/14/21 0412 05/14/21 0757   BP:  130/75 131/81 128/73   Pulse:  73 83 74   Resp:  18 18 18   Temp:  97.9 °F (36.6 °C) 98.2 °F (36.8 °C) 97.8 °F (36.6 °C)   SpO2: 98% 98% 98% 97%   Weight:       Height:         Intake and Output:   05/12 1901 - 05/14 0700  In: 2629 [P.O.:1550]  Out: 2080 [Urine:2075]  No intake/output data recorded. Physical Exam:   Constitutional:  the patient is well developed and in no acute distress  HEENT:  Sclera clear, pupils equal, oral mucosa moist  Lungs: clear on 6L NC weaned to 3L NC  Cardiovascular:  RRR without M,G,R  Abd/GI: soft and non-tender; with positive bowel sounds. Ext: warm without cyanosis. There is no lower leg edema. Musculoskeletal: moves all four extremities with equal strength  Skin:  no jaundice or rashes, no wounds   Neuro: no gross neuro deficits   Musculoskeletal: can ambulate. No deformity  Psychiatric: Calm. Review of Systems   Review of Systems - General ROS: negative  Respiratory ROS: no cough, shortness of breath, or wheezing  Cardiovascular ROS: no chest pain or dyspnea on exertion     Lines: peripheral IV    CHEST XRAY 5/14:       CT Chest         FINDINGS:     LUNGS/PLEURA: Central airways are patent. There is a right pleural chest tube  terminating at the anterior base where there is a very small residual  pneumothorax. There are peripheral and basilar predominant groundglass and  interstitial opacities throughout the right lung with an area of consolidation  medially in the right inferior lower lobe. There are a few scattered thin-walled  cysts in both lungs. The left lung is free of consolidation.     MEDIASTINUM:Thyroid is normal. The thoracic esophagus is unremarkable. The heart  is normal in size without pericardial effusion. The great vessels are normal in  caliber with moderate the calcifications in the thoracic aorta and coronary  arteries. Mitral annular calcifications. No mediastinal or hilar  lymphadenopathy.     BONES AND SOFT TISSUES: No axillary adenopathy. There is soft tissue emphysema  in the right axilla and chest wall. No acute or aggressive osseous abnormality.     UPPER ABDOMEN: Left adrenal nodule measuring 2.3 cm with low-density appearance  compatible with lipid rich adenoma.        IMPRESSION  1.  Trace residual anterior basal pneumothorax with chest tube terminating  anteriorly. 2. Small amount of right chest wall and axillary soft tissue emphysema. 3. Scattered peripheral predominant groundglass opacities with a few areas of consolidation in the right lung favored to represent an infectious or  inflammatory process. 4. Atherosclerosis including coronary artery involvement.     LAB  None    Assessment:  (Medical Decision Making)     Hospital Problems  Date Reviewed: 5/10/2021          Codes Class Noted POA    * (Principal) Pneumothorax on right ICD-10-CM: J93.9  ICD-9-CM: 512.89  5/10/2021 Yes    Chest tube placed 5/11    History of tobacco use (Chronic) ICD-10-CM: V79.611  ICD-9-CM: V15.82  5/10/2021 Yes    40 years X 1 ppd - quit several years ago    Chronic anticoagulation (Chronic) ICD-10-CM: Z79.01  ICD-9-CM: V58.61  5/10/2021 Yes    Hx a fib/flutter - on Xarelto          Plan:  (Medical Decision Making)     -- Continue O2 for now, wean as tolerated. Weaned to 3L NC currently. -- Chest tube with no airleak- removed from suction and clamped, will get repeat CXR in 4 hours. -- CT chest with scattered peripheral predominant GGO, few scattered thin-walled cysts in both lungs  -- resume Xarelto when stable    Emre Currie NP    More than 50% of time documented was spent face-to-face contact with the patient and in the care of the patient on the floor/unit where the patient is located. Lungs:  Clear   Heart:  RRR with no Murmur/Rubs/Gallops    Additional Comments:  CT  Clamped , CXR ordered for later today     I have spoken with and examined the patient. I agree with the above assessment and plan as documented.     Tracy Beck MD

## 2021-05-14 NOTE — PROGRESS NOTES
Problem: Falls - Risk of  Goal: *Absence of Falls  Description: Document Suraj Mercado Fall Risk and appropriate interventions in the flowsheet.   Outcome: Progressing Towards Goal  Note: Fall Risk Interventions:            Medication Interventions: Patient to call before getting OOB                   Problem: Patient Education: Go to Patient Education Activity  Goal: Patient/Family Education  Outcome: Progressing Towards Goal     Problem: Breathing Pattern - Ineffective  Goal: *Absence of hypoxia  Outcome: Progressing Towards Goal  Goal: *Use of effective breathing techniques  Outcome: Progressing Towards Goal     Problem: Patient Education: Go to Patient Education Activity  Goal: Patient/Family Education  Outcome: Progressing Towards Goal     Problem: Pain  Goal: *Control of Pain  Outcome: Progressing Towards Goal     Problem: Patient Education: Go to Patient Education Activity  Goal: Patient/Family Education  Outcome: Progressing Towards Goal

## 2021-05-14 NOTE — PROGRESS NOTES
Moves self well. Medicated for chest tube pain x 1. Chest tube to water seal and sux. No bubbles seen at rest.  For cxr this am.  On 6 liter 02. Hourly rounds in progress. Resting in bed. Denies needs or pain at this time. Bed in low locked position. Call light and personal items within reach. Will continue to monitor and give report to oncoming day shift nurse. Adama Giordano

## 2021-05-15 ENCOUNTER — APPOINTMENT (OUTPATIENT)
Dept: GENERAL RADIOLOGY | Age: 71
DRG: 201 | End: 2021-05-15
Attending: NURSE PRACTITIONER
Payer: MEDICARE

## 2021-05-15 VITALS
WEIGHT: 174 LBS | BODY MASS INDEX: 23.57 KG/M2 | SYSTOLIC BLOOD PRESSURE: 157 MMHG | OXYGEN SATURATION: 96 % | TEMPERATURE: 97.5 F | RESPIRATION RATE: 18 BRPM | HEIGHT: 72 IN | HEART RATE: 79 BPM | DIASTOLIC BLOOD PRESSURE: 88 MMHG

## 2021-05-15 PROCEDURE — 74011250637 HC RX REV CODE- 250/637: Performed by: INTERNAL MEDICINE

## 2021-05-15 PROCEDURE — 71045 X-RAY EXAM CHEST 1 VIEW: CPT

## 2021-05-15 PROCEDURE — 2709999900 HC NON-CHARGEABLE SUPPLY

## 2021-05-15 RX ADMIN — Medication 10 ML: at 05:05

## 2021-05-15 RX ADMIN — HYDROCODONE BITARTRATE AND ACETAMINOPHEN 1 TABLET: 10; 325 TABLET ORAL at 02:53

## 2021-05-15 NOTE — PROGRESS NOTES
Hourly rounding completed. Complained of low back pain to left side, medicated once with verbalized relief. Chest tube remains clamped, no difficulty of breathing noted. Chest Xray done this AM, and pt is eager to go home today. All needs met at this time, call light within reach. Will give report to oncoming RN.

## 2021-05-15 NOTE — DISCHARGE SUMMARY
Discharge Note    Patient: Regla Mercado                 MRN: [id number]                      YOB: 1950   Age: 73 y.o. Sex: male                             Admission date:  5/10/2021  Discharge date:  5/15/2021    Admitting Diagnosis:  Pneumothorax on right [J93.9]    Discharge Diagnoses:    Hospital Problems as of 5/15/2021 Date Reviewed: 5/10/2021          Codes Class Noted - Resolved POA    * (Principal) Pneumothorax on right ICD-10-CM: J93.9  ICD-9-CM: 512.89  5/10/2021 - Present Yes        History of tobacco use (Chronic) ICD-10-CM: U51.540  ICD-9-CM: V15.82  5/10/2021 - Present Yes        Chronic anticoagulation (Chronic) ICD-10-CM: Z79.01  ICD-9-CM: V58.61  5/10/2021 - Present Yes              Consultants: none    Studies/Procedures:     Multiple CXRs  CT chest    Procedure(s):  CHEST TUBES INSERTION ROOM 611  ULTRASOUND      Disposition: Home  Dicharged Condition: improved    Hospital course:  Admitted through the ER with a right sided spontaneous pneumothorax. A chest tube was placed with re expansion of the lung noted. He initially had a small leak that resolved. The tube was then clamped for almost 24 hours and serial xrays obtained that showed no recurrence. The chest tube was removed on the day of discharge and vaseline dressing with tegaderm applied. Discharge Medications:   Current Discharge Medication List      CONTINUE these medications which have NOT CHANGED    Details   HYDROcodone-acetaminophen (NORCO)  mg tablet Take 1 Tab by mouth. fluticasone propionate (FLONASE) 50 mcg/actuation nasal spray 2 Sprays by Both Nostrils route daily. Qty: 1 Bottle, Refills: 0  Start date: 5/10/2021      rivaroxaban (XARELTO) 20 mg tab tablet Take 1 Tab by mouth daily (with dinner). HOLD XARELTO UNTIL SIDDHARTHA 3/28/21. MAY DISCONTINE ASPIRIN AND RESUME THEN, IF NO WOUND DRAINAGE.   Qty: 1 Tab, Refills: 0      metoprolol succinate (TOPROL-XL) 50 mg XL tablet Take 1 Tab by mouth daily. Qty: 90 Tab, Refills: 1      tadalafiL (CIALIS) 20 mg tablet TAKE 1 TABLET AS NEEDED FOR ERECTILE DYSFUNCTION  Qty: 18 Tab, Refills: 4    Associated Diagnoses: Erectile dysfunction, unspecified erectile dysfunction type      losartan (COZAAR) 50 mg tablet Take 1 Tab by mouth nightly. Indications: high blood pressure  Qty: 90 Tab, Refills: 3      ergocalciferol (ERGOCALCIFEROL) 1,250 mcg (50,000 unit) capsule Day 1 and 15 of each month  Qty: 6 Cap, Refills: 3    Associated Diagnoses: Vitamin D deficiency      melatonin 3 mg tablet Take 3 mg by mouth nightly. Followup/Outpt Studies:  Activity: No lifting or Strenuous exercise for 2 weeks  Diet: Regular Diet  Wound Care: Keep wound clean and dry - may shower    Follow-up Information     Follow up With Specialties Details Why Contact Info    Donald Reid MD Internal Medicine   [id number] [place]  [phone]      [address] Orthopedic Surgery   [address] [place] [address],[address] [place] [address] Rd  [phone]            --Follow up appointment with [facility] Pulmonary prn, return to ER if short of breath      Wily Friday, NP  May 15, 2021    --Total discharge greater than 30 minutes in duration. More than 50% of time documented was spent face-to-face contact with the patient and in the care of the patient on the floor/unit where the patient is located    Lungs: CTA b/l. No wheezing. Chest drain noted with no air leak  Heart S1 and S2 audible, no murmers or rubs appreciated  Other     cxr noted with lung up. Will remove Chest tube and can go home. Aware not lift more than 5 lbs for now, then every week advance by 5 lbs as tolerated. Laxatives if constipation -- want to avoid straining. Told patient that is more short of breath to just come to the ER. He is aware of all of the above. I have spoken with and examined the patient.  I have reviewed the history, examination, assessment, and plan and agree with the above. Josephine Guadarrama MD      This note was signed electronically. Errors are unfortunately her likely due to dictation software.

## 2021-05-15 NOTE — PROGRESS NOTES
Pt is discharging home in stable condition. No d/c needs identified. Tx goals met. Care Management Interventions  PCP Verified by CM: Yes  Mode of Transport at Discharge:  Other (see comment)(Family)  Transition of Care Consult (CM Consult): Discharge Planning  Discharge Durable Medical Equipment: No  Physical Therapy Consult: No  Occupational Therapy Consult: No  Speech Therapy Consult: No  Current Support Network: Family Lives [place], Own Home  Confirm Follow Up Transport: Self  The Plan for Transition of Care is Related to the Following Treatment Goals : Return home and back to baseline  The Patient and/or Patient Representative was Provided with a Choice of Provider and Agrees with the Discharge Plan?: Yes  Name of the Patient Representative Who was Provided with a Choice of Provider and Agrees with the Discharge Plan: Patient  Freedom of Choice List was Provided with Basic Dialogue that Supports the Patient's Individualized Plan of Care/Goals, Treatment Preferences and Shares the Quality Data Associated with the Providers?: Yes  [place] Resource Information Provided?: No  Discharge Location  Discharge Placement: Home

## 2021-05-15 NOTE — DISCHARGE INSTRUCTIONS
Patient Education        Chest Tube Removal: What to Expect at [facility]     A chest tube is placed through the chest wall between two ribs. You may have had a chest tube put in to help your collapsed lung expand. Or the tube may have helped drain fluid from a chest infection or surgery. The tube was removed before you came home. You may have some pain in your chest from the cut (incision) where the tube was put in. For most people, the pain goes away after about 2 weeks. You will have a bandage taped over the wound. Your doctor will remove the bandage and examine the wound in about 2 days. It will take about 3 to 4 weeks for your incision to heal completely. It may leave a small scar that will fade with time. This care sheet gives you a general idea about how long it will take for you to recover. But each person recovers at a different pace. Follow the steps below to feel better as quickly as possible. How can you care for yourself at home? Activity    · Rest when you feel tired. Getting enough sleep will help you recover.     · Try to walk each day. Start by walking a little more than you did the day before. Bit by bit, increase the amount you walk. Walking boosts blood flow and helps prevent pneumonia and constipation.     · Avoid strenuous activities, such as bicycle riding, jogging, weight lifting, or aerobic exercise, until your doctor says it is okay.     · How soon you can return to work or your normal routine depends on what health problems you have. Talk with your doctor about how long it will take you to recover.     · You may shower after your bandage is removed. Pat the cut (incision) dry. Do not take a bath for 2 weeks after your chest tube is out, or until your doctor tells you it is okay.     · Practice deep breathing exercises as directed by your doctor. Coughing exercises also can help drain fluid out of your chest.   Diet    · You can eat your normal diet.  If your stomach is upset, try bland, low-fat foods like plain rice, broiled chicken, toast, and yogurt.     · Drink plenty of fluids (unless your doctor tells you not to). Medicines    · Your doctor will tell you if and when you can restart your medicines. You will also get instructions about taking any new medicines.     · If you take aspirin or some other blood thinner, ask your doctor if and when to start taking it again. Make sure that you understand exactly what your doctor wants you to do.     · Be safe with medicines. Take pain medicines exactly as directed. ? If the doctor gave you a prescription medicine for pain, take it as prescribed. ? If you are not taking a prescription pain medicine, ask your doctor if you can take an over-the-counter medicine.     · Take your antibiotics as directed. Do not stop taking them just because you feel better. You need to take the full course of antibiotics. Incision care    · Keep the incision dry as it heals. You will have a bandage over it to help the incision heal and protect it. Your doctor will tell you how to take care of this. Other instructions    · Do not smoke. Smoking makes lung problems worse. If you need help quitting, talk to your doctor about stop-smoking programs and medicines. These can increase your chances of quitting for good. Follow-up care is a key part of your treatment and safety. Be sure to make and go to all appointments, and call your doctor if you are having problems. It's also a good idea to know your test results and keep a list of the medicines you take. When should you call for help? Call 911 anytime you think you may need emergency care. For example, call if:    · You passed out (lost consciousness).     · You have severe trouble breathing.     · You have sudden chest pain and shortness of breath, or you cough up blood.    Call your doctor now or seek immediate medical care if:    · You have trouble breathing.     · Your shortness of breath is getting worse.     · Bright red blood soaks through the bandage over your incision.     · You have a fever. Watch closely for any changes in your health, and be sure to contact your doctor if:    · You do not get better as expected. Where can you learn more? Go to http://www.gray.com/  Enter U748 in the search box to learn more about \"Chest Tube Removal: What to Expect at Home. \"  Current as of: October 26, 2020               Content Version: 12.8  © 2006-2021 [employer], [employer]. Care instructions adapted under license by [employer] (which disclaims liability or warranty for this information). If you have questions about a medical condition or this instruction, always ask your healthcare professional. [address] any warranty or liability for your use of this information.

## 2021-05-15 NOTE — PROGRESS NOTES
I have reviewed discharge instructions with the patient. The patient verbalized understanding. Patient will call when wife is here to pick him up.

## 2021-05-15 NOTE — PROGRESS NOTES
Bhakti Syed  Admission Date: 5/10/2021             Daily Progress Note: 5/15/2021    The patient's chart is reviewed and the patient is discussed with the staff.  73 y.o.  male seen and evaluated at the request of Dr. Christen Márquez. He went to see his PCP for follow up for elevated PSA. He commented to his PCP that he had a been short of breath with an unproductive cough for the past 7 to 10 days. He smoked for 40 years (1ppd) but quit in 2018. He does not use any inhalers and is typically not short of breath. His PCP ordered a CXR that showed a right sided ptx. He takes Xarelto for a history of a fib/flutter. He is followed by Dr. Burgess Dominguez.         Subjective:       Chest tube clamped since yesterday. Denies dyspnea. Discussed CXR and plan to remove tube. Current Facility-Administered Medications   Medication Dose Route Frequency    HYDROcodone-acetaminophen (NORCO)  mg tablet 1 Tab  1 Tab Oral Q6H PRN    HYDROmorphone (DILAUDID) injection 0.5 mg  0.5 mg IntraVENous Q4H PRN    fluticasone propionate (FLONASE) 50 mcg/actuation nasal spray 2 Spray  2 Spray Both Nostrils DAILY    losartan (COZAAR) tablet 50 mg  50 mg Oral QHS    metoprolol succinate (TOPROL-XL) XL tablet 25 mg  25 mg Oral QPM    sodium chloride (NS) flush 5-40 mL  5-40 mL IntraVENous Q8H    sodium chloride (NS) flush 5-40 mL  5-40 mL IntraVENous PRN    bisacodyL (DULCOLAX) suppository 10 mg  10 mg Rectal DAILY PRN         Objective:     Vitals:    05/14/21 1139 05/14/21 1534 05/14/21 2003 05/15/21 0245   BP: 130/76 136/81 (!) 144/90 (!) 136/91   Pulse: 79  84 72   Resp: 18 18 18 18   Temp: 97.9 °F (36.6 °C) 98.1 °F (36.7 °C) 98 °F (36.7 °C) 97.5 °F (36.4 °C)   SpO2: 97% 96% 97% 97%   Weight:       Height:         Intake and Output:   05/13 1901 - 05/15 0700  In: 670 [P.O.:670]  Out: 2275 [Urine:2275]  No intake/output data recorded.     Physical Exam:   Constitutional:  the patient is well developed and in no acute distress  HEENT:  Sclera clear, pupils equal, oral mucosa moist  Lungs: clear bilaterally. On 3L NC   Cardiovascular:  RRR without M,G,R  Abd/GI: soft and non-tender; with positive bowel sounds. Ext: warm without cyanosis. There is no lower leg edema. Musculoskeletal: moves all four extremities with equal strength  Skin:  no jaundice or rashes, no wounds   Neuro: no gross neuro deficits   Musculoskeletal: can ambulate. No deformity  Psychiatric: Calm. Review of Systems   Review of Systems - General ROS: hx weight loss/some decrease in appetite over time  Respiratory ROS: no cough, shortness of breath, or wheezing  Cardiovascular ROS: no chest pain or dyspnea on exertion     Lines: peripheral IV    CHEST XRAY 5/14:       CT Chest              IMPRESSION  1. Trace residual anterior basal pneumothorax with chest tube terminating  anteriorly. 2. Small amount of right chest wall and axillary soft tissue emphysema. 3. Scattered peripheral predominant groundglass opacities with a few areas of consolidation in the right lung favored to represent an infectious or  inflammatory process. 4. Atherosclerosis including coronary artery involvement.         LAB  None    Assessment:  (Medical Decision Making)     Hospital Problems  Date Reviewed: 5/10/2021          Codes Class Noted POA    * (Principal) Pneumothorax on right ICD-10-CM: J93.9  ICD-9-CM: 512.89  5/10/2021 Yes    Chest tube placed 5/11 - now resolved and CT clamped    History of tobacco use (Chronic) ICD-10-CM: F67.469  ICD-9-CM: V15.82  5/10/2021 Yes    40 years X 1 ppd - quit several years ago    Chronic anticoagulation (Chronic) ICD-10-CM: Z79.01  ICD-9-CM: V58.61  5/10/2021 Yes    Hx a fib/flutter - on Xarelto as outpt          Plan:  (Medical Decision Making)   1. CXR reviewed with Dr. Sonny Lowery. Chest tube removed and vaseline gauze, 4X4 and Tegaderm dressing applied.   He was instructed to avoid any heavy lifting or strenuous activity for next couple of weeks. No follow up planned. He was instructed to return to the ER if he develops any shortness of breath. 2. Resume Xaantoine Bravo NP    More than 50% of time documented was spent face-to-face contact with the patient and in the care of the patient on the floor/unit where the patient is located.

## 2021-06-30 ENCOUNTER — HOSPITAL ENCOUNTER (OUTPATIENT)
Dept: LAB | Age: 71
Discharge: HOME | End: 2021-06-30

## 2021-06-30 PROCEDURE — 88305 TISSUE EXAM BY PATHOLOGIST: CPT

## 2021-06-30 PROCEDURE — 88312 SPECIAL STAINS GROUP 1: CPT

## 2021-07-29 ENCOUNTER — APPOINTMENT (RX ONLY)
Dept: URBAN - METROPOLITAN AREA CLINIC 23 | Facility: CLINIC | Age: 71
Setting detail: DERMATOLOGY
End: 2021-07-29

## 2021-07-29 ENCOUNTER — RX ONLY (OUTPATIENT)
Age: 71
Setting detail: RX ONLY
End: 2021-07-29

## 2021-07-29 DIAGNOSIS — D485 NEOPLASM OF UNCERTAIN BEHAVIOR OF SKIN: ICD-10-CM

## 2021-07-29 PROBLEM — D48.5 NEOPLASM OF UNCERTAIN BEHAVIOR OF SKIN: Status: ACTIVE | Noted: 2021-07-29

## 2021-07-29 PROCEDURE — ? BIOPSY BY SHAVE METHOD

## 2021-07-29 PROCEDURE — 11102 TANGNTL BX SKIN SINGLE LES: CPT

## 2021-07-29 RX ORDER — CEPHALEXIN 500 MG/1
CAPSULE ORAL
Qty: 6 | Refills: 0 | Status: ERX | COMMUNITY
Start: 2021-07-29

## 2021-07-29 ASSESSMENT — LOCATION DETAILED DESCRIPTION DERM: LOCATION DETAILED: LEFT DISTAL DORSAL FOREARM

## 2021-07-29 ASSESSMENT — LOCATION ZONE DERM: LOCATION ZONE: ARM

## 2021-07-29 ASSESSMENT — LOCATION SIMPLE DESCRIPTION DERM: LOCATION SIMPLE: LEFT FOREARM

## 2021-07-29 NOTE — PROCEDURE: BIOPSY BY SHAVE METHOD
Validate Anticipated Plan: No
X Size Of Lesion In Cm: 0
Post-care instructions were reviewed in detail and written instructions are provided. Patient is to keep the biopsy site dry overnight, and then apply bacitracin twice daily until healed. Patient may apply hydrogen peroxide soaks to remove any crusting.
Wound Care: Vaseline
Accession #: [id number]
Biopsy Type: H and E
Biopsy Method: Dermablade
Electrodesiccation And Curettage Text: The wound bed was treated with electrodesiccation and curettage after the biopsy was performed.
Billing Type: Third-Party Bill
Electrodesiccation Text: The wound bed was treated with electrodesiccation after the biopsy was performed.
Dressing: bandage
Silver Nitrate Text: The wound bed was treated with silver nitrate after the biopsy was performed.
Notification Instructions: Patient will be notified of biopsy results. However, patient instructed to call the office if not contacted within 2 weeks.
Anesthesia Volume In Cc: 0.3
Detail Level: Detailed
Cryotherapy Text: The wound bed was treated with cryotherapy after the biopsy was performed.
Type Of Destruction Used: Curettage
Anesthesia Type: 1% lidocaine with epinephrine
Hemostasis: Aluminum Chloride
Was A Bandage Applied: Yes
Depth Of Biopsy: dermis
Consent: Written consent was obtained and risks were reviewed including but not limited to scarring, infection, bleeding, scabbing, incomplete removal, and allergy to anesthesia.
Information: Selecting Yes will display possible errors in your note based on the variables you have selected. This validation is only offered as a suggestion for you. PLEASE NOTE THAT THE VALIDATION TEXT WILL BE REMOVED WHEN YOU FINALIZE YOUR NOTE. IF YOU WANT TO FAX A PRELIMINARY NOTE YOU WILL NEED TO TOGGLE THIS TO 'NO' IF YOU DO NOT WANT IT IN YOUR FAXED NOTE.

## 2021-08-11 ENCOUNTER — APPOINTMENT (RX ONLY)
Dept: URBAN - METROPOLITAN AREA CLINIC 23 | Facility: CLINIC | Age: 71
Setting detail: DERMATOLOGY
End: 2021-08-11

## 2021-08-11 PROBLEM — C44.629 SQUAMOUS CELL CARCINOMA OF SKIN OF LEFT UPPER LIMB, INCLUDING SHOULDER: Status: ACTIVE | Noted: 2021-08-11

## 2021-08-11 PROCEDURE — ? EXCISION

## 2021-08-11 PROCEDURE — 11602 EXC TR-EXT MAL+MARG 1.1-2 CM: CPT

## 2021-08-11 PROCEDURE — 13121 CMPLX RPR S/A/L 2.6-7.5 CM: CPT

## 2021-08-11 NOTE — PROCEDURE: EXCISION
Complex Repair And Xenograft Text: The defect edges were debeveled with a #15 scalpel blade.  The primary defect was closed partially with a complex linear closure.  Given the location of the defect, shape of the defect and the proximity to free margins a xenograft was deemed most appropriate to repair the remaining defect.  The graft was trimmed to fit the size of the remaining defect.  The graft was then placed in the primary defect, oriented appropriately, and sutured into place.
Advancement Flap (Double) Text: The defect edges were debeveled with a #15 scalpel blade.  Given the location of the defect and the proximity to free margins a double advancement flap was deemed most appropriate.  Using a sterile surgical marker, the appropriate advancement flaps were drawn incorporating the defect and placing the expected incisions within the relaxed skin tension lines where possible.    The area thus outlined was incised deep to adipose tissue with a #15 scalpel blade.  The skin margins were undermined to an appropriate distance in all directions utilizing iris scissors.
Complex Repair And Single Advancement Flap Text: The defect edges were debeveled with a #15 scalpel blade.  The primary defect was closed partially with a complex linear closure.  Given the location of the remaining defect, shape of the defect and the proximity to free margins a single advancement flap was deemed most appropriate for complete closure of the defect.  Using a sterile surgical marker, an appropriate advancement flap was drawn incorporating the defect and placing the expected incisions within the relaxed skin tension lines where possible.    The area thus outlined was incised deep to adipose tissue with a #15 scalpel blade.  The skin margins were undermined to an appropriate distance in all directions utilizing iris scissors.
Hemostasis: Electrocautery
Rhombic Flap Text: The defect edges were debeveled with a #15 scalpel blade.  Given the location of the defect and the proximity to free margins a rhombic flap was deemed most appropriate.  Using a sterile surgical marker, an appropriate rhombic flap was drawn incorporating the defect.    The area thus outlined was incised deep to adipose tissue with a #15 scalpel blade.  The skin margins were undermined to an appropriate distance in all directions utilizing iris scissors.
Island Pedicle Flap-Requiring Vessel Identification Text: The defect edges were debeveled with a #15 scalpel blade.  Given the location of the defect, shape of the defect and the proximity to free margins an island pedicle advancement flap was deemed most appropriate.  Using a sterile surgical marker, an appropriate advancement flap was drawn, based on the axial vessel mentioned above, incorporating the defect, outlining the appropriate donor tissue and placing the expected incisions within the relaxed skin tension lines where possible.    The area thus outlined was incised deep to adipose tissue with a #15 scalpel blade.  The skin margins were undermined to an appropriate distance in all directions around the primary defect and laterally outward around the island pedicle utilizing iris scissors.  There was minimal undermining beneath the pedicle flap.
Crescentic Advancement Flap Text: The defect edges were debeveled with a #15 scalpel blade.  Given the location of the defect and the proximity to free margins a crescentic advancement flap was deemed most appropriate.  Using a sterile surgical marker, the appropriate advancement flap was drawn incorporating the defect and placing the expected incisions within the relaxed skin tension lines where possible.    The area thus outlined was incised deep to adipose tissue with a #15 scalpel blade.  The skin margins were undermined to an appropriate distance in all directions utilizing iris scissors.
Trilobed Flap Text: The defect edges were debeveled with a #15 scalpel blade.  Given the location of the defect and the proximity to free margins a trilobed flap was deemed most appropriate.  Using a sterile surgical marker, an appropriate trilobed flap drawn around the defect.    The area thus outlined was incised deep to adipose tissue with a #15 scalpel blade.  The skin margins were undermined to an appropriate distance in all directions utilizing iris scissors.
Scalpel Size: 15 blade
Complex Repair And Dermal Autograft Text: The defect edges were debeveled with a #15 scalpel blade.  The primary defect was closed partially with a complex linear closure.  Given the location of the defect, shape of the defect and the proximity to free margins an dermal autograft was deemed most appropriate to repair the remaining defect.  The graft was trimmed to fit the size of the remaining defect.  The graft was then placed in the primary defect, oriented appropriately, and sutured into place.
Skin Substitute Text: The defect edges were debeveled with a #15 scalpel blade.  Given the location of the defect, shape of the defect and the proximity to free margins a skin substitute graft was deemed most appropriate.  The graft material was trimmed to fit the size of the defect. The graft was then placed in the primary defect and oriented appropriately.
Detail Level: Detailed
Render Path Notes In Note?: no
Hatchet Flap Text: The defect edges were debeveled with a #15 scalpel blade.  Given the location of the defect, shape of the defect and the proximity to free margins a hatchet flap was deemed most appropriate.  Using a sterile surgical marker, an appropriate hatchet flap was drawn incorporating the defect and placing the expected incisions within the relaxed skin tension lines where possible.    The area thus outlined was incised deep to adipose tissue with a #15 scalpel blade.  The skin margins were undermined to an appropriate distance in all directions utilizing iris scissors.
Anesthesia Volume In Cc: 6
Show Accession Variable: Yes
Home Suture Removal Text: Patient was provided a home suture removal kit and will remove their sutures at home.  If they have any questions or difficulties they will call the office.
Complex Repair And Melolabial Flap Text: The defect edges were debeveled with a #15 scalpel blade.  The primary defect was closed partially with a complex linear closure.  Given the location of the remaining defect, shape of the defect and the proximity to free margins a melolabial flap was deemed most appropriate for complete closure of the defect.  Using a sterile surgical marker, an appropriate advancement flap was drawn incorporating the defect and placing the expected incisions within the relaxed skin tension lines where possible.    The area thus outlined was incised deep to adipose tissue with a #15 scalpel blade.  The skin margins were undermined to an appropriate distance in all directions utilizing iris scissors.
W Plasty Text: The lesion was extirpated to the level of the fat with a #15 scalpel blade.  Given the location of the defect, shape of the defect and the proximity to free margins a W-plasty was deemed most appropriate for repair.  Using a sterile surgical marker, the appropriate transposition arms of the W-plasty were drawn incorporating the defect and placing the expected incisions within the relaxed skin tension lines where possible.    The area thus outlined was incised deep to adipose tissue with a #15 scalpel blade.  The skin margins were undermined to an appropriate distance in all directions utilizing iris scissors.  The opposing transposition arms were then transposed into place in opposite direction and anchored with interrupted buried subcutaneous sutures.
No Repair - Repaired With Adjacent Surgical Defect Text (Leave Blank If You Do Not Want): After the excision the defect was repaired concurrently with another surgical defect which was in close approximation.
Advancement-Rotation Flap Text: The defect edges were debeveled with a #15 scalpel blade.  Given the location of the defect, shape of the defect and the proximity to free margins an advancement-rotation flap was deemed most appropriate.  Using a sterile surgical marker, an appropriate flap was drawn incorporating the defect and placing the expected incisions within the relaxed skin tension lines where possible. The area thus outlined was incised deep to adipose tissue with a #15 scalpel blade.  The skin margins were undermined to an appropriate distance in all directions utilizing iris scissors.
Bi-Rhombic Flap Text: The defect edges were debeveled with a #15 scalpel blade.  Given the location of the defect and the proximity to free margins a bi-rhombic flap was deemed most appropriate.  Using a sterile surgical marker, an appropriate rhombic flap was drawn incorporating the defect. The area thus outlined was incised deep to adipose tissue with a #15 scalpel blade.  The skin margins were undermined to an appropriate distance in all directions utilizing iris scissors.
Complex Repair And Modified Advancement Flap Text: The defect edges were debeveled with a #15 scalpel blade.  The primary defect was closed partially with a complex linear closure.  Given the location of the remaining defect, shape of the defect and the proximity to free margins a modified advancement flap was deemed most appropriate for complete closure of the defect.  Using a sterile surgical marker, an appropriate advancement flap was drawn incorporating the defect and placing the expected incisions within the relaxed skin tension lines where possible.    The area thus outlined was incised deep to adipose tissue with a #15 scalpel blade.  The skin margins were undermined to an appropriate distance in all directions utilizing iris scissors.
O-T Plasty Text: The defect edges were debeveled with a #15 scalpel blade.  Given the location of the defect, shape of the defect and the proximity to free margins an O-T plasty was deemed most appropriate.  Using a sterile surgical marker, an appropriate O-T plasty was drawn incorporating the defect and placing the expected incisions within the relaxed skin tension lines where possible.    The area thus outlined was incised deep to adipose tissue with a #15 scalpel blade.  The skin margins were undermined to an appropriate distance in all directions utilizing iris scissors.
O-T Advancement Flap Text: The defect edges were debeveled with a #15 scalpel blade.  Given the location of the defect, shape of the defect and the proximity to free margins an O-T advancement flap was deemed most appropriate.  Using a sterile surgical marker, an appropriate advancement flap was drawn incorporating the defect and placing the expected incisions within the relaxed skin tension lines where possible.    The area thus outlined was incised deep to adipose tissue with a #15 scalpel blade.  The skin margins were undermined to an appropriate distance in all directions utilizing iris scissors.
Where Do You Want The Question To Include Opioid Counseling Located?: Case Summary Tab
Island Pedicle Flap Text: The defect edges were debeveled with a #15 scalpel blade.  Given the location of the defect, shape of the defect and the proximity to free margins an island pedicle advancement flap was deemed most appropriate.  Using a sterile surgical marker, an appropriate advancement flap was drawn incorporating the defect, outlining the appropriate donor tissue and placing the expected incisions within the relaxed skin tension lines where possible.    The area thus outlined was incised deep to adipose tissue with a #15 scalpel blade.  The skin margins were undermined to an appropriate distance in all directions around the primary defect and laterally outward around the island pedicle utilizing iris scissors.  There was minimal undermining beneath the pedicle flap.
Purse String (Intermediate) Text: Given the location of the defect and the characteristics of the surrounding skin a purse string intermediate closure was deemed most appropriate.  Undermining was performed circumfirentially around the surgical defect.  A purse string suture was then placed and tightened.
Path Notes (To The Dermatopathologist): Please check margins.
Nasalis-Muscle-Based Myocutaneous Island Pedicle Flap Text: Using a #15 blade, an incision was made around the donor flap to the level of the nasalis muscle. Wide lateral undermining was then performed in both the subcutaneous plane above the nasalis muscle, and in a submuscular plane just above periosteum. This allowed the formation of a free nasalis muscle axial pedicle (based on the angular artery) which was still attached to the actual cutaneous flap, increasing its mobility and vascular viability. Hemostasis was obtained with pinpoint electrocoagulation. The flap was mobilized into position and the pivotal anchor points positioned and stabilized with buried interrupted sutures. Subcutaneous and dermal tissues were closed in a multilayered fashion with sutures. Tissue redundancies were excised, and the epidermal edges were apposed without significant tension and sutured with sutures.
Lazy S Intermediate Repair Preamble Text (Leave Blank If You Do Not Want): Undermining was performed with blunt dissection.
X Size Of Lesion In Cm (Optional): 0
Lip Wedge Excision Repair Text: Given the location of the defect and the proximity to free margins a full thickness wedge repair was deemed most appropriate.  Using a sterile surgical marker, the appropriate repair was drawn incorporating the defect and placing the expected incisions perpendicular to the vermilion border.  The vermilion border was also meticulously outlined to ensure appropriate reapproximation during the repair.  The area thus outlined was incised through and through with a #15 scalpel blade.  The muscularis and dermis were reaproximated with deep sutures following hemostasis. Care was taken to realign the vermilion border before proceeding with the superficial closure.  Once the vermilion was realigned the superfical and mucosal closure was finished.
Crescentic Complex Repair Preamble Text (Leave Blank If You Do Not Want): Extensive wide undermining was performed.
Excisional Biopsy Additional Text (Leave Blank If You Do Not Want): The margin was drawn around the clinically apparent lesion. An elliptical shape was then drawn on the skin incorporating the lesion and margins.  Incisions were then made along these lines to the appropriate tissue plane and the lesion was extirpated.
Melolabial Transposition Flap Text: The defect edges were debeveled with a #15 scalpel blade.  Given the location of the defect and the proximity to free margins a melolabial flap was deemed most appropriate.  Using a sterile surgical marker, an appropriate melolabial transposition flap was drawn incorporating the defect.    The area thus outlined was incised deep to adipose tissue with a #15 scalpel blade.  The skin margins were undermined to an appropriate distance in all directions utilizing iris scissors.
Partial Purse String (Intermediate) Text: Given the location of the defect and the characteristics of the surrounding skin an intermediate purse string closure was deemed most appropriate.  Undermining was performed circumferentially around the surgical defect.  A purse string suture was then placed and tightened. Wound tension of the circular defect prevented complete closure of the wound.
O-Z Plasty Text: The defect edges were debeveled with a #15 scalpel blade.  Given the location of the defect, shape of the defect and the proximity to free margins an O-Z plasty (double transposition flap) was deemed most appropriate.  Using a sterile surgical marker, the appropriate transposition flaps were drawn incorporating the defect and placing the expected incisions within the relaxed skin tension lines where possible.    The area thus outlined was incised deep to adipose tissue with a #15 scalpel blade.  The skin margins were undermined to an appropriate distance in all directions utilizing iris scissors.  Hemostasis was achieved with electrocautery.  The flaps were then transposed into place, one clockwise and the other counterclockwise, and anchored with interrupted buried subcutaneous sutures.
Accession #: [id number]
Suture Removal: 14 days
Mastoid Interpolation Flap Text: A decision was made to reconstruct the defect utilizing an interpolation axial flap and a staged reconstruction.  A telfa template was made of the defect.  This telfa template was then used to outline the mastoid interpolation flap.  The donor area for the pedicle flap was then injected with anesthesia.  The flap was excised through the skin and subcutaneous tissue down to the layer of the underlying musculature.  The pedicle flap was carefully excised within this deep plane to maintain its blood supply.  The edges of the donor site were undermined.   The donor site was closed in a primary fashion.  The pedicle was then rotated into position and sutured.  Once the tube was sutured into place, adequate blood supply was confirmed with blanching and refill.  The pedicle was then wrapped with xeroform gauze and dressed appropriately with a telfa and gauze bandage to ensure continued blood supply and protect the attached pedicle.
Billing Type: Third-Party Bill
Burow's Advancement Flap Text: The defect edges were debeveled with a #15 scalpel blade.  Given the location of the defect and the proximity to free margins a Burow's advancement flap was deemed most appropriate.  Using a sterile surgical marker, the appropriate advancement flap was drawn incorporating the defect and placing the expected incisions within the relaxed skin tension lines where possible.    The area thus outlined was incised deep to adipose tissue with a #15 scalpel blade.  The skin margins were undermined to an appropriate distance in all directions utilizing iris scissors.
Complex Repair And Skin Substitute Graft Text: The defect edges were debeveled with a #15 scalpel blade.  The primary defect was closed partially with a complex linear closure.  Given the location of the remaining defect, shape of the defect and the proximity to free margins a skin substitute graft was deemed most appropriate to repair the remaining defect.  The graft was trimmed to fit the size of the remaining defect.  The graft was then placed in the primary defect, oriented appropriately, and sutured into place.
Bilobed Flap Text: The defect edges were debeveled with a #15 scalpel blade.  Given the location of the defect and the proximity to free margins a bilobe flap was deemed most appropriate.  Using a sterile surgical marker, an appropriate bilobe flap drawn around the defect.    The area thus outlined was incised deep to adipose tissue with a #15 scalpel blade.  The skin margins were undermined to an appropriate distance in all directions utilizing iris scissors.
Split-Thickness Skin Graft Text: The defect edges were debeveled with a #15 scalpel blade.  Given the location of the defect, shape of the defect and the proximity to free margins a split thickness skin graft was deemed most appropriate.  Using a sterile surgical marker, the primary defect shape was transferred to the donor site. The split thickness graft was then harvested.  The skin graft was then placed in the primary defect and oriented appropriately.
Rhomboid Transposition Flap Text: The defect edges were debeveled with a #15 scalpel blade.  Given the location of the defect and the proximity to free margins a rhomboid transposition flap was deemed most appropriate.  Using a sterile surgical marker, an appropriate rhomboid flap was drawn incorporating the defect.    The area thus outlined was incised deep to adipose tissue with a #15 scalpel blade.  The skin margins were undermined to an appropriate distance in all directions utilizing iris scissors.
Complex Repair And Burow's Graft Text: The defect edges were debeveled with a #15 scalpel blade.  The primary defect was closed partially with a complex linear closure.  Given the location of the defect, shape of the defect, the proximity to free margins and the presence of a standing cone deformity a Burow's graft was deemed most appropriate to repair the remaining defect.  The graft was trimmed to fit the size of the remaining defect.  The graft was then placed in the primary defect, oriented appropriately, and sutured into place.
Excision Depth: adipose tissue
Xenograft Text: The defect edges were debeveled with a #15 scalpel blade.  Given the location of the defect, shape of the defect and the proximity to free margins a xenograft was deemed most appropriate.  The graft was then trimmed to fit the size of the defect.  The graft was then placed in the primary defect and oriented appropriately.
Spiral Flap Text: The defect edges were debeveled with a #15 scalpel blade.  Given the location of the defect, shape of the defect and the proximity to free margins a spiral flap was deemed most appropriate.  Using a sterile surgical marker, an appropriate rotation flap was drawn incorporating the defect and placing the expected incisions within the relaxed skin tension lines where possible. The area thus outlined was incised deep to adipose tissue with a #15 scalpel blade.  The skin margins were undermined to an appropriate distance in all directions utilizing iris scissors.
Complex Repair And Rhombic Flap Text: The defect edges were debeveled with a #15 scalpel blade.  The primary defect was closed partially with a complex linear closure.  Given the location of the remaining defect, shape of the defect and the proximity to free margins a rhombic flap was deemed most appropriate for complete closure of the defect.  Using a sterile surgical marker, an appropriate advancement flap was drawn incorporating the defect and placing the expected incisions within the relaxed skin tension lines where possible.    The area thus outlined was incised deep to adipose tissue with a #15 scalpel blade.  The skin margins were undermined to an appropriate distance in all directions utilizing iris scissors.
Advancement Flap (Single) Text: The defect edges were debeveled with a #15 scalpel blade.  Given the location of the defect and the proximity to free margins a single advancement flap was deemed most appropriate.  Using a sterile surgical marker, an appropriate advancement flap was drawn incorporating the defect and placing the expected incisions within the relaxed skin tension lines where possible.    The area thus outlined was incised deep to adipose tissue with a #15 scalpel blade.  The skin margins were undermined to an appropriate distance in all directions utilizing iris scissors.
Bilateral Helical Rim Advancement Flap Text: The defect edges were debeveled with a #15 blade scalpel.  Given the location of the defect and the proximity to free margins (helical rim) a bilateral helical rim advancement flap was deemed most appropriate.  Using a sterile surgical marker, the appropriate advancement flaps were drawn incorporating the defect and placing the expected incisions between the helical rim and antihelix where possible.  The area thus outlined was incised through and through with a #15 scalpel blade.  With a skin hook and iris scissors, the flaps were gently and sharply undermined and freed up.
Complex Repair And Dorsal Nasal Flap Text: The defect edges were debeveled with a #15 scalpel blade.  The primary defect was closed partially with a complex linear closure.  Given the location of the remaining defect, shape of the defect and the proximity to free margins a dorsal nasal flap was deemed most appropriate for complete closure of the defect.  Using a sterile surgical marker, an appropriate flap was drawn incorporating the defect and placing the expected incisions within the relaxed skin tension lines where possible.    The area thus outlined was incised deep to adipose tissue with a #15 scalpel blade.  The skin margins were undermined to an appropriate distance in all directions utilizing iris scissors.
Curvilinear Excision Additional Text (Leave Blank If You Do Not Want): The margin was drawn around the clinically apparent lesion.  A curvilinear shape was then drawn on the skin incorporating the lesion and margins.  Incisions were then made along these lines to the appropriate tissue plane and the lesion was extirpated.
Epidermal Closure: simple interrupted
Graft Donor Site Bandage (Optional-Leave Blank If You Don't Want In Note): Steri-strips and a pressure bandage were applied to the donor site.
Anesthesia Type: 1% lidocaine with epinephrine
Saucerization Excision Additional Text (Leave Blank If You Do Not Want): The margin was drawn around the clinically apparent lesion.  Incisions were then made along these lines, in a tangential fashion, to the appropriate tissue plane and the lesion was extirpated.
Star Wedge Flap Text: The defect edges were debeveled with a #15 scalpel blade.  Given the location of the defect, shape of the defect and the proximity to free margins a star wedge flap was deemed most appropriate.  Using a sterile surgical marker, an appropriate rotation flap was drawn incorporating the defect and placing the expected incisions within the relaxed skin tension lines where possible. The area thus outlined was incised deep to adipose tissue with a #15 scalpel blade.  The skin margins were undermined to an appropriate distance in all directions utilizing iris scissors.
Ftsg Text: The defect edges were debeveled with a #15 scalpel blade.  Given the location of the defect, shape of the defect and the proximity to free margins a full thickness skin graft was deemed most appropriate.  Using a sterile surgical marker, the primary defect shape was transferred to the donor site. The area thus outlined was incised deep to adipose tissue with a #15 scalpel blade.  The harvested graft was then trimmed of adipose tissue until only dermis and epidermis was left.  The skin margins of the secondary defect were undermined to an appropriate distance in all directions utilizing iris scissors.  The secondary defect was closed with interrupted buried subcutaneous sutures.  The skin edges were then re-apposed with running  sutures.  The skin graft was then placed in the primary defect and oriented appropriately.
Number Of Hemigard Strips Per Side: 1
Suturegard Body: The suture ends were repeatedly re-tightened and re-clamped to achieve the desired tissue expansion.
Epidermal Autograft Text: The defect edges were debeveled with a #15 scalpel blade.  Given the location of the defect, shape of the defect and the proximity to free margins an epidermal autograft was deemed most appropriate.  Using a sterile surgical marker, the primary defect shape was transferred to the donor site. The epidermal graft was then harvested.  The skin graft was then placed in the primary defect and oriented appropriately.
Complex Repair And Transposition Flap Text: The defect edges were debeveled with a #15 scalpel blade.  The primary defect was closed partially with a complex linear closure.  Given the location of the remaining defect, shape of the defect and the proximity to free margins a transposition flap was deemed most appropriate for complete closure of the defect.  Using a sterile surgical marker, an appropriate advancement flap was drawn incorporating the defect and placing the expected incisions within the relaxed skin tension lines where possible.    The area thus outlined was incised deep to adipose tissue with a #15 scalpel blade.  The skin margins were undermined to an appropriate distance in all directions utilizing iris scissors.
Complex Repair And O-L Flap Text: The defect edges were debeveled with a #15 scalpel blade.  The primary defect was closed partially with a complex linear closure.  Given the location of the remaining defect, shape of the defect and the proximity to free margins an O-L flap was deemed most appropriate for complete closure of the defect.  Using a sterile surgical marker, an appropriate flap was drawn incorporating the defect and placing the expected incisions within the relaxed skin tension lines where possible.    The area thus outlined was incised deep to adipose tissue with a #15 scalpel blade.  The skin margins were undermined to an appropriate distance in all directions utilizing iris scissors.
Mucosal Advancement Flap Text: Given the location of the defect, shape of the defect and the proximity to free margins a mucosal advancement flap was deemed most appropriate. Incisions were made with a 15 blade scalpel in the appropriate fashion along the cutaneous vermilion border and the mucosal lip. The remaining actinically damaged mucosal tissue was excised.  The mucosal advancement flap was then elevated to the gingival sulcus with care taken to preserve the neurovascular structures and advanced into the primary defect. Care was taken to ensure that precise realignment of the vermilion border was achieved.
Complex Repair And Ftsg Text: The defect edges were debeveled with a #15 scalpel blade.  The primary defect was closed partially with a complex linear closure.  Given the location of the defect, shape of the defect and the proximity to free margins a full thickness skin graft was deemed most appropriate to repair the remaining defect.  The graft was trimmed to fit the size of the remaining defect.  The graft was then placed in the primary defect, oriented appropriately, and sutured into place.
V-Y Plasty Text: The defect edges were debeveled with a #15 scalpel blade.  Given the location of the defect, shape of the defect and the proximity to free margins an V-Y advancement flap was deemed most appropriate.  Using a sterile surgical marker, an appropriate advancement flap was drawn incorporating the defect and placing the expected incisions within the relaxed skin tension lines where possible.    The area thus outlined was incised deep to adipose tissue with a #15 scalpel blade.  The skin margins were undermined to an appropriate distance in all directions utilizing iris scissors.
Double O-Z Flap Text: The defect edges were debeveled with a #15 scalpel blade.  Given the location of the defect, shape of the defect and the proximity to free margins a Double O-Z flap was deemed most appropriate.  Using a sterile surgical marker, an appropriate transposition flap was drawn incorporating the defect and placing the expected incisions within the relaxed skin tension lines where possible. The area thus outlined was incised deep to adipose tissue with a #15 scalpel blade.  The skin margins were undermined to an appropriate distance in all directions utilizing iris scissors.
Helical Rim Text: The closure involved the helical rim.
Cheek-To-Nose Interpolation Flap Text: A decision was made to reconstruct the defect utilizing an interpolation axial flap and a staged reconstruction.  A telfa template was made of the defect.  This telfa template was then used to outline the Cheek-To-Nose Interpolation flap.  The donor area for the pedicle flap was then injected with anesthesia.  The flap was excised through the skin and subcutaneous tissue down to the layer of the underlying musculature.  The interpolation flap was carefully excised within this deep plane to maintain its blood supply.  The edges of the donor site were undermined.   The donor site was closed in a primary fashion.  The pedicle was then rotated into position and sutured.  Once the tube was sutured into place, adequate blood supply was confirmed with blanching and refill.  The pedicle was then wrapped with xeroform gauze and dressed appropriately with a telfa and gauze bandage to ensure continued blood supply and protect the attached pedicle.
Cartilage Graft Text: The defect edges were debeveled with a #15 scalpel blade.  Given the location of the defect, shape of the defect, the fact the defect involved a full thickness cartilage defect a cartilage graft was deemed most appropriate.  An appropriate donor site was identified, cleansed, and anesthetized. The cartilage graft was then harvested and transferred to the recipient site, oriented appropriately and then sutured into place.  The secondary defect was then repaired using a primary closure.
Width Of Defect Perpendicular To Closure In Cm (Required): 1.7
Transposition Flap Text: The defect edges were debeveled with a #15 scalpel blade.  Given the location of the defect and the proximity to free margins a transposition flap was deemed most appropriate.  Using a sterile surgical marker, an appropriate transposition flap was drawn incorporating the defect.    The area thus outlined was incised deep to adipose tissue with a #15 scalpel blade.  The skin margins were undermined to an appropriate distance in all directions utilizing iris scissors.
Complex Repair And M Plasty Text: The defect edges were debeveled with a #15 scalpel blade.  The primary defect was closed partially with a complex linear closure.  Given the location of the remaining defect, shape of the defect and the proximity to free margins an M plasty was deemed most appropriate for complete closure of the defect.  Using a sterile surgical marker, an appropriate advancement flap was drawn incorporating the defect and placing the expected incisions within the relaxed skin tension lines where possible.    The area thus outlined was incised deep to adipose tissue with a #15 scalpel blade.  The skin margins were undermined to an appropriate distance in all directions utilizing iris scissors.
Cheek Interpolation Flap Text: A decision was made to reconstruct the defect utilizing an interpolation axial flap and a staged reconstruction.  A telfa template was made of the defect.  This telfa template was then used to outline the Cheek Interpolation flap.  The donor area for the pedicle flap was then injected with anesthesia.  The flap was excised through the skin and subcutaneous tissue down to the layer of the underlying musculature.  The interpolation flap was carefully excised within this deep plane to maintain its blood supply.  The edges of the donor site were undermined.   The donor site was closed in a primary fashion.  The pedicle was then rotated into position and sutured.  Once the tube was sutured into place, adequate blood supply was confirmed with blanching and refill.  The pedicle was then wrapped with xeroform gauze and dressed appropriately with a telfa and gauze bandage to ensure continued blood supply and protect the attached pedicle.
Hemigard Postcare Instructions: The HEMIGARD strips are to remain completely dry for at least 5-7 days.
Eye Clamp Note Details: An eye clamp was used during the procedure.
Pre-Excision Curettage Text (Leave Blank If You Do Not Want): Prior to drawing the surgical margin the visible lesion was removed with electrodesiccation and curettage to clearly define the lesion size.
Nostril Rim Text: The closure involved the nostril rim.
Undermining Type: Entire Wound
V-Y Flap Text: The defect edges were debeveled with a #15 scalpel blade.  Given the location of the defect, shape of the defect and the proximity to free margins a V-Y flap was deemed most appropriate.  Using a sterile surgical marker, an appropriate advancement flap was drawn incorporating the defect and placing the expected incisions within the relaxed skin tension lines where possible.    The area thus outlined was incised deep to adipose tissue with a #15 scalpel blade.  The skin margins were undermined to an appropriate distance in all directions utilizing iris scissors.
Keystone Flap Text: The defect edges were debeveled with a #15 scalpel blade.  Given the location of the defect, shape of the defect a keystone flap was deemed most appropriate.  Using a sterile surgical marker, an appropriate keystone flap was drawn incorporating the defect, outlining the appropriate donor tissue and placing the expected incisions within the relaxed skin tension lines where possible. The area thus outlined was incised deep to adipose tissue with a #15 scalpel blade.  The skin margins were undermined to an appropriate distance in all directions around the primary defect and laterally outward around the flap utilizing iris scissors.
Dorsal Nasal Flap Text: The defect edges were debeveled with a #15 scalpel blade.  Given the location of the defect and the proximity to free margins a dorsal nasal flap was deemed most appropriate.  Using a sterile surgical marker, an appropriate dorsal nasal flap was drawn around the defect.    The area thus outlined was incised deep to adipose tissue with a #15 scalpel blade.  The skin margins were undermined to an appropriate distance in all directions utilizing iris scissors.
A-T Advancement Flap Text: The defect edges were debeveled with a #15 scalpel blade.  Given the location of the defect, shape of the defect and the proximity to free margins an A-T advancement flap was deemed most appropriate.  Using a sterile surgical marker, an appropriate advancement flap was drawn incorporating the defect and placing the expected incisions within the relaxed skin tension lines where possible.    The area thus outlined was incised deep to adipose tissue with a #15 scalpel blade.  The skin margins were undermined to an appropriate distance in all directions utilizing iris scissors.
Interpolation Flap Text: A decision was made to reconstruct the defect utilizing an interpolation axial flap and a staged reconstruction.  A telfa template was made of the defect.  This telfa template was then used to outline the interpolation flap.  The donor area for the pedicle flap was then injected with anesthesia.  The flap was excised through the skin and subcutaneous tissue down to the layer of the underlying musculature.  The interpolation flap was carefully excised within this deep plane to maintain its blood supply.  The edges of the donor site were undermined.   The donor site was closed in a primary fashion.  The pedicle was then rotated into position and sutured.  Once the tube was sutured into place, adequate blood supply was confirmed with blanching and refill.  The pedicle was then wrapped with xeroform gauze and dressed appropriately with a telfa and gauze bandage to ensure continued blood supply and protect the attached pedicle.
Complex Repair And Tissue Cultured Epidermal Autograft Text: The defect edges were debeveled with a #15 scalpel blade.  The primary defect was closed partially with a complex linear closure.  Given the location of the defect, shape of the defect and the proximity to free margins an tissue cultured epidermal autograft was deemed most appropriate to repair the remaining defect.  The graft was trimmed to fit the size of the remaining defect.  The graft was then placed in the primary defect, oriented appropriately, and sutured into place.
Dressing: dry sterile dressing
Double O-Z Plasty Text: The defect edges were debeveled with a #15 scalpel blade.  Given the location of the defect, shape of the defect and the proximity to free margins a Double O-Z plasty (double transposition flap) was deemed most appropriate.  Using a sterile surgical marker, the appropriate transposition flaps were drawn incorporating the defect and placing the expected incisions within the relaxed skin tension lines where possible. The area thus outlined was incised deep to adipose tissue with a #15 scalpel blade.  The skin margins were undermined to an appropriate distance in all directions utilizing iris scissors.  Hemostasis was achieved with electrocautery.  The flaps were then transposed into place, one clockwise and the other counterclockwise, and anchored with interrupted buried subcutaneous sutures.
Partial Purse String (Simple) Text: Given the location of the defect and the characteristics of the surrounding skin a simple purse string closure was deemed most appropriate.  Undermining was performed circumferentially around the surgical defect.  A purse string suture was then placed and tightened. Wound tension of the circular defect prevented complete closure of the wound.
Double Island Pedicle Flap Text: The defect edges were debeveled with a #15 scalpel blade.  Given the location of the defect, shape of the defect and the proximity to free margins a double island pedicle advancement flap was deemed most appropriate.  Using a sterile surgical marker, an appropriate advancement flap was drawn incorporating the defect, outlining the appropriate donor tissue and placing the expected incisions within the relaxed skin tension lines where possible.    The area thus outlined was incised deep to adipose tissue with a #15 scalpel blade.  The skin margins were undermined to an appropriate distance in all directions around the primary defect and laterally outward around the island pedicle utilizing iris scissors.  There was minimal undermining beneath the pedicle flap.
Hemigard Retention Suture: 2-0 Nylon
Helical Rim Advancement Flap Text: The defect edges were debeveled with a #15 blade scalpel.  Given the location of the defect and the proximity to free margins (helical rim) a double helical rim advancement flap was deemed most appropriate.  Using a sterile surgical marker, the appropriate advancement flaps were drawn incorporating the defect and placing the expected incisions between the helical rim and antihelix where possible.  The area thus outlined was incised through and through with a #15 scalpel blade.  With a skin hook and iris scissors, the flaps were gently and sharply undermined and freed up.
Z Plasty Text: The lesion was extirpated to the level of the fat with a #15 scalpel blade.  Given the location of the defect, shape of the defect and the proximity to free margins a Z-plasty was deemed most appropriate for repair.  Using a sterile surgical marker, the appropriate transposition arms of the Z-plasty were drawn incorporating the defect and placing the expected incisions within the relaxed skin tension lines where possible.    The area thus outlined was incised deep to adipose tissue with a #15 scalpel blade.  The skin margins were undermined to an appropriate distance in all directions utilizing iris scissors.  The opposing transposition arms were then transposed into place in opposite direction and anchored with interrupted buried subcutaneous sutures.
Mercedes Flap Text: The defect edges were debeveled with a #15 scalpel blade.  Given the location of the defect, shape of the defect and the proximity to free margins a Mercedes flap was deemed most appropriate.  Using a sterile surgical marker, an appropriate advancement flap was drawn incorporating the defect and placing the expected incisions within the relaxed skin tension lines where possible. The area thus outlined was incised deep to adipose tissue with a #15 scalpel blade.  The skin margins were undermined to an appropriate distance in all directions utilizing iris scissors.
Repair Type: Complex
Excision Method: Elliptical
Retention Suture Bite Size: 3 mm
Complex Repair And Bilobe Flap Text: The defect edges were debeveled with a #15 scalpel blade.  The primary defect was closed partially with a complex linear closure.  Given the location of the remaining defect, shape of the defect and the proximity to free margins a bilobe flap was deemed most appropriate for complete closure of the defect.  Using a sterile surgical marker, an appropriate advancement flap was drawn incorporating the defect and placing the expected incisions within the relaxed skin tension lines where possible.    The area thus outlined was incised deep to adipose tissue with a #15 scalpel blade.  The skin margins were undermined to an appropriate distance in all directions utilizing iris scissors.
Post-Care Instructions: I reviewed with the patient in detail post-care instructions. Patient is not to engage in any heavy lifting, exercise, or swimming for the next 14 days. Should the patient develop any fevers, chills, bleeding, severe pain patient will contact the office immediately.
H Plasty Text: Given the location of the defect, shape of the defect and the proximity to free margins a H-plasty was deemed most appropriate for repair.  Using a sterile surgical marker, the appropriate advancement arms of the H-plasty were drawn incorporating the defect and placing the expected incisions within the relaxed skin tension lines where possible. The area thus outlined was incised deep to adipose tissue with a #15 scalpel blade. The skin margins were undermined to an appropriate distance in all directions utilizing iris scissors.  The opposing advancement arms were then advanced into place in opposite direction and anchored with interrupted buried subcutaneous sutures.
Peng Advancement Flap Text: The defect edges were debeveled with a #15 scalpel blade.  Given the location of the defect, shape of the defect and the proximity to free margins a Peng advancement flap was deemed most appropriate.  Using a sterile surgical marker, an appropriate advancement flap was drawn incorporating the defect and placing the expected incisions within the relaxed skin tension lines where possible. The area thus outlined was incised deep to adipose tissue with a #15 scalpel blade.  The skin margins were undermined to an appropriate distance in all directions utilizing iris scissors.
Complex Repair And Double Advancement Flap Text: The defect edges were debeveled with a #15 scalpel blade.  The primary defect was closed partially with a complex linear closure.  Given the location of the remaining defect, shape of the defect and the proximity to free margins a double advancement flap was deemed most appropriate for complete closure of the defect.  Using a sterile surgical marker, an appropriate advancement flap was drawn incorporating the defect and placing the expected incisions within the relaxed skin tension lines where possible.    The area thus outlined was incised deep to adipose tissue with a #15 scalpel blade.  The skin margins were undermined to an appropriate distance in all directions utilizing iris scissors.
Epidermal Sutures: 4-0 Prolene
Banner Transposition Flap Text: The defect edges were debeveled with a #15 scalpel blade.  Given the location of the defect and the proximity to free margins a Banner transposition flap was deemed most appropriate.  Using a sterile surgical marker, an appropriate flap drawn around the defect. The area thus outlined was incised deep to adipose tissue with a #15 scalpel blade.  The skin margins were undermined to an appropriate distance in all directions utilizing iris scissors.
Nasal Turnover Hinge Flap Text: The defect edges were debeveled with a #15 scalpel blade.  Given the size, depth, location of the defect and the defect being full thickness a nasal turnover hinge flap was deemed most appropriate.  Using a sterile surgical marker, an appropriate hinge flap was drawn incorporating the defect. The area thus outlined was incised with a #15 scalpel blade. The flap was designed to recreate the nasal mucosal lining and the alar rim. The skin margins were undermined to an appropriate distance in all directions utilizing iris scissors.
Wound Care: Petrolatum
Perilesional Excision Additional Text (Leave Blank If You Do Not Want): The margin was drawn around the clinically apparent lesion. Incisions were then made along these lines to the appropriate tissue plane and the lesion was extirpated.
Complex Repair And W Plasty Text: The defect edges were debeveled with a #15 scalpel blade.  The primary defect was closed partially with a complex linear closure.  Given the location of the remaining defect, shape of the defect and the proximity to free margins a W plasty was deemed most appropriate for complete closure of the defect.  Using a sterile surgical marker, an appropriate advancement flap was drawn incorporating the defect and placing the expected incisions within the relaxed skin tension lines where possible.    The area thus outlined was incised deep to adipose tissue with a #15 scalpel blade.  The skin margins were undermined to an appropriate distance in all directions utilizing iris scissors.
Length To Time In Minutes Device Was In Place: 10
Fusiform Excision Additional Text (Leave Blank If You Do Not Want): The margin was drawn around the clinically apparent lesion.  A fusiform shape was then drawn on the skin incorporating the lesion and margins.  Incisions were then made along these lines to the appropriate tissue plane and the lesion was extirpated.
Size Of Lesion In Cm: 1.1
Paramedian Forehead Flap Text: A decision was made to reconstruct the defect utilizing an interpolation axial flap and a staged reconstruction.  A telfa template was made of the defect.  This telfa template was then used to outline the paramedian forehead pedicle flap.  The donor area for the pedicle flap was then injected with anesthesia.  The flap was excised through the skin and subcutaneous tissue down to the layer of the underlying musculature.  The pedicle flap was carefully excised within this deep plane to maintain its blood supply.  The edges of the donor site were undermined.   The donor site was closed in a primary fashion.  The pedicle was then rotated into position and sutured.  Once the tube was sutured into place, adequate blood supply was confirmed with blanching and refill.  The pedicle was then wrapped with xeroform gauze and dressed appropriately with a telfa and gauze bandage to ensure continued blood supply and protect the attached pedicle.
Burow's Graft Text: The defect edges were debeveled with a #15 scalpel blade.  Given the location of the defect, shape of the defect, the proximity to free margins and the presence of a standing cone deformity a Burow's skin graft was deemed most appropriate. The standing cone was removed and this tissue was then trimmed to the shape of the primary defect. The adipose tissue was also removed until only dermis and epidermis were left.  The skin margins of the secondary defect were undermined to an appropriate distance in all directions utilizing iris scissors.  The secondary defect was closed with interrupted buried subcutaneous sutures.  The skin edges were then re-apposed with running  sutures.  The skin graft was then placed in the primary defect and oriented appropriately.
Intermediate / Complex Repair - Final Wound Length In Cm: 4.8
Bilobed Transposition Flap Text: The defect edges were debeveled with a #15 scalpel blade.  Given the location of the defect and the proximity to free margins a bilobed transposition flap was deemed most appropriate.  Using a sterile surgical marker, an appropriate bilobe flap drawn around the defect.    The area thus outlined was incised deep to adipose tissue with a #15 scalpel blade.  The skin margins were undermined to an appropriate distance in all directions utilizing iris scissors.
Chonodrocutaneous Helical Advancement Flap Text: The defect edges were debeveled with a #15 scalpel blade.  Given the location of the defect and the proximity to free margins a chondrocutaneous helical advancement flap was deemed most appropriate.  Using a sterile surgical marker, the appropriate advancement flap was drawn incorporating the defect and placing the expected incisions within the relaxed skin tension lines where possible.    The area thus outlined was incised deep to adipose tissue with a #15 scalpel blade.  The skin margins were undermined to an appropriate distance in all directions utilizing iris scissors.
Estimated Blood Loss (Cc): minimal
Complex Repair And Epidermal Autograft Text: The defect edges were debeveled with a #15 scalpel blade.  The primary defect was closed partially with a complex linear closure.  Given the location of the defect, shape of the defect and the proximity to free margins an epidermal autograft was deemed most appropriate to repair the remaining defect.  The graft was trimmed to fit the size of the remaining defect.  The graft was then placed in the primary defect, oriented appropriately, and sutured into place.
Information: Selecting Yes will display possible errors in your note based on the variables you have selected. This validation is only offered as a suggestion for you. PLEASE NOTE THAT THE VALIDATION TEXT WILL BE REMOVED WHEN YOU FINALIZE YOUR NOTE. IF YOU WANT TO FAX A PRELIMINARY NOTE YOU WILL NEED TO TOGGLE THIS TO 'NO' IF YOU DO NOT WANT IT IN YOUR FAXED NOTE.
Orbicularis Oris Muscle Flap Text: The defect edges were debeveled with a #15 scalpel blade.  Given that the defect affected the competency of the oral sphincter an obicularis oris muscle flap was deemed most appropriate to restore this competency and normal muscle function.  Using a sterile surgical marker, an appropriate flap was drawn incorporating the defect. The area thus outlined was incised with a #15 scalpel blade.
Melolabial Interpolation Flap Text: A decision was made to reconstruct the defect utilizing an interpolation axial flap and a staged reconstruction.  A telfa template was made of the defect.  This telfa template was then used to outline the melolabial interpolation flap.  The donor area for the pedicle flap was then injected with anesthesia.  The flap was excised through the skin and subcutaneous tissue down to the layer of the underlying musculature.  The pedicle flap was carefully excised within this deep plane to maintain its blood supply.  The edges of the donor site were undermined.   The donor site was closed in a primary fashion.  The pedicle was then rotated into position and sutured.  Once the tube was sutured into place, adequate blood supply was confirmed with blanching and refill.  The pedicle was then wrapped with xeroform gauze and dressed appropriately with a telfa and gauze bandage to ensure continued blood supply and protect the attached pedicle.
Purse String (Simple) Text: Given the location of the defect and the characteristics of the surrounding skin a purse string simple closure was deemed most appropriate.  Undermining was performed circumferentially around the surgical defect.  A purse string suture was then placed and tightened.
Deep Sutures: 4-0 Vicryl
Size Of Margin In Cm: 0.4
Debridement Text: The wound edges were debrided prior to proceeding with the closure to facilitate wound healing.
Staged Advancement Flap Text: The defect edges were debeveled with a #15 scalpel blade.  Given the location of the defect, shape of the defect and the proximity to free margins a staged advancement flap was deemed most appropriate.  Using a sterile surgical marker, an appropriate advancement flap was drawn incorporating the defect and placing the expected incisions within the relaxed skin tension lines where possible. The area thus outlined was incised deep to adipose tissue with a #15 scalpel blade.  The skin margins were undermined to an appropriate distance in all directions utilizing iris scissors.
Elliptical Excision Additional Text (Leave Blank If You Do Not Want): The margin was drawn around the clinically apparent lesion.  An elliptical shape was then drawn on the skin incorporating the lesion and margins.  Incisions were then made along these lines to the appropriate tissue plane and the lesion was extirpated.
Ear Star Wedge Flap Text: The defect edges were debeveled with a #15 blade scalpel.  Given the location of the defect and the proximity to free margins (helical rim) an ear star wedge flap was deemed most appropriate.  Using a sterile surgical marker, the appropriate flap was drawn incorporating the defect and placing the expected incisions between the helical rim and antihelix where possible.  The area thus outlined was incised through and through with a #15 scalpel blade.
Zygomaticofacial Flap Text: Given the location of the defect, shape of the defect and the proximity to free margins a zygomaticofacial flap was deemed most appropriate for repair.  Using a sterile surgical marker, the appropriate flap was drawn incorporating the defect and placing the expected incisions within the relaxed skin tension lines where possible. The area thus outlined was incised deep to adipose tissue with a #15 scalpel blade with preservation of a vascular pedicle.  The skin margins were undermined to an appropriate distance in all directions utilizing iris scissors.  The flap was then placed into the defect and anchored with interrupted buried subcutaneous sutures.
Distance Of Undermining In Cm (Required): 1.8
Complex Repair And Double M Plasty Text: The defect edges were debeveled with a #15 scalpel blade.  The primary defect was closed partially with a complex linear closure.  Given the location of the remaining defect, shape of the defect and the proximity to free margins a double M plasty was deemed most appropriate for complete closure of the defect.  Using a sterile surgical marker, an appropriate advancement flap was drawn incorporating the defect and placing the expected incisions within the relaxed skin tension lines where possible.    The area thus outlined was incised deep to adipose tissue with a #15 scalpel blade.  The skin margins were undermined to an appropriate distance in all directions utilizing iris scissors.
Muscle Hinge Flap Text: The defect edges were debeveled with a #15 scalpel blade.  Given the size, depth and location of the defect and the proximity to free margins a muscle hinge flap was deemed most appropriate.  Using a sterile surgical marker, an appropriate hinge flap was drawn incorporating the defect. The area thus outlined was incised with a #15 scalpel blade.  The skin margins were undermined to an appropriate distance in all directions utilizing iris scissors.
Tissue Cultured Epidermal Autograft Text: The defect edges were debeveled with a #15 scalpel blade.  Given the location of the defect, shape of the defect and the proximity to free margins a tissue cultured epidermal autograft was deemed most appropriate.  The graft was then trimmed to fit the size of the defect.  The graft was then placed in the primary defect and oriented appropriately.
Complex Repair And Rotation Flap Text: The defect edges were debeveled with a #15 scalpel blade.  The primary defect was closed partially with a complex linear closure.  Given the location of the remaining defect, shape of the defect and the proximity to free margins a rotation flap was deemed most appropriate for complete closure of the defect.  Using a sterile surgical marker, an appropriate advancement flap was drawn incorporating the defect and placing the expected incisions within the relaxed skin tension lines where possible.    The area thus outlined was incised deep to adipose tissue with a #15 scalpel blade.  The skin margins were undermined to an appropriate distance in all directions utilizing iris scissors.
Alar Island Pedicle Flap Text: The defect edges were debeveled with a #15 scalpel blade.  Given the location of the defect, shape of the defect and the proximity to the alar rim an island pedicle advancement flap was deemed most appropriate.  Using a sterile surgical marker, an appropriate advancement flap was drawn incorporating the defect, outlining the appropriate donor tissue and placing the expected incisions within the nasal ala running parallel to the alar rim. The area thus outlined was incised with a #15 scalpel blade.  The skin margins were undermined minimally to an appropriate distance in all directions around the primary defect and laterally outward around the island pedicle utilizing iris scissors.  There was minimal undermining beneath the pedicle flap.
Slit Excision Additional Text (Leave Blank If You Do Not Want): A linear line was drawn on the skin overlying the lesion. An incision was made slowly until the lesion was visualized.  Once visualized, the lesion was removed with blunt dissection.
Complex Repair And Split-Thickness Skin Graft Text: The defect edges were debeveled with a #15 scalpel blade.  The primary defect was closed partially with a complex linear closure.  Given the location of the defect, shape of the defect and the proximity to free margins a split thickness skin graft was deemed most appropriate to repair the remaining defect.  The graft was trimmed to fit the size of the remaining defect.  The graft was then placed in the primary defect, oriented appropriately, and sutured into place.
Complex Repair And Z Plasty Text: The defect edges were debeveled with a #15 scalpel blade.  The primary defect was closed partially with a complex linear closure.  Given the location of the remaining defect, shape of the defect and the proximity to free margins a Z plasty was deemed most appropriate for complete closure of the defect.  Using a sterile surgical marker, an appropriate advancement flap was drawn incorporating the defect and placing the expected incisions within the relaxed skin tension lines where possible.    The area thus outlined was incised deep to adipose tissue with a #15 scalpel blade.  The skin margins were undermined to an appropriate distance in all directions utilizing iris scissors.
Retention Suture Text: Retention sutures were placed to support the closure and prevent dehiscence.
Positioning (Leave Blank If You Do Not Want): The patient was placed in a comfortable position exposing the surgical site.
Vermilion Border Text: The closure involved the vermilion border.
Composite Graft Text: The defect edges were debeveled with a #15 scalpel blade.  Given the location of the defect, shape of the defect, the proximity to free margins and the fact the defect was full thickness a composite graft was deemed most appropriate.  The defect was outline and then transferred to the donor site.  A full thickness graft was then excised from the donor site. The graft was then placed in the primary defect, oriented appropriately and then sutured into place.  The secondary defect was then repaired using a primary closure.
Suturegard Intro: Intraoperative tissue expansion was performed, utilizing the SUTUREGARD device, in order to reduce wound tension.
Modified Advancement Flap Text: The defect edges were debeveled with a #15 scalpel blade.  Given the location of the defect, shape of the defect and the proximity to free margins a modified advancement flap was deemed most appropriate.  Using a sterile surgical marker, an appropriate advancement flap was drawn incorporating the defect and placing the expected incisions within the relaxed skin tension lines where possible.    The area thus outlined was incised deep to adipose tissue with a #15 scalpel blade.  The skin margins were undermined to an appropriate distance in all directions utilizing iris scissors.
Complex Repair And O-T Advancement Flap Text: The defect edges were debeveled with a #15 scalpel blade.  The primary defect was closed partially with a complex linear closure.  Given the location of the remaining defect, shape of the defect and the proximity to free margins an O-T advancement flap was deemed most appropriate for complete closure of the defect.  Using a sterile surgical marker, an appropriate advancement flap was drawn incorporating the defect and placing the expected incisions within the relaxed skin tension lines where possible.    The area thus outlined was incised deep to adipose tissue with a #15 scalpel blade.  The skin margins were undermined to an appropriate distance in all directions utilizing iris scissors.
O-Z Flap Text: The defect edges were debeveled with a #15 scalpel blade.  Given the location of the defect, shape of the defect and the proximity to free margins an O-Z flap was deemed most appropriate.  Using a sterile surgical marker, an appropriate transposition flap was drawn incorporating the defect and placing the expected incisions within the relaxed skin tension lines where possible. The area thus outlined was incised deep to adipose tissue with a #15 scalpel blade.  The skin margins were undermined to an appropriate distance in all directions utilizing iris scissors.
Posterior Auricular Interpolation Flap Text: A decision was made to reconstruct the defect utilizing an interpolation axial flap and a staged reconstruction.  A telfa template was made of the defect.  This telfa template was then used to outline the posterior auricular interpolation flap.  The donor area for the pedicle flap was then injected with anesthesia.  The flap was excised through the skin and subcutaneous tissue down to the layer of the underlying musculature.  The pedicle flap was carefully excised within this deep plane to maintain its blood supply.  The edges of the donor site were undermined.   The donor site was closed in a primary fashion.  The pedicle was then rotated into position and sutured.  Once the tube was sutured into place, adequate blood supply was confirmed with blanching and refill.  The pedicle was then wrapped with xeroform gauze and dressed appropriately with a telfa and gauze bandage to ensure continued blood supply and protect the attached pedicle.
Rotation Flap Text: The defect edges were debeveled with a #15 scalpel blade.  Given the location of the defect, shape of the defect and the proximity to free margins a rotation flap was deemed most appropriate.  Using a sterile surgical marker, an appropriate rotation flap was drawn incorporating the defect and placing the expected incisions within the relaxed skin tension lines where possible.    The area thus outlined was incised deep to adipose tissue with a #15 scalpel blade.  The skin margins were undermined to an appropriate distance in all directions utilizing iris scissors.
Consent was obtained from the patient. The risks and benefits to therapy were discussed in detail. Specifically, the risks of infection, scarring, bleeding, prolonged wound healing, incomplete removal, allergy to anesthesia, nerve injury and recurrence were addressed. Prior to the procedure, the treatment site was clearly identified and confirmed by the patient. All components of Universal Protocol/PAUSE Rule completed.
Complex Repair And A-T Advancement Flap Text: The defect edges were debeveled with a #15 scalpel blade.  The primary defect was closed partially with a complex linear closure.  Given the location of the remaining defect, shape of the defect and the proximity to free margins an A-T advancement flap was deemed most appropriate for complete closure of the defect.  Using a sterile surgical marker, an appropriate advancement flap was drawn incorporating the defect and placing the expected incisions within the relaxed skin tension lines where possible.    The area thus outlined was incised deep to adipose tissue with a #15 scalpel blade.  The skin margins were undermined to an appropriate distance in all directions utilizing iris scissors.
O-L Flap Text: The defect edges were debeveled with a #15 scalpel blade.  Given the location of the defect, shape of the defect and the proximity to free margins an O-L flap was deemed most appropriate.  Using a sterile surgical marker, an appropriate advancement flap was drawn incorporating the defect and placing the expected incisions within the relaxed skin tension lines where possible.    The area thus outlined was incised deep to adipose tissue with a #15 scalpel blade.  The skin margins were undermined to an appropriate distance in all directions utilizing iris scissors.
Island Pedicle Flap With Canthal Suspension Text: The defect edges were debeveled with a #15 scalpel blade.  Given the location of the defect, shape of the defect and the proximity to free margins an island pedicle advancement flap was deemed most appropriate.  Using a sterile surgical marker, an appropriate advancement flap was drawn incorporating the defect, outlining the appropriate donor tissue and placing the expected incisions within the relaxed skin tension lines where possible. The area thus outlined was incised deep to adipose tissue with a #15 scalpel blade.  The skin margins were undermined to an appropriate distance in all directions around the primary defect and laterally outward around the island pedicle utilizing iris scissors.  There was minimal undermining beneath the pedicle flap. A suspension suture was placed in the canthal tendon to prevent tension and prevent ectropion.
Complex Repair And V-Y Plasty Text: The defect edges were debeveled with a #15 scalpel blade.  The primary defect was closed partially with a complex linear closure.  Given the location of the remaining defect, shape of the defect and the proximity to free margins a V-Y plasty was deemed most appropriate for complete closure of the defect.  Using a sterile surgical marker, an appropriate advancement flap was drawn incorporating the defect and placing the expected incisions within the relaxed skin tension lines where possible.    The area thus outlined was incised deep to adipose tissue with a #15 scalpel blade.  The skin margins were undermined to an appropriate distance in all directions utilizing iris scissors.
Hemigard Intro: Due to skin fragility and wound tension, it was decided to use HEMIGARD adhesive retention suture devices to permit a linear closure. The skin was cleaned and dried for a 6cm distance away from the wound. Excessive hair, if present, was removed to allow for adhesion.
Dermal Autograft Text: The defect edges were debeveled with a #15 scalpel blade.  Given the location of the defect, shape of the defect and the proximity to free margins a dermal autograft was deemed most appropriate.  Using a sterile surgical marker, the primary defect shape was transferred to the donor site. The area thus outlined was incised deep to adipose tissue with a #15 scalpel blade.  The harvested graft was then trimmed of adipose and epidermal tissue until only dermis was left.  The skin graft was then placed in the primary defect and oriented appropriately.
Repair Performed By Another Provider Text (Leave Blank If You Do Not Want): After the tissue was excised the defect was repaired by another provider.

## 2022-01-05 ENCOUNTER — HOSPITAL ENCOUNTER (OUTPATIENT)
Dept: CT IMAGING | Age: 72
Discharge: HOME | End: 2022-01-05
Attending: INTERNAL MEDICINE
Payer: MEDICARE

## 2022-01-05 VITALS — WEIGHT: 174 LBS | BODY MASS INDEX: 23.57 KG/M2 | HEIGHT: 72 IN

## 2022-01-05 DIAGNOSIS — Z12.2 SCREENING FOR RESPIRATORY ORGAN CANCER: ICD-10-CM

## 2022-01-05 DIAGNOSIS — Z87.891 FORMER SMOKER: ICD-10-CM

## 2022-01-05 PROCEDURE — 71271 CT THORAX LUNG CANCER SCR C-: CPT

## 2022-02-09 ENCOUNTER — APPOINTMENT (RX ONLY)
Dept: URBAN - METROPOLITAN AREA CLINIC 23 | Facility: CLINIC | Age: 72
Setting detail: DERMATOLOGY
End: 2022-02-09

## 2022-02-09 ENCOUNTER — RX ONLY (OUTPATIENT)
Age: 72
Setting detail: RX ONLY
End: 2022-02-09

## 2022-02-09 DIAGNOSIS — L72.8 OTHER FOLLICULAR CYSTS OF THE SKIN AND SUBCUTANEOUS TISSUE: ICD-10-CM

## 2022-02-09 DIAGNOSIS — L57.0 ACTINIC KERATOSIS: ICD-10-CM

## 2022-02-09 DIAGNOSIS — Z85.828 PERSONAL HISTORY OF OTHER MALIGNANT NEOPLASM OF SKIN: ICD-10-CM | Status: STABLE

## 2022-02-09 DIAGNOSIS — L57.8 OTHER SKIN CHANGES DUE TO CHRONIC EXPOSURE TO NONIONIZING RADIATION: ICD-10-CM

## 2022-02-09 PROCEDURE — 99213 OFFICE O/P EST LOW 20 MIN: CPT | Mod: 25

## 2022-02-09 PROCEDURE — 17000 DESTRUCT PREMALG LESION: CPT

## 2022-02-09 PROCEDURE — ? TREATMENT REGIMEN

## 2022-02-09 PROCEDURE — ? DEFER

## 2022-02-09 PROCEDURE — 17003 DESTRUCT PREMALG LES 2-14: CPT

## 2022-02-09 PROCEDURE — ? LIQUID NITROGEN

## 2022-02-09 PROCEDURE — ? COUNSELING

## 2022-02-09 RX ORDER — CEPHALEXIN 500 MG/1
TABLET ORAL
Qty: 20 | Refills: 0 | Status: ERX | COMMUNITY
Start: 2022-02-09

## 2022-02-09 ASSESSMENT — LOCATION DETAILED DESCRIPTION DERM
LOCATION DETAILED: LEFT BUTTOCK
LOCATION DETAILED: LEFT MEDIAL MALAR CHEEK
LOCATION DETAILED: LEFT FOREHEAD
LOCATION DETAILED: RIGHT SUPERIOR LATERAL MALAR CHEEK
LOCATION DETAILED: RIGHT CENTRAL MALAR CHEEK
LOCATION DETAILED: LEFT INFERIOR TEMPLE
LOCATION DETAILED: SUPERIOR MID FOREHEAD
LOCATION DETAILED: RIGHT FOREHEAD
LOCATION DETAILED: LEFT DISTAL DORSAL FOREARM

## 2022-02-09 ASSESSMENT — LOCATION SIMPLE DESCRIPTION DERM
LOCATION SIMPLE: LEFT TEMPLE
LOCATION SIMPLE: SUPERIOR FOREHEAD
LOCATION SIMPLE: LEFT FOREARM
LOCATION SIMPLE: RIGHT CHEEK
LOCATION SIMPLE: RIGHT FOREHEAD
LOCATION SIMPLE: LEFT CHEEK
LOCATION SIMPLE: LEFT FOREHEAD
LOCATION SIMPLE: LEFT BUTTOCK

## 2022-02-09 ASSESSMENT — LOCATION ZONE DERM
LOCATION ZONE: TRUNK
LOCATION ZONE: FACE
LOCATION ZONE: ARM

## 2022-02-09 NOTE — PROCEDURE: LIQUID NITROGEN
Show Applicator Variable?: Yes
Detail Level: Detailed
Render Note In Bullet Format When Appropriate: No
Duration Of Freeze Thaw-Cycle (Seconds): 0
Post-Care Instructions: I reviewed with the patient in detail post-care instructions. Patient is to wear sunprotection, and avoid picking at any of the treated lesions. Pt may apply Vaseline to crusted or scabbing areas.
Consent: The patient's consent was obtained including but not limited to risks of crusting, scabbing, blistering, scarring, darker or lighter pigmentary change, recurrence, incomplete removal and infection.

## 2022-02-09 NOTE — PROCEDURE: TREATMENT REGIMEN
Plan: Will schedule excision for 3 weeks from now.
Detail Level: Detailed
Initiate Treatment: Keflex 500mg bid x 10 days

## 2022-03-02 ENCOUNTER — APPOINTMENT (RX ONLY)
Dept: URBAN - METROPOLITAN AREA CLINIC 23 | Facility: CLINIC | Age: 72
Setting detail: DERMATOLOGY
End: 2022-03-02

## 2022-03-02 DIAGNOSIS — L72.8 OTHER FOLLICULAR CYSTS OF THE SKIN AND SUBCUTANEOUS TISSUE: ICD-10-CM

## 2022-03-02 PROCEDURE — 13101 CMPLX RPR TRUNK 2.6-7.5 CM: CPT

## 2022-03-02 PROCEDURE — ? EXCISION

## 2022-03-02 PROCEDURE — 11403 EXC TR-EXT B9+MARG 2.1-3CM: CPT

## 2022-03-02 PROCEDURE — ? OTHER

## 2022-03-02 ASSESSMENT — LOCATION ZONE DERM: LOCATION ZONE: TRUNK

## 2022-03-02 ASSESSMENT — LOCATION DETAILED DESCRIPTION DERM: LOCATION DETAILED: LEFT BUTTOCK

## 2022-03-02 ASSESSMENT — LOCATION SIMPLE DESCRIPTION DERM: LOCATION SIMPLE: LEFT BUTTOCK

## 2022-03-02 NOTE — PROCEDURE: OTHER
Render Risk Assessment In Note?: no
Detail Level: Simple
Note Text (......Xxx Chief Complaint.): This diagnosis correlates with the
Other (Free Text): Sutures will be removed at home

## 2022-03-02 NOTE — PROCEDURE: EXCISION
Helical Rim Text: The closure involved the helical rim.
Lazy S Intermediate Repair Preamble Text (Leave Blank If You Do Not Want): Undermining was performed with blunt dissection.
Complex Repair And Epidermal Autograft Text: The defect edges were debeveled with a #15 scalpel blade.  The primary defect was closed partially with a complex linear closure.  Given the location of the defect, shape of the defect and the proximity to free margins an epidermal autograft was deemed most appropriate to repair the remaining defect.  The graft was trimmed to fit the size of the remaining defect.  The graft was then placed in the primary defect, oriented appropriately, and sutured into place.
Lip Wedge Excision Repair Text: Given the location of the defect and the proximity to free margins a full thickness wedge repair was deemed most appropriate.  Using a sterile surgical marker, the appropriate repair was drawn incorporating the defect and placing the expected incisions perpendicular to the vermillion border.  The vermillion border was also meticulously outlined to ensure appropriate reapproximation during the repair.  The area thus outlined was incised through and through with a #15 scalpel blade.  The muscularis and dermis were reaproximated with deep sutures following hemostasis. Care was taken to realign the vermillion border before proceeding with the superficial closure.  Once the vermillion was realigned the superfical and mucosal closure was finished.
Cheek Interpolation Flap Text: A decision was made to reconstruct the defect utilizing an interpolation axial flap and a staged reconstruction.  A telfa template was made of the defect.  This telfa template was then used to outline the Cheek Interpolation flap.  The donor area for the pedicle flap was then injected with anesthesia.  The flap was excised through the skin and subcutaneous tissue down to the layer of the underlying musculature.  The interpolation flap was carefully excised within this deep plane to maintain its blood supply.  The edges of the donor site were undermined.   The donor site was closed in a primary fashion.  The pedicle was then rotated into position and sutured.  Once the tube was sutured into place, adequate blood supply was confirmed with blanching and refill.  The pedicle was then wrapped with xeroform gauze and dressed appropriately with a telfa and gauze bandage to ensure continued blood supply and protect the attached pedicle.
Complex Repair And W Plasty Text: The defect edges were debeveled with a #15 scalpel blade.  The primary defect was closed partially with a complex linear closure.  Given the location of the remaining defect, shape of the defect and the proximity to free margins a W plasty was deemed most appropriate for complete closure of the defect.  Using a sterile surgical marker, an appropriate advancement flap was drawn incorporating the defect and placing the expected incisions within the relaxed skin tension lines where possible.    The area thus outlined was incised deep to adipose tissue with a #15 scalpel blade.  The skin margins were undermined to an appropriate distance in all directions utilizing iris scissors.
Repair Performed By Another Provider Text (Leave Blank If You Do Not Want): After the tissue was excised the defect was repaired by another provider.
Render The Repair Note As A Separate Paragraph: No
Tissue Cultured Epidermal Autograft Text: The defect edges were debeveled with a #15 scalpel blade.  Given the location of the defect, shape of the defect and the proximity to free margins a tissue cultured epidermal autograft was deemed most appropriate.  The graft was then trimmed to fit the size of the defect.  The graft was then placed in the primary defect and oriented appropriately.
Suturegard Retention Suture: 2-0 Nylon
Dressing: pressure dressing
Complex Requirements: Extensive Undermining Performed?: Yes
Deep Sutures: 4-0 Vicryl
Length To Time In Minutes Device Was In Place: 10
Anesthesia Volume In Cc: 5
O-T Advancement Flap Text: The defect edges were debeveled with a #15 scalpel blade.  Given the location of the defect, shape of the defect and the proximity to free margins an O-T advancement flap was deemed most appropriate.  Using a sterile surgical marker, an appropriate advancement flap was drawn incorporating the defect and placing the expected incisions within the relaxed skin tension lines where possible.    The area thus outlined was incised deep to adipose tissue with a #15 scalpel blade.  The skin margins were undermined to an appropriate distance in all directions utilizing iris scissors.
Rotation Flap Text: The defect edges were debeveled with a #15 scalpel blade.  Given the location of the defect, shape of the defect and the proximity to free margins a rotation flap was deemed most appropriate.  Using a sterile surgical marker, an appropriate rotation flap was drawn incorporating the defect and placing the expected incisions within the relaxed skin tension lines where possible.    The area thus outlined was incised deep to adipose tissue with a #15 scalpel blade.  The skin margins were undermined to an appropriate distance in all directions utilizing iris scissors.
Second Skin Substitute Units (Will Override Primary Defect Units If Greater Than 0): 0
Mercedes Flap Text: The defect edges were debeveled with a #15 scalpel blade.  Given the location of the defect, shape of the defect and the proximity to free margins a Mercedes flap was deemed most appropriate.  Using a sterile surgical marker, an appropriate advancement flap was drawn incorporating the defect and placing the expected incisions within the relaxed skin tension lines where possible. The area thus outlined was incised deep to adipose tissue with a #15 scalpel blade.  The skin margins were undermined to an appropriate distance in all directions utilizing iris scissors.
Transposition Flap Text: The defect edges were debeveled with a #15 scalpel blade.  Given the location of the defect and the proximity to free margins a transposition flap was deemed most appropriate.  Using a sterile surgical marker, an appropriate transposition flap was drawn incorporating the defect.    The area thus outlined was incised deep to adipose tissue with a #15 scalpel blade.  The skin margins were undermined to an appropriate distance in all directions utilizing iris scissors.
Trilobed Flap Text: The defect edges were debeveled with a #15 scalpel blade.  Given the location of the defect and the proximity to free margins a trilobed flap was deemed most appropriate.  Using a sterile surgical marker, an appropriate trilobed flap drawn around the defect.    The area thus outlined was incised deep to adipose tissue with a #15 scalpel blade.  The skin margins were undermined to an appropriate distance in all directions utilizing iris scissors.
Bi-Rhombic Flap Text: The defect edges were debeveled with a #15 scalpel blade.  Given the location of the defect and the proximity to free margins a bi-rhombic flap was deemed most appropriate.  Using a sterile surgical marker, an appropriate rhombic flap was drawn incorporating the defect. The area thus outlined was incised deep to adipose tissue with a #15 scalpel blade.  The skin margins were undermined to an appropriate distance in all directions utilizing iris scissors.
Suture Removal: 14 days
Complex Repair And Rhombic Flap Text: The defect edges were debeveled with a #15 scalpel blade.  The primary defect was closed partially with a complex linear closure.  Given the location of the remaining defect, shape of the defect and the proximity to free margins a rhombic flap was deemed most appropriate for complete closure of the defect.  Using a sterile surgical marker, an appropriate advancement flap was drawn incorporating the defect and placing the expected incisions within the relaxed skin tension lines where possible.    The area thus outlined was incised deep to adipose tissue with a #15 scalpel blade.  The skin margins were undermined to an appropriate distance in all directions utilizing iris scissors.
Rhomboid Transposition Flap Text: The defect edges were debeveled with a #15 scalpel blade.  Given the location of the defect and the proximity to free margins a rhomboid transposition flap was deemed most appropriate.  Using a sterile surgical marker, an appropriate rhomboid flap was drawn incorporating the defect.    The area thus outlined was incised deep to adipose tissue with a #15 scalpel blade.  The skin margins were undermined to an appropriate distance in all directions utilizing iris scissors.
Chonodrocutaneous Helical Advancement Flap Text: The defect edges were debeveled with a #15 scalpel blade.  Given the location of the defect and the proximity to free margins a chondrocutaneous helical advancement flap was deemed most appropriate.  Using a sterile surgical marker, the appropriate advancement flap was drawn incorporating the defect and placing the expected incisions within the relaxed skin tension lines where possible.    The area thus outlined was incised deep to adipose tissue with a #15 scalpel blade.  The skin margins were undermined to an appropriate distance in all directions utilizing iris scissors.
Billing Type: Third-Party Bill
Hemostasis: Electrocautery
Paramedian Forehead Flap Text: A decision was made to reconstruct the defect utilizing an interpolation axial flap and a staged reconstruction.  A telfa template was made of the defect.  This telfa template was then used to outline the paramedian forehead pedicle flap.  The donor area for the pedicle flap was then injected with anesthesia.  The flap was excised through the skin and subcutaneous tissue down to the layer of the underlying musculature.  The pedicle flap was carefully excised within this deep plane to maintain its blood supply.  The edges of the donor site were undermined.   The donor site was closed in a primary fashion.  The pedicle was then rotated into position and sutured.  Once the tube was sutured into place, adequate blood supply was confirmed with blanching and refill.  The pedicle was then wrapped with xeroform gauze and dressed appropriately with a telfa and gauze bandage to ensure continued blood supply and protect the attached pedicle.
Saucerization Depth: dermis and superficial adipose tissue
Complex Repair And Double M Plasty Text: The defect edges were debeveled with a #15 scalpel blade.  The primary defect was closed partially with a complex linear closure.  Given the location of the remaining defect, shape of the defect and the proximity to free margins a double M plasty was deemed most appropriate for complete closure of the defect.  Using a sterile surgical marker, an appropriate advancement flap was drawn incorporating the defect and placing the expected incisions within the relaxed skin tension lines where possible.    The area thus outlined was incised deep to adipose tissue with a #15 scalpel blade.  The skin margins were undermined to an appropriate distance in all directions utilizing iris scissors.
Medical Necessity Clause: This procedure was medically necessary because the lesion that was treated was:
Complex Repair And Single Advancement Flap Text: The defect edges were debeveled with a #15 scalpel blade.  The primary defect was closed partially with a complex linear closure.  Given the location of the remaining defect, shape of the defect and the proximity to free margins a single advancement flap was deemed most appropriate for complete closure of the defect.  Using a sterile surgical marker, an appropriate advancement flap was drawn incorporating the defect and placing the expected incisions within the relaxed skin tension lines where possible.    The area thus outlined was incised deep to adipose tissue with a #15 scalpel blade.  The skin margins were undermined to an appropriate distance in all directions utilizing iris scissors.
Perilesional Excision Additional Text (Leave Blank If You Do Not Want): The margin was drawn around the clinically apparent lesion. Incisions were then made along these lines to the appropriate tissue plane and the lesion was extirpated.
Z Plasty Text: The lesion was extirpated to the level of the fat with a #15 scalpel blade.  Given the location of the defect, shape of the defect and the proximity to free margins a Z-plasty was deemed most appropriate for repair.  Using a sterile surgical marker, the appropriate transposition arms of the Z-plasty were drawn incorporating the defect and placing the expected incisions within the relaxed skin tension lines where possible.    The area thus outlined was incised deep to adipose tissue with a #15 scalpel blade.  The skin margins were undermined to an appropriate distance in all directions utilizing iris scissors.  The opposing transposition arms were then transposed into place in opposite direction and anchored with interrupted buried subcutaneous sutures.
Accession #: [id number]
Undermining Type: Entire Wound
A-T Advancement Flap Text: The defect edges were debeveled with a #15 scalpel blade.  Given the location of the defect, shape of the defect and the proximity to free margins an A-T advancement flap was deemed most appropriate.  Using a sterile surgical marker, an appropriate advancement flap was drawn incorporating the defect and placing the expected incisions within the relaxed skin tension lines where possible.    The area thus outlined was incised deep to adipose tissue with a #15 scalpel blade.  The skin margins were undermined to an appropriate distance in all directions utilizing iris scissors.
Complex Repair And Dermal Autograft Text: The defect edges were debeveled with a #15 scalpel blade.  The primary defect was closed partially with a complex linear closure.  Given the location of the defect, shape of the defect and the proximity to free margins an dermal autograft was deemed most appropriate to repair the remaining defect.  The graft was trimmed to fit the size of the remaining defect.  The graft was then placed in the primary defect, oriented appropriately, and sutured into place.
Size Of Margin In Cm: 0.2
Wound Care: Vaseline
Bilobed Transposition Flap Text: The defect edges were debeveled with a #15 scalpel blade.  Given the location of the defect and the proximity to free margins a bilobed transposition flap was deemed most appropriate.  Using a sterile surgical marker, an appropriate bilobe flap drawn around the defect.    The area thus outlined was incised deep to adipose tissue with a #15 scalpel blade.  The skin margins were undermined to an appropriate distance in all directions utilizing iris scissors.
Star Wedge Flap Text: The defect edges were debeveled with a #15 scalpel blade.  Given the location of the defect, shape of the defect and the proximity to free margins a star wedge flap was deemed most appropriate.  Using a sterile surgical marker, an appropriate rotation flap was drawn incorporating the defect and placing the expected incisions within the relaxed skin tension lines where possible. The area thus outlined was incised deep to adipose tissue with a #15 scalpel blade.  The skin margins were undermined to an appropriate distance in all directions utilizing iris scissors.
Double Island Pedicle Flap Text: The defect edges were debeveled with a #15 scalpel blade.  Given the location of the defect, shape of the defect and the proximity to free margins a double island pedicle advancement flap was deemed most appropriate.  Using a sterile surgical marker, an appropriate advancement flap was drawn incorporating the defect, outlining the appropriate donor tissue and placing the expected incisions within the relaxed skin tension lines where possible.    The area thus outlined was incised deep to adipose tissue with a #15 scalpel blade.  The skin margins were undermined to an appropriate distance in all directions around the primary defect and laterally outward around the island pedicle utilizing iris scissors.  There was minimal undermining beneath the pedicle flap.
Scalpel Size: 15 blade
Suturegard Body: The suture ends were repeatedly re-tightened and re-clamped to achieve the desired tissue expansion.
Bilobed Flap Text: The defect edges were debeveled with a #15 scalpel blade.  Given the location of the defect and the proximity to free margins a bilobe flap was deemed most appropriate.  Using a sterile surgical marker, an appropriate bilobe flap drawn around the defect.    The area thus outlined was incised deep to adipose tissue with a #15 scalpel blade.  The skin margins were undermined to an appropriate distance in all directions utilizing iris scissors.
Staged Advancement Flap Text: The defect edges were debeveled with a #15 scalpel blade.  Given the location of the defect, shape of the defect and the proximity to free margins a staged advancement flap was deemed most appropriate.  Using a sterile surgical marker, an appropriate advancement flap was drawn incorporating the defect and placing the expected incisions within the relaxed skin tension lines where possible. The area thus outlined was incised deep to adipose tissue with a #15 scalpel blade.  The skin margins were undermined to an appropriate distance in all directions utilizing iris scissors.
V-Y Plasty Text: The defect edges were debeveled with a #15 scalpel blade.  Given the location of the defect, shape of the defect and the proximity to free margins an V-Y advancement flap was deemed most appropriate.  Using a sterile surgical marker, an appropriate advancement flap was drawn incorporating the defect and placing the expected incisions within the relaxed skin tension lines where possible.    The area thus outlined was incised deep to adipose tissue with a #15 scalpel blade.  The skin margins were undermined to an appropriate distance in all directions utilizing iris scissors.
Complex Repair And Melolabial Flap Text: The defect edges were debeveled with a #15 scalpel blade.  The primary defect was closed partially with a complex linear closure.  Given the location of the remaining defect, shape of the defect and the proximity to free margins a melolabial flap was deemed most appropriate for complete closure of the defect.  Using a sterile surgical marker, an appropriate advancement flap was drawn incorporating the defect and placing the expected incisions within the relaxed skin tension lines where possible.    The area thus outlined was incised deep to adipose tissue with a #15 scalpel blade.  The skin margins were undermined to an appropriate distance in all directions utilizing iris scissors.
Burow's Advancement Flap Text: The defect edges were debeveled with a #15 scalpel blade.  Given the location of the defect and the proximity to free margins a Burow's advancement flap was deemed most appropriate.  Using a sterile surgical marker, the appropriate advancement flap was drawn incorporating the defect and placing the expected incisions within the relaxed skin tension lines where possible.    The area thus outlined was incised deep to adipose tissue with a #15 scalpel blade.  The skin margins were undermined to an appropriate distance in all directions utilizing iris scissors.
Rhombic Flap Text: The defect edges were debeveled with a #15 scalpel blade.  Given the location of the defect and the proximity to free margins a rhombic flap was deemed most appropriate.  Using a sterile surgical marker, an appropriate rhombic flap was drawn incorporating the defect.    The area thus outlined was incised deep to adipose tissue with a #15 scalpel blade.  The skin margins were undermined to an appropriate distance in all directions utilizing iris scissors.
Complex Repair And Xenograft Text: The defect edges were debeveled with a #15 scalpel blade.  The primary defect was closed partially with a complex linear closure.  Given the location of the defect, shape of the defect and the proximity to free margins an tissue cultured epidermal autograft was deemed most appropriate to repair the remaining defect.  The graft was trimmed to fit the size of the remaining defect.  The graft was then placed in the primary defect, oriented appropriately, and sutured into place.
Double M-Plasty Complex Repair Preamble Text (Leave Blank If You Do Not Want): Extensive wide undermining was performed.
Posterior Auricular Interpolation Flap Text: A decision was made to reconstruct the defect utilizing an interpolation axial flap and a staged reconstruction.  A telfa template was made of the defect.  This telfa template was then used to outline the posterior auricular interpolation flap.  The donor area for the pedicle flap was then injected with anesthesia.  The flap was excised through the skin and subcutaneous tissue down to the layer of the underlying musculature.  The pedicle flap was carefully excised within this deep plane to maintain its blood supply.  The edges of the donor site were undermined.   The donor site was closed in a primary fashion.  The pedicle was then rotated into position and sutured.  Once the tube was sutured into place, adequate blood supply was confirmed with blanching and refill.  The pedicle was then wrapped with xeroform gauze and dressed appropriately with a telfa and gauze bandage to ensure continued blood supply and protect the attached pedicle.
Excision Depth: superficial fascia
Debridement Text: The wound edges were debrided prior to proceeding with the closure to facilitate wound healing.
Hemigard Postcare Instructions: The HEMIGARD strips are to remain completely dry for at least 5-7 days.
Purse String (Simple) Text: Given the location of the defect and the characteristics of the surrounding skin a purse string simple closure was deemed most appropriate.  Undermining was performed circumferentially around the surgical defect.  A purse string suture was then placed and tightened.
Excisional Biopsy Additional Text (Leave Blank If You Do Not Want): The margin was drawn around the clinically apparent lesion.  An elliptical shape was then drawn on the skin incorporating the lesion and margins.  Incisions were then made along these lines to the appropriate tissue plane and the lesion was extirpated.
Distance Of Undermining In Cm (Required): 2
W Plasty Text: The lesion was extirpated to the level of the fat with a #15 scalpel blade.  Given the location of the defect, shape of the defect and the proximity to free margins a W-plasty was deemed most appropriate for repair.  Using a sterile surgical marker, the appropriate transposition arms of the W-plasty were drawn incorporating the defect and placing the expected incisions within the relaxed skin tension lines where possible.    The area thus outlined was incised deep to adipose tissue with a #15 scalpel blade.  The skin margins were undermined to an appropriate distance in all directions utilizing iris scissors.  The opposing transposition arms were then transposed into place in opposite direction and anchored with interrupted buried subcutaneous sutures.
Epidermal Sutures: 4-0 Prolene
Information: Selecting Yes will display possible errors in your note based on the variables you have selected. This validation is only offered as a suggestion for you. PLEASE NOTE THAT THE VALIDATION TEXT WILL BE REMOVED WHEN YOU FINALIZE YOUR NOTE. IF YOU WANT TO FAX A PRELIMINARY NOTE YOU WILL NEED TO TOGGLE THIS TO 'NO' IF YOU DO NOT WANT IT IN YOUR FAXED NOTE.
Complex Repair And Skin Substitute Graft Text: The defect edges were debeveled with a #15 scalpel blade.  The primary defect was closed partially with a complex linear closure.  Given the location of the remaining defect, shape of the defect and the proximity to free margins a skin substitute graft was deemed most appropriate to repair the remaining defect.  The graft was trimmed to fit the size of the remaining defect.  The graft was then placed in the primary defect, oriented appropriately, and sutured into place.
Width Of Defect Perpendicular To Closure In Cm (Required): 1.8
Size Of Lesion In Cm: 2.5
Alar Island Pedicle Flap Text: The defect edges were debeveled with a #15 scalpel blade.  Given the location of the defect, shape of the defect and the proximity to the alar rim an island pedicle advancement flap was deemed most appropriate.  Using a sterile surgical marker, an appropriate advancement flap was drawn incorporating the defect, outlining the appropriate donor tissue and placing the expected incisions within the nasal ala running parallel to the alar rim. The area thus outlined was incised with a #15 scalpel blade.  The skin margins were undermined minimally to an appropriate distance in all directions around the primary defect and laterally outward around the island pedicle utilizing iris scissors.  There was minimal undermining beneath the pedicle flap.
Spiral Flap Text: The defect edges were debeveled with a #15 scalpel blade.  Given the location of the defect, shape of the defect and the proximity to free margins a spiral flap was deemed most appropriate.  Using a sterile surgical marker, an appropriate rotation flap was drawn incorporating the defect and placing the expected incisions within the relaxed skin tension lines where possible. The area thus outlined was incised deep to adipose tissue with a #15 scalpel blade.  The skin margins were undermined to an appropriate distance in all directions utilizing iris scissors.
Suturegard Intro: Intraoperative tissue expansion was performed, utilizing the SUTUREGARD device, in order to reduce wound tension.
Banner Transposition Flap Text: The defect edges were debeveled with a #15 scalpel blade.  Given the location of the defect and the proximity to free margins a Banner transposition flap was deemed most appropriate.  Using a sterile surgical marker, an appropriate flap drawn around the defect. The area thus outlined was incised deep to adipose tissue with a #15 scalpel blade.  The skin margins were undermined to an appropriate distance in all directions utilizing iris scissors.
Double O-Z Plasty Text: The defect edges were debeveled with a #15 scalpel blade.  Given the location of the defect, shape of the defect and the proximity to free margins a Double O-Z plasty (double transposition flap) was deemed most appropriate.  Using a sterile surgical marker, the appropriate transposition flaps were drawn incorporating the defect and placing the expected incisions within the relaxed skin tension lines where possible. The area thus outlined was incised deep to adipose tissue with a #15 scalpel blade.  The skin margins were undermined to an appropriate distance in all directions utilizing iris scissors.  Hemostasis was achieved with electrocautery.  The flaps were then transposed into place, one clockwise and the other counterclockwise, and anchored with interrupted buried subcutaneous sutures.
Cartilage Graft Text: The defect edges were debeveled with a #15 scalpel blade.  Given the location of the defect, shape of the defect, the fact the defect involved a full thickness cartilage defect a cartilage graft was deemed most appropriate.  An appropriate donor site was identified, cleansed, and anesthetized. The cartilage graft was then harvested and transferred to the recipient site, oriented appropriately and then sutured into place.  The secondary defect was then repaired using a primary closure.
Melolabial Transposition Flap Text: The defect edges were debeveled with a #15 scalpel blade.  Given the location of the defect and the proximity to free margins a melolabial flap was deemed most appropriate.  Using a sterile surgical marker, an appropriate melolabial transposition flap was drawn incorporating the defect.    The area thus outlined was incised deep to adipose tissue with a #15 scalpel blade.  The skin margins were undermined to an appropriate distance in all directions utilizing iris scissors.
Complex Repair And M Plasty Text: The defect edges were debeveled with a #15 scalpel blade.  The primary defect was closed partially with a complex linear closure.  Given the location of the remaining defect, shape of the defect and the proximity to free margins an M plasty was deemed most appropriate for complete closure of the defect.  Using a sterile surgical marker, an appropriate advancement flap was drawn incorporating the defect and placing the expected incisions within the relaxed skin tension lines where possible.    The area thus outlined was incised deep to adipose tissue with a #15 scalpel blade.  The skin margins were undermined to an appropriate distance in all directions utilizing iris scissors.
O-Z Plasty Text: The defect edges were debeveled with a #15 scalpel blade.  Given the location of the defect, shape of the defect and the proximity to free margins an O-Z plasty (double transposition flap) was deemed most appropriate.  Using a sterile surgical marker, the appropriate transposition flaps were drawn incorporating the defect and placing the expected incisions within the relaxed skin tension lines where possible.    The area thus outlined was incised deep to adipose tissue with a #15 scalpel blade.  The skin margins were undermined to an appropriate distance in all directions utilizing iris scissors.  Hemostasis was achieved with electrocautery.  The flaps were then transposed into place, one clockwise and the other counterclockwise, and anchored with interrupted buried subcutaneous sutures.
Complex Repair And O-L Flap Text: The defect edges were debeveled with a #15 scalpel blade.  The primary defect was closed partially with a complex linear closure.  Given the location of the remaining defect, shape of the defect and the proximity to free margins an O-L flap was deemed most appropriate for complete closure of the defect.  Using a sterile surgical marker, an appropriate flap was drawn incorporating the defect and placing the expected incisions within the relaxed skin tension lines where possible.    The area thus outlined was incised deep to adipose tissue with a #15 scalpel blade.  The skin margins were undermined to an appropriate distance in all directions utilizing iris scissors.
Purse String (Intermediate) Text: Given the location of the defect and the characteristics of the surrounding skin a pursestring intermediate closure was deemed most appropriate.  Undermining was performed circumfirentially around the surgical defect.  A purstring suture was then placed and tightened.
V-Y Flap Text: The defect edges were debeveled with a #15 scalpel blade.  Given the location of the defect, shape of the defect and the proximity to free margins a V-Y flap was deemed most appropriate.  Using a sterile surgical marker, an appropriate advancement flap was drawn incorporating the defect and placing the expected incisions within the relaxed skin tension lines where possible.    The area thus outlined was incised deep to adipose tissue with a #15 scalpel blade.  The skin margins were undermined to an appropriate distance in all directions utilizing iris scissors.
Eye Clamp Note Details: An eye clamp was used during the procedure.
Slit Excision Additional Text (Leave Blank If You Do Not Want): A linear line was drawn on the skin overlying the lesion. An incision was made slowly until the lesion was visualized.  Once visualized, the lesion was removed with blunt dissection.
Saucerization Excision Additional Text (Leave Blank If You Do Not Want): The margin was drawn around the clinically apparent lesion.  Incisions were then made along these lines, in a tangential fashion, to the appropriate tissue plane and the lesion was extirpated.
Path Notes (To The Dermatopathologist): Please check margins
Xenograft Text: The defect edges were debeveled with a #15 scalpel blade.  Given the location of the defect, shape of the defect and the proximity to free margins a xenograft was deemed most appropriate.  The graft was then trimmed to fit the size of the defect.  The graft was then placed in the primary defect and oriented appropriately.
Double O-Z Flap Text: The defect edges were debeveled with a #15 scalpel blade.  Given the location of the defect, shape of the defect and the proximity to free margins a Double O-Z flap was deemed most appropriate.  Using a sterile surgical marker, an appropriate transposition flap was drawn incorporating the defect and placing the expected incisions within the relaxed skin tension lines where possible. The area thus outlined was incised deep to adipose tissue with a #15 scalpel blade.  The skin margins were undermined to an appropriate distance in all directions utilizing iris scissors.
Peng Advancement Flap Text: The defect edges were debeveled with a #15 scalpel blade.  Given the location of the defect, shape of the defect and the proximity to free margins a Peng advancement flap was deemed most appropriate.  Using a sterile surgical marker, an appropriate advancement flap was drawn incorporating the defect and placing the expected incisions within the relaxed skin tension lines where possible. The area thus outlined was incised deep to adipose tissue with a #15 scalpel blade.  The skin margins were undermined to an appropriate distance in all directions utilizing iris scissors.
Island Pedicle Flap With Canthal Suspension Text: The defect edges were debeveled with a #15 scalpel blade.  Given the location of the defect, shape of the defect and the proximity to free margins an island pedicle advancement flap was deemed most appropriate.  Using a sterile surgical marker, an appropriate advancement flap was drawn incorporating the defect, outlining the appropriate donor tissue and placing the expected incisions within the relaxed skin tension lines where possible. The area thus outlined was incised deep to adipose tissue with a #15 scalpel blade.  The skin margins were undermined to an appropriate distance in all directions around the primary defect and laterally outward around the island pedicle utilizing iris scissors.  There was minimal undermining beneath the pedicle flap. A suspension suture was placed in the canthal tendon to prevent tension and prevent ectropion.
Zygomaticofacial Flap Text: Given the location of the defect, shape of the defect and the proximity to free margins a zygomaticofacial flap was deemed most appropriate for repair.  Using a sterile surgical marker, the appropriate flap was drawn incorporating the defect and placing the expected incisions within the relaxed skin tension lines where possible. The area thus outlined was incised deep to adipose tissue with a #15 scalpel blade with preservation of a vascular pedicle.  The skin margins were undermined to an appropriate distance in all directions utilizing iris scissors.  The flap was then placed into the defect and anchored with interrupted buried subcutaneous sutures.
Retention Suture Text: Retention sutures were placed to support the closure and prevent dehiscence.
Epidermal Closure Graft Donor Site (Optional): simple interrupted
Ear Star Wedge Flap Text: The defect edges were debeveled with a #15 blade scalpel.  Given the location of the defect and the proximity to free margins (helical rim) an ear star wedge flap was deemed most appropriate.  Using a sterile surgical marker, the appropriate flap was drawn incorporating the defect and placing the expected incisions between the helical rim and antihelix where possible.  The area thus outlined was incised through and through with a #15 scalpel blade.
Advancement Flap (Double) Text: The defect edges were debeveled with a #15 scalpel blade.  Given the location of the defect and the proximity to free margins a double advancement flap was deemed most appropriate.  Using a sterile surgical marker, the appropriate advancement flaps were drawn incorporating the defect and placing the expected incisions within the relaxed skin tension lines where possible.    The area thus outlined was incised deep to adipose tissue with a #15 scalpel blade.  The skin margins were undermined to an appropriate distance in all directions utilizing iris scissors.
Complex Repair And Burow's Graft Text: The defect edges were debeveled with a #15 scalpel blade.  The primary defect was closed partially with a complex linear closure.  Given the location of the defect, shape of the defect, the proximity to free margins and the presence of a standing cone deformity a Burow's graft was deemed most appropriate to repair the remaining defect.  The graft was trimmed to fit the size of the remaining defect.  The graft was then placed in the primary defect, oriented appropriately, and sutured into place.
Mastoid Interpolation Flap Text: A decision was made to reconstruct the defect utilizing an interpolation axial flap and a staged reconstruction.  A telfa template was made of the defect.  This telfa template was then used to outline the mastoid interpolation flap.  The donor area for the pedicle flap was then injected with anesthesia.  The flap was excised through the skin and subcutaneous tissue down to the layer of the underlying musculature.  The pedicle flap was carefully excised within this deep plane to maintain its blood supply.  The edges of the donor site were undermined.   The donor site was closed in a primary fashion.  The pedicle was then rotated into position and sutured.  Once the tube was sutured into place, adequate blood supply was confirmed with blanching and refill.  The pedicle was then wrapped with xeroform gauze and dressed appropriately with a telfa and gauze bandage to ensure continued blood supply and protect the attached pedicle.
Epidermal Closure: running
Complex Repair And V-Y Plasty Text: The defect edges were debeveled with a #15 scalpel blade.  The primary defect was closed partially with a complex linear closure.  Given the location of the remaining defect, shape of the defect and the proximity to free margins a V-Y plasty was deemed most appropriate for complete closure of the defect.  Using a sterile surgical marker, an appropriate advancement flap was drawn incorporating the defect and placing the expected incisions within the relaxed skin tension lines where possible.    The area thus outlined was incised deep to adipose tissue with a #15 scalpel blade.  The skin margins were undermined to an appropriate distance in all directions utilizing iris scissors.
Anesthesia Type: 1% lidocaine with epinephrine
Estimated Blood Loss (Cc): minimal
Where Do You Want The Question To Include Opioid Counseling Located?: Case Summary Tab
Complex Repair And O-T Advancement Flap Text: The defect edges were debeveled with a #15 scalpel blade.  The primary defect was closed partially with a complex linear closure.  Given the location of the remaining defect, shape of the defect and the proximity to free margins an O-T advancement flap was deemed most appropriate for complete closure of the defect.  Using a sterile surgical marker, an appropriate advancement flap was drawn incorporating the defect and placing the expected incisions within the relaxed skin tension lines where possible.    The area thus outlined was incised deep to adipose tissue with a #15 scalpel blade.  The skin margins were undermined to an appropriate distance in all directions utilizing iris scissors.
Nasal Turnover Hinge Flap Text: The defect edges were debeveled with a #15 scalpel blade.  Given the size, depth, location of the defect and the defect being full thickness a nasal turnover hinge flap was deemed most appropriate.  Using a sterile surgical marker, an appropriate hinge flap was drawn incorporating the defect. The area thus outlined was incised with a #15 scalpel blade. The flap was designed to recreate the nasal mucosal lining and the alar rim. The skin margins were undermined to an appropriate distance in all directions utilizing iris scissors.
Hatchet Flap Text: The defect edges were debeveled with a #15 scalpel blade.  Given the location of the defect, shape of the defect and the proximity to free margins a hatchet flap was deemed most appropriate.  Using a sterile surgical marker, an appropriate hatchet flap was drawn incorporating the defect and placing the expected incisions within the relaxed skin tension lines where possible.    The area thus outlined was incised deep to adipose tissue with a #15 scalpel blade.  The skin margins were undermined to an appropriate distance in all directions utilizing iris scissors.
Complex Repair And A-T Advancement Flap Text: The defect edges were debeveled with a #15 scalpel blade.  The primary defect was closed partially with a complex linear closure.  Given the location of the remaining defect, shape of the defect and the proximity to free margins an A-T advancement flap was deemed most appropriate for complete closure of the defect.  Using a sterile surgical marker, an appropriate advancement flap was drawn incorporating the defect and placing the expected incisions within the relaxed skin tension lines where possible.    The area thus outlined was incised deep to adipose tissue with a #15 scalpel blade.  The skin margins were undermined to an appropriate distance in all directions utilizing iris scissors.
O-Z Flap Text: The defect edges were debeveled with a #15 scalpel blade.  Given the location of the defect, shape of the defect and the proximity to free margins an O-Z flap was deemed most appropriate.  Using a sterile surgical marker, an appropriate transposition flap was drawn incorporating the defect and placing the expected incisions within the relaxed skin tension lines where possible. The area thus outlined was incised deep to adipose tissue with a #15 scalpel blade.  The skin margins were undermined to an appropriate distance in all directions utilizing iris scissors.
Mustarde Flap Text: The defect edges were debeveled with a #15 scalpel blade.  Given the size, depth and location of the defect and the proximity to free margins a Mustarde flap was deemed most appropriate.  Using a sterile surgical marker, an appropriate flap was drawn incorporating the defect. The area thus outlined was incised with a #15 scalpel blade.  The skin margins were undermined to an appropriate distance in all directions utilizing iris scissors.
Repair Type: Complex
Mucosal Advancement Flap Text: Given the location of the defect, shape of the defect and the proximity to free margins a mucosal advancement flap was deemed most appropriate. Incisions were made with a 15 blade scalpel in the appropriate fashion along the cutaneous vermillion border and the mucosal lip. The remaining actinically damaged mucosal tissue was excised.  The mucosal advancement flap was then elevated to the gingival sulcus with care taken to preserve the neurovascular structures and advanced into the primary defect. Care was taken to ensure that precise realignment of the vermillion border was achieved.
Orbicularis Oris Muscle Flap Text: The defect edges were debeveled with a #15 scalpel blade.  Given that the defect affected the competency of the oral sphincter an obicularis oris muscle flap was deemed most appropriate to restore this competency and normal muscle function.  Using a sterile surgical marker, an appropriate flap was drawn incorporating the defect. The area thus outlined was incised with a #15 scalpel blade.
Burow's Graft Text: The defect edges were debeveled with a #15 scalpel blade.  Given the location of the defect, shape of the defect, the proximity to free margins and the presence of a standing cone deformity a Burow's skin graft was deemed most appropriate. The standing cone was removed and this tissue was then trimmed to the shape of the primary defect. The adipose tissue was also removed until only dermis and epidermis were left.  The skin margins of the secondary defect were undermined to an appropriate distance in all directions utilizing iris scissors.  The secondary defect was closed with interrupted buried subcutaneous sutures.  The skin edges were then re-apposed with running  sutures.  The skin graft was then placed in the primary defect and oriented appropriately.
Advancement Flap (Single) Text: The defect edges were debeveled with a #15 scalpel blade.  Given the location of the defect and the proximity to free margins a single advancement flap was deemed most appropriate.  Using a sterile surgical marker, an appropriate advancement flap was drawn incorporating the defect and placing the expected incisions within the relaxed skin tension lines where possible.    The area thus outlined was incised deep to adipose tissue with a #15 scalpel blade.  The skin margins were undermined to an appropriate distance in all directions utilizing iris scissors.
Melolabial Interpolation Flap Text: A decision was made to reconstruct the defect utilizing an interpolation axial flap and a staged reconstruction.  A telfa template was made of the defect.  This telfa template was then used to outline the melolabial interpolation flap.  The donor area for the pedicle flap was then injected with anesthesia.  The flap was excised through the skin and subcutaneous tissue down to the layer of the underlying musculature.  The pedicle flap was carefully excised within this deep plane to maintain its blood supply.  The edges of the donor site were undermined.   The donor site was closed in a primary fashion.  The pedicle was then rotated into position and sutured.  Once the tube was sutured into place, adequate blood supply was confirmed with blanching and refill.  The pedicle was then wrapped with xeroform gauze and dressed appropriately with a telfa and gauze bandage to ensure continued blood supply and protect the attached pedicle.
Home Suture Removal Text: Patient was provided a home suture removal kit and will remove their sutures at home.  If they have any questions or difficulties they will call the office.
Complex Repair And Ftsg Text: The defect edges were debeveled with a #15 scalpel blade.  The primary defect was closed partially with a complex linear closure.  Given the location of the defect, shape of the defect and the proximity to free margins a full thickness skin graft was deemed most appropriate to repair the remaining defect.  The graft was trimmed to fit the size of the remaining defect.  The graft was then placed in the primary defect, oriented appropriately, and sutured into place.
Retention Suture Bite Size: 3 mm
Complex Repair And Transposition Flap Text: The defect edges were debeveled with a #15 scalpel blade.  The primary defect was closed partially with a complex linear closure.  Given the location of the remaining defect, shape of the defect and the proximity to free margins a transposition flap was deemed most appropriate for complete closure of the defect.  Using a sterile surgical marker, an appropriate advancement flap was drawn incorporating the defect and placing the expected incisions within the relaxed skin tension lines where possible.    The area thus outlined was incised deep to adipose tissue with a #15 scalpel blade.  The skin margins were undermined to an appropriate distance in all directions utilizing iris scissors.
Crescentic Advancement Flap Text: The defect edges were debeveled with a #15 scalpel blade.  Given the location of the defect and the proximity to free margins a crescentic advancement flap was deemed most appropriate.  Using a sterile surgical marker, the appropriate advancement flap was drawn incorporating the defect and placing the expected incisions within the relaxed skin tension lines where possible.    The area thus outlined was incised deep to adipose tissue with a #15 scalpel blade.  The skin margins were undermined to an appropriate distance in all directions utilizing iris scissors.
H Plasty Text: Given the location of the defect, shape of the defect and the proximity to free margins a H-plasty was deemed most appropriate for repair.  Using a sterile surgical marker, the appropriate advancement arms of the H-plasty were drawn incorporating the defect and placing the expected incisions within the relaxed skin tension lines where possible. The area thus outlined was incised deep to adipose tissue with a #15 scalpel blade. The skin margins were undermined to an appropriate distance in all directions utilizing iris scissors.  The opposing advancement arms were then advanced into place in opposite direction and anchored with interrupted buried subcutaneous sutures.
Epidermal Autograft Text: The defect edges were debeveled with a #15 scalpel blade.  Given the location of the defect, shape of the defect and the proximity to free margins an epidermal autograft was deemed most appropriate.  Using a sterile surgical marker, the primary defect shape was transferred to the donor site. The epidermal graft was then harvested.  The skin graft was then placed in the primary defect and oriented appropriately.
Complex Repair And Rotation Flap Text: The defect edges were debeveled with a #15 scalpel blade.  The primary defect was closed partially with a complex linear closure.  Given the location of the remaining defect, shape of the defect and the proximity to free margins a rotation flap was deemed most appropriate for complete closure of the defect.  Using a sterile surgical marker, an appropriate advancement flap was drawn incorporating the defect and placing the expected incisions within the relaxed skin tension lines where possible.    The area thus outlined was incised deep to adipose tissue with a #15 scalpel blade.  The skin margins were undermined to an appropriate distance in all directions utilizing iris scissors.
Complex Repair And Modified Advancement Flap Text: The defect edges were debeveled with a #15 scalpel blade.  The primary defect was closed partially with a complex linear closure.  Given the location of the remaining defect, shape of the defect and the proximity to free margins a modified advancement flap was deemed most appropriate for complete closure of the defect.  Using a sterile surgical marker, an appropriate advancement flap was drawn incorporating the defect and placing the expected incisions within the relaxed skin tension lines where possible.    The area thus outlined was incised deep to adipose tissue with a #15 scalpel blade.  The skin margins were undermined to an appropriate distance in all directions utilizing iris scissors.
Fusiform Excision Additional Text (Leave Blank If You Do Not Want): The margin was drawn around the clinically apparent lesion.  A fusiform shape was then drawn on the skin incorporating the lesion and margins.  Incisions were then made along these lines to the appropriate tissue plane and the lesion was extirpated.
O-L Flap Text: The defect edges were debeveled with a #15 scalpel blade.  Given the location of the defect, shape of the defect and the proximity to free margins an O-L flap was deemed most appropriate.  Using a sterile surgical marker, an appropriate advancement flap was drawn incorporating the defect and placing the expected incisions within the relaxed skin tension lines where possible.    The area thus outlined was incised deep to adipose tissue with a #15 scalpel blade.  The skin margins were undermined to an appropriate distance in all directions utilizing iris scissors.
Consent was obtained from the patient. The risks and benefits to therapy were discussed in detail. Specifically, the risks of infection, scarring, bleeding, prolonged wound healing, incomplete removal, allergy to anesthesia, nerve injury and recurrence were addressed. Prior to the procedure, the treatment site was clearly identified and confirmed by the patient.
Island Pedicle Flap Text: The defect edges were debeveled with a #15 scalpel blade.  Given the location of the defect, shape of the defect and the proximity to free margins an island pedicle advancement flap was deemed most appropriate.  Using a sterile surgical marker, an appropriate advancement flap was drawn incorporating the defect, outlining the appropriate donor tissue and placing the expected incisions within the relaxed skin tension lines where possible.    The area thus outlined was incised deep to adipose tissue with a #15 scalpel blade.  The skin margins were undermined to an appropriate distance in all directions around the primary defect and laterally outward around the island pedicle utilizing iris scissors.  There was minimal undermining beneath the pedicle flap.
Bilateral Helical Rim Advancement Flap Text: The defect edges were debeveled with a #15 blade scalpel.  Given the location of the defect and the proximity to free margins (helical rim) a bilateral helical rim advancement flap was deemed most appropriate.  Using a sterile surgical marker, the appropriate advancement flaps were drawn incorporating the defect and placing the expected incisions between the helical rim and antihelix where possible.  The area thus outlined was incised through and through with a #15 scalpel blade.  With a skin hook and iris scissors, the flaps were gently and sharply undermined and freed up.
Skin Substitute Text: The defect edges were debeveled with a #15 scalpel blade.  Given the location of the defect, shape of the defect and the proximity to free margins a skin substitute graft was deemed most appropriate.  The graft material was trimmed to fit the size of the defect. The graft was then placed in the primary defect and oriented appropriately.
Nostril Rim Text: The closure involved the nostril rim.
Island Pedicle Flap-Requiring Vessel Identification Text: The defect edges were debeveled with a #15 scalpel blade.  Given the location of the defect, shape of the defect and the proximity to free margins an island pedicle advancement flap was deemed most appropriate.  Using a sterile surgical marker, an appropriate advancement flap was drawn, based on the axial vessel mentioned above, incorporating the defect, outlining the appropriate donor tissue and placing the expected incisions within the relaxed skin tension lines where possible.    The area thus outlined was incised deep to adipose tissue with a #15 scalpel blade.  The skin margins were undermined to an appropriate distance in all directions around the primary defect and laterally outward around the island pedicle utilizing iris scissors.  There was minimal undermining beneath the pedicle flap.
Nasalis-Muscle-Based Myocutaneous Island Pedicle Flap Text: Using a #15 blade, an incision was made around the donor flap to the level of the nasalis muscle. Wide lateral undermining was then performed in both the subcutaneous plane above the nasalis muscle, and in a submuscular plane just above periosteum. This allowed the formation of a free nasalis muscle axial pedicle (based on the angular artery) which was still attached to the actual cutaneous flap, increasing its mobility and vascular viability. Hemostasis was obtained with pinpoint electrocoagulation. The flap was mobilized into position and the pivotal anchor points positioned and stabilized with buried interrupted sutures. Subcutaneous and dermal tissues were closed in a multilayered fashion with sutures. Tissue redundancies were excised, and the epidermal edges were apposed without significant tension and sutured with sutures.
Split-Thickness Skin Graft Text: The defect edges were debeveled with a #15 scalpel blade.  Given the location of the defect, shape of the defect and the proximity to free margins a split thickness skin graft was deemed most appropriate.  Using a sterile surgical marker, the primary defect shape was transferred to the donor site. The split thickness graft was then harvested.  The skin graft was then placed in the primary defect and oriented appropriately.
Post-Care Instructions: I reviewed with the patient in detail post-care instructions. Patient is not to engage in any heavy lifting, exercise, or swimming for the next 14 days. Should the patient develop any fevers, chills, bleeding, severe pain patient will contact the office immediately.
Adjacent Tissue Transfer Text: The defect edges were debeveled with a #15 scalpel blade.  Given the location of the defect and the proximity to free margins an adjacent tissue transfer was deemed most appropriate.  Using a sterile surgical marker, an appropriate flap was drawn incorporating the defect and placing the expected incisions within the relaxed skin tension lines where possible.    The area thus outlined was incised deep to adipose tissue with a #15 scalpel blade.  The skin margins were undermined to an appropriate distance in all directions utilizing iris scissors.
Complex Repair And Dorsal Nasal Flap Text: The defect edges were debeveled with a #15 scalpel blade.  The primary defect was closed partially with a complex linear closure.  Given the location of the remaining defect, shape of the defect and the proximity to free margins a dorsal nasal flap was deemed most appropriate for complete closure of the defect.  Using a sterile surgical marker, an appropriate flap was drawn incorporating the defect and placing the expected incisions within the relaxed skin tension lines where possible.    The area thus outlined was incised deep to adipose tissue with a #15 scalpel blade.  The skin margins were undermined to an appropriate distance in all directions utilizing iris scissors.
Interpolation Flap Text: A decision was made to reconstruct the defect utilizing an interpolation axial flap and a staged reconstruction.  A telfa template was made of the defect.  This telfa template was then used to outline the interpolation flap.  The donor area for the pedicle flap was then injected with anesthesia.  The flap was excised through the skin and subcutaneous tissue down to the layer of the underlying musculature.  The interpolation flap was carefully excised within this deep plane to maintain its blood supply.  The edges of the donor site were undermined.   The donor site was closed in a primary fashion.  The pedicle was then rotated into position and sutured.  Once the tube was sutured into place, adequate blood supply was confirmed with blanching and refill.  The pedicle was then wrapped with xeroform gauze and dressed appropriately with a telfa and gauze bandage to ensure continued blood supply and protect the attached pedicle.
Hemigard Intro: Due to skin fragility and wound tension, it was decided to use HEMIGARD adhesive retention suture devices to permit a linear closure. The skin was cleaned and dried for a 6cm distance away from the wound. Excessive hair, if present, was removed to allow for adhesion.
Detail Level: Detailed
No Repair - Repaired With Adjacent Surgical Defect Text (Leave Blank If You Do Not Want): After the excision the defect was repaired concurrently with another surgical defect which was in close approximation.
Complex Repair And Z Plasty Text: The defect edges were debeveled with a #15 scalpel blade.  The primary defect was closed partially with a complex linear closure.  Given the location of the remaining defect, shape of the defect and the proximity to free margins a Z plasty was deemed most appropriate for complete closure of the defect.  Using a sterile surgical marker, an appropriate advancement flap was drawn incorporating the defect and placing the expected incisions within the relaxed skin tension lines where possible.    The area thus outlined was incised deep to adipose tissue with a #15 scalpel blade.  The skin margins were undermined to an appropriate distance in all directions utilizing iris scissors.
Advancement-Rotation Flap Text: The defect edges were debeveled with a #15 scalpel blade.  Given the location of the defect, shape of the defect and the proximity to free margins an advancement-rotation flap was deemed most appropriate.  Using a sterile surgical marker, an appropriate flap was drawn incorporating the defect and placing the expected incisions within the relaxed skin tension lines where possible. The area thus outlined was incised deep to adipose tissue with a #15 scalpel blade.  The skin margins were undermined to an appropriate distance in all directions utilizing iris scissors.
Cheek-To-Nose Interpolation Flap Text: A decision was made to reconstruct the defect utilizing an interpolation axial flap and a staged reconstruction.  A telfa template was made of the defect.  This telfa template was then used to outline the Cheek-To-Nose Interpolation flap.  The donor area for the pedicle flap was then injected with anesthesia.  The flap was excised through the skin and subcutaneous tissue down to the layer of the underlying musculature.  The interpolation flap was carefully excised within this deep plane to maintain its blood supply.  The edges of the donor site were undermined.   The donor site was closed in a primary fashion.  The pedicle was then rotated into position and sutured.  Once the tube was sutured into place, adequate blood supply was confirmed with blanching and refill.  The pedicle was then wrapped with xeroform gauze and dressed appropriately with a telfa and gauze bandage to ensure continued blood supply and protect the attached pedicle.
Graft Donor Site Bandage (Optional-Leave Blank If You Don't Want In Note): Steri-strips and a pressure bandage were applied to the donor site.
Composite Graft Text: The defect edges were debeveled with a #15 scalpel blade.  Given the location of the defect, shape of the defect, the proximity to free margins and the fact the defect was full thickness a composite graft was deemed most appropriate.  The defect was outline and then transferred to the donor site.  A full thickness graft was then excised from the donor site. The graft was then placed in the primary defect, oriented appropriately and then sutured into place.  The secondary defect was then repaired using a primary closure.
O-T Plasty Text: The defect edges were debeveled with a #15 scalpel blade.  Given the location of the defect, shape of the defect and the proximity to free margins an O-T plasty was deemed most appropriate.  Using a sterile surgical marker, an appropriate O-T plasty was drawn incorporating the defect and placing the expected incisions within the relaxed skin tension lines where possible.    The area thus outlined was incised deep to adipose tissue with a #15 scalpel blade.  The skin margins were undermined to an appropriate distance in all directions utilizing iris scissors.
Number Of Hemigard Strips Per Side: 1
Positioning (Leave Blank If You Do Not Want): The patient was placed in a comfortable position exposing the surgical site.
Complex Repair And Double Advancement Flap Text: The defect edges were debeveled with a #15 scalpel blade.  The primary defect was closed partially with a complex linear closure.  Given the location of the remaining defect, shape of the defect and the proximity to free margins a double advancement flap was deemed most appropriate for complete closure of the defect.  Using a sterile surgical marker, an appropriate advancement flap was drawn incorporating the defect and placing the expected incisions within the relaxed skin tension lines where possible.    The area thus outlined was incised deep to adipose tissue with a #15 scalpel blade.  The skin margins were undermined to an appropriate distance in all directions utilizing iris scissors.
Complex Repair And Split-Thickness Skin Graft Text: The defect edges were debeveled with a #15 scalpel blade.  The primary defect was closed partially with a complex linear closure.  Given the location of the defect, shape of the defect and the proximity to free margins a split thickness skin graft was deemed most appropriate to repair the remaining defect.  The graft was trimmed to fit the size of the remaining defect.  The graft was then placed in the primary defect, oriented appropriately, and sutured into place.
Keystone Flap Text: The defect edges were debeveled with a #15 scalpel blade.  Given the location of the defect, shape of the defect a keystone flap was deemed most appropriate.  Using a sterile surgical marker, an appropriate keystone flap was drawn incorporating the defect, outlining the appropriate donor tissue and placing the expected incisions within the relaxed skin tension lines where possible. The area thus outlined was incised deep to adipose tissue with a #15 scalpel blade.  The skin margins were undermined to an appropriate distance in all directions around the primary defect and laterally outward around the flap utilizing iris scissors.
Modified Advancement Flap Text: The defect edges were debeveled with a #15 scalpel blade.  Given the location of the defect, shape of the defect and the proximity to free margins a modified advancement flap was deemed most appropriate.  Using a sterile surgical marker, an appropriate advancement flap was drawn incorporating the defect and placing the expected incisions within the relaxed skin tension lines where possible.    The area thus outlined was incised deep to adipose tissue with a #15 scalpel blade.  The skin margins were undermined to an appropriate distance in all directions utilizing iris scissors.
Complex Repair And Bilobe Flap Text: The defect edges were debeveled with a #15 scalpel blade.  The primary defect was closed partially with a complex linear closure.  Given the location of the remaining defect, shape of the defect and the proximity to free margins a bilobe flap was deemed most appropriate for complete closure of the defect.  Using a sterile surgical marker, an appropriate advancement flap was drawn incorporating the defect and placing the expected incisions within the relaxed skin tension lines where possible.    The area thus outlined was incised deep to adipose tissue with a #15 scalpel blade.  The skin margins were undermined to an appropriate distance in all directions utilizing iris scissors.
Muscle Hinge Flap Text: The defect edges were debeveled with a #15 scalpel blade.  Given the size, depth and location of the defect and the proximity to free margins a muscle hinge flap was deemed most appropriate.  Using a sterile surgical marker, an appropriate hinge flap was drawn incorporating the defect. The area thus outlined was incised with a #15 scalpel blade.  The skin margins were undermined to an appropriate distance in all directions utilizing iris scissors.
Dorsal Nasal Flap Text: The defect edges were debeveled with a #15 scalpel blade.  Given the location of the defect and the proximity to free margins a dorsal nasal flap was deemed most appropriate.  Using a sterile surgical marker, an appropriate dorsal nasal flap was drawn around the defect.    The area thus outlined was incised deep to adipose tissue with a #15 scalpel blade.  The skin margins were undermined to an appropriate distance in all directions utilizing iris scissors.
Medical Necessity Information: It is in your best interest to select a reason for this procedure from the list below. All of these items fulfill various CMS LCD requirements except lesion extends to a margin.
Excision Method: Elliptical
Helical Rim Advancement Flap Text: The defect edges were debeveled with a #15 blade scalpel.  Given the location of the defect and the proximity to free margins (helical rim) a double helical rim advancement flap was deemed most appropriate.  Using a sterile surgical marker, the appropriate advancement flaps were drawn incorporating the defect and placing the expected incisions between the helical rim and antihelix where possible.  The area thus outlined was incised through and through with a #15 scalpel blade.  With a skin hook and iris scissors, the flaps were gently and sharply undermined and freed up.
Vermilion Border Text: The closure involved the vermilion border.
Dermal Autograft Text: The defect edges were debeveled with a #15 scalpel blade.  Given the location of the defect, shape of the defect and the proximity to free margins a dermal autograft was deemed most appropriate.  Using a sterile surgical marker, the primary defect shape was transferred to the donor site. The area thus outlined was incised deep to adipose tissue with a #15 scalpel blade.  The harvested graft was then trimmed of adipose and epidermal tissue until only dermis was left.  The skin graft was then placed in the primary defect and oriented appropriately.
Ftsg Text: The defect edges were debeveled with a #15 scalpel blade.  Given the location of the defect, shape of the defect and the proximity to free margins a full thickness skin graft was deemed most appropriate.  Using a sterile surgical marker, the primary defect shape was transferred to the donor site. The area thus outlined was incised deep to adipose tissue with a #15 scalpel blade.  The harvested graft was then trimmed of adipose tissue until only dermis and epidermis was left.  The skin margins of the secondary defect were undermined to an appropriate distance in all directions utilizing iris scissors.  The secondary defect was closed with interrupted buried subcutaneous sutures.  The skin edges were then re-apposed with running  sutures.  The skin graft was then placed in the primary defect and oriented appropriately.
Intermediate / Complex Repair - Final Wound Length In Cm: 4.5

## 2022-03-18 PROBLEM — E21.0 PRIMARY HYPERPARATHYROIDISM (HCC): Status: ACTIVE | Noted: 2019-12-13

## 2022-03-18 PROBLEM — M16.12 OSTEOARTHRITIS OF LEFT HIP: Status: ACTIVE | Noted: 2021-03-23

## 2022-03-18 PROBLEM — I69.30 HISTORY OF STROKE WITH RESIDUAL DEFICIT: Status: ACTIVE | Noted: 2018-11-07

## 2022-03-18 PROBLEM — Z79.01 CHRONIC ANTICOAGULATION: Status: ACTIVE | Noted: 2021-05-10

## 2022-03-19 PROBLEM — G47.00 INSOMNIA: Status: ACTIVE | Noted: 2017-10-30

## 2022-03-19 PROBLEM — E55.9 VITAMIN D DEFICIENCY: Status: ACTIVE | Noted: 2020-03-31

## 2022-03-19 PROBLEM — Z87.891 HISTORY OF TOBACCO USE: Status: ACTIVE | Noted: 2021-05-10

## 2022-03-19 PROBLEM — R97.20 ELEVATED PSA: Status: ACTIVE | Noted: 2020-11-10

## 2022-03-19 PROBLEM — E78.00 HYPERCHOLESTEREMIA: Status: ACTIVE | Noted: 2017-10-30

## 2022-03-19 PROBLEM — Z79.899 ENCOUNTER FOR LONG-TERM CURRENT USE OF MEDICATION: Status: ACTIVE | Noted: 2019-10-15

## 2022-03-19 PROBLEM — R06.83 SNORING: Status: ACTIVE | Noted: 2020-08-20

## 2022-03-19 PROBLEM — J93.9 PNEUMOTHORAX ON RIGHT: Status: ACTIVE | Noted: 2021-05-10

## 2022-03-19 PROBLEM — I10 HYPERTENSION: Status: ACTIVE | Noted: 2018-10-31

## 2022-03-19 PROBLEM — M19.90 OSTEOARTHRITIS: Status: ACTIVE | Noted: 2020-07-07

## 2022-03-20 PROBLEM — R35.1 NOCTURIA: Status: ACTIVE | Noted: 2020-11-05

## 2022-03-20 PROBLEM — M16.11 ARTHRITIS OF RIGHT HIP: Status: ACTIVE | Noted: 2020-09-03

## 2022-03-20 PROBLEM — M85.80 OSTEOPENIA: Status: ACTIVE | Noted: 2020-03-31

## 2022-05-11 PROBLEM — M16.11 ARTHRITIS OF RIGHT HIP: Chronic | Status: RESOLVED | Noted: 2020-09-03 | Resolved: 2022-05-11

## 2022-05-11 PROBLEM — J93.9 PNEUMOTHORAX ON RIGHT: Status: RESOLVED | Noted: 2021-05-10 | Resolved: 2022-05-11

## 2022-05-11 PROBLEM — M16.11 ARTHRITIS OF RIGHT HIP: Status: RESOLVED | Noted: 2020-09-03 | Resolved: 2022-05-11

## 2022-05-11 PROBLEM — M16.12 OSTEOARTHRITIS OF LEFT HIP: Status: RESOLVED | Noted: 2021-03-23 | Resolved: 2022-05-11

## 2022-05-11 PROBLEM — M16.12 OSTEOARTHRITIS OF LEFT HIP: Chronic | Status: RESOLVED | Noted: 2021-03-23 | Resolved: 2022-05-11

## 2022-06-09 DIAGNOSIS — I69.30 HISTORY OF STROKE WITH RESIDUAL DEFICIT: Primary | ICD-10-CM

## 2022-06-09 NOTE — PROGRESS NOTES
This note will not be viewable in [address] ALERT REMOTE CHECK     Preliminary Impression: Requires physician review due to loop at EOS, patient prefers to not extract. Following MD: Dr. Richard Spine had an alert noting loop at EOS. Hx of AF on AC. Patient aware and prefers to leave the monitor in at this time. The device report was generated from the remote website, and the results were forwarded to the ordering provider for review. Please see Interrogation report for the final results.      Sawyer Cheema RN  6/9/2022  9:31 AM

## 2022-07-29 RX ORDER — TADALAFIL 20 MG/1
TABLET ORAL
Qty: 18 TABLET | Refills: 5 | Status: SHIPPED | OUTPATIENT
Start: 2022-07-29

## 2022-08-17 ENCOUNTER — PATIENT MESSAGE (OUTPATIENT)
Dept: INTERNAL MEDICINE CLINIC | Facility: CLINIC | Age: 72
End: 2022-08-17

## 2022-08-17 DIAGNOSIS — M48.061 SPINAL STENOSIS, LUMBAR REGION WITHOUT NEUROGENIC CLAUDICATION: Primary | ICD-10-CM

## 2022-11-07 ENCOUNTER — OFFICE VISIT (OUTPATIENT)
Dept: ENDOCRINOLOGY | Age: 72
End: 2022-11-07
Payer: MEDICARE

## 2022-11-07 VITALS
BODY MASS INDEX: 23.65 KG/M2 | HEIGHT: 72 IN | TEMPERATURE: 97.9 F | DIASTOLIC BLOOD PRESSURE: 80 MMHG | SYSTOLIC BLOOD PRESSURE: 138 MMHG | RESPIRATION RATE: 16 BRPM | OXYGEN SATURATION: 97 % | WEIGHT: 174.6 LBS | HEART RATE: 66 BPM

## 2022-11-07 DIAGNOSIS — E21.0 PRIMARY HYPERPARATHYROIDISM (HCC): Primary | ICD-10-CM

## 2022-11-07 DIAGNOSIS — M85.80 OSTEOPENIA, UNSPECIFIED LOCATION: ICD-10-CM

## 2022-11-07 DIAGNOSIS — E55.9 VITAMIN D DEFICIENCY: ICD-10-CM

## 2022-11-07 DIAGNOSIS — E21.0 PRIMARY HYPERPARATHYROIDISM (HCC): ICD-10-CM

## 2022-11-07 PROCEDURE — G8420 CALC BMI NORM PARAMETERS: HCPCS | Performed by: INTERNAL MEDICINE

## 2022-11-07 PROCEDURE — 3017F COLORECTAL CA SCREEN DOC REV: CPT | Performed by: INTERNAL MEDICINE

## 2022-11-07 PROCEDURE — 1123F ACP DISCUSS/DSCN MKR DOCD: CPT | Performed by: INTERNAL MEDICINE

## 2022-11-07 PROCEDURE — G8484 FLU IMMUNIZE NO ADMIN: HCPCS | Performed by: INTERNAL MEDICINE

## 2022-11-07 PROCEDURE — 99214 OFFICE O/P EST MOD 30 MIN: CPT | Performed by: INTERNAL MEDICINE

## 2022-11-07 PROCEDURE — 3078F DIAST BP <80 MM HG: CPT | Performed by: INTERNAL MEDICINE

## 2022-11-07 PROCEDURE — G8427 DOCREV CUR MEDS BY ELIG CLIN: HCPCS | Performed by: INTERNAL MEDICINE

## 2022-11-07 PROCEDURE — 1036F TOBACCO NON-USER: CPT | Performed by: INTERNAL MEDICINE

## 2022-11-07 PROCEDURE — 3074F SYST BP LT 130 MM HG: CPT | Performed by: INTERNAL MEDICINE

## 2022-11-07 RX ORDER — ERGOCALCIFEROL (VITAMIN D2) 1250 MCG
CAPSULE ORAL
Qty: 6 CAPSULE | Refills: 3 | Status: SHIPPED | OUTPATIENT
Start: 2022-11-07

## 2022-11-07 ASSESSMENT — ENCOUNTER SYMPTOMS: SHORTNESS OF BREATH: 0

## 2022-11-07 NOTE — PROGRESS NOTES
Deep Santos MD, [facility]  [address], [address] [facility], [address] [place] [address]  Phone [phone]  Facsimile [phone]          Prachi Leahy is a 81 y.o. male seen 11/7/2022 for follow-up of primary hyperparathyroidism        ASSESSMENT AND PLAN:    1. Primary hyperparathyroidism ([address]  He has had an intermittently, mildly elevated serum calcium level in the setting of a nonsuppressed parathyroid hormone level, consistent with primary hyperparathyroidism. His 24-hour urine calcium was high normal.  Bone density revealed osteopenia. Although he does not have any strict indications for parathyroidectomy, it would be reasonable to consider parathyroidectomy based on the aforementioned soft criteria. He would obviously prefer to avoid surgery if possible, however, and I will plan on monitoring his serum calcium level closely. Assuming his calcium level remains stable, I will plan on seeing him back in 1 year. 2. Vitamin D deficiency  He has been out of his ergocalciferol since approximately 3/2022. I will assess his vitamin D level today and I will have him resume his ergocalciferol.    - ergocalciferol (ERGOCALCIFEROL) 1,250 mcg (50,000 unit) capsule; Day 1 and 15 of each month  Dispense: 6 Cap; Refill: 3     3. Osteopenia, unspecified location  Bone density 2/2020 revealed osteopenia. His overall fracture risk is low and I do not think that antiresorptive therapy is needed at this time. I will plan on repeating a bone density at this time. Worsening bone loss would be a reasonable indication for parathyroidectomy. Follow-up and Dispositions    Return in about 1 year (around 11/7/2023). HISTORY OF PRESENT ILLNESS:    HYPERCALCEMIA     Presentation/Diagnosis: Incidentally noted on routine lab screen. Risk Factors:  Denies the use of calcium. He usually takes vitamin D2 50,000 units twice a month but he ran out 3/2022.   He stopped chlorthalidone. Denies the use of lithium. No family history of hypercalcemia, hyperparathyroidism, multiple endocrine neoplasia. Symptoms: No history of kidney stones. Denies hematuria, flank pain, polyuria, polydipsia. Denies fatigue, constipation, abdominal pain, depression. He reports some arthralgias but denies deep bone pain. No history of fragility fractures. Imaging:   DXA 2/12/2020: L1-L2 BMD 1.282, T score 0.6; left femoral neck BMD 1.026, T score -0.3; right femoral neck BMD 1.029, T score -0.3; left total hip BMD 1.019, T score -0.6; right total hip BMD 0.945, T score -1.1; left one third radius BMD 0.921, T score -0.7. Labs:  10/15/2019: Calcium 10.3, TSH 2.170.  11/7/2019: Calcium 10.4, PTH 44.  12/6/2019: Calcium 10.3, creatinine 0.77 (GFR 94). 12/30/2019: Calcium 10.0, ionized calcium 5.3, phosphorus 2.9, sodium 130, potassium 3.9, creatinine 0.69 (GFR 98), PTH 32, 25-OH vitamin D 12.4.  2/17/2020: Calcium 10.8, ionized calcium 5.6, sodium 138, potassium 4.6, creatinine 0.73 (GFR 96), PTH 39, 24-hour urine calcium 342, 24-hour urine creatinine 1210.  6/18/2020: Calcium 10.2 (8.3-10.0), ionized calcium 5.24 (4.6-5.4), sodium 136, potassium 5.0, creatinine 0.6 (GFR >60), PTH 53.9, 25-OH vitamin D 35.0.  8/17/2020: Calcium 9.9.  12/10/2020: Calcium 10.3, creatinine 0.82 (GFR >60). 12/29/2020: Calcium 10.6, ionized calcium 5.6, creatinine 0.85 (GFR 90), PTH 42, 25-OH vitamin D 37.1.  5/10/2021: Calcium 10.3, albumin 4.4, creatinine 0.71 (GFR 96). 10/11/2021: Ionized calcium 5.24 (4.60-5.40), PTH 39.4, 25-OH vitamin D 30.7.  11/10/2021: Calcium 10.2, albumin 4.6, creatinine 0.70 (GFR 96), 25-OH vitamin D 41.7, TSH 1.640.  5/11/2022: Calcium 10.4, albumin 4.4, creatinine 0.74 (GFR 97). Review of Systems   Constitutional:         Weight increased 10 pounds the past 6 months. Respiratory:  Negative for shortness of breath. Cardiovascular:  Negative for chest pain. Vital Signs:  /80 (Site: Left Upper Arm, Position: Sitting, Cuff Size: Small Adult)   Pulse 66   Temp 97.9 °F (36.6 °C) (Temporal)   Resp 16   Ht 6' (1.829 m)   Wt 174 lb 9.6 oz (79.2 kg)   SpO2 97%   BMI 23.68 kg/m²     Wt Readings from Last 3 Encounters:   11/07/22 174 lb 9.6 oz (79.2 kg)   05/11/22 164 lb (74.4 kg)   02/07/22 174 lb (78.9 kg)       Physical Exam  Constitutional:       General: He is not in acute distress. Neck:      Thyroid: No thyroid mass or thyromegaly. Cardiovascular:      Rate and Rhythm: Normal rate and regular rhythm. Lymphadenopathy:      Cervical: No cervical adenopathy. Neurological:      Motor: No tremor.          Orders Placed This Encounter   Procedures    DEXA BONE DENSITY AXIAL SKELETON     Standing Status:   Future     Standing Expiration Date:   11/7/2023     Order Specific Question:   Reason for exam:     Answer:   Please include forearm    Comprehensive Metabolic Panel     Standing Status:   Future     Standing Expiration Date:   11/7/2023    Calcium, Ionized     Standing Status:   Future     Standing Expiration Date:   11/7/2023    PTH, Intact     Standing Status:   Future     Standing Expiration Date:   11/7/2023    Vitamin D 25 Hydroxy     Standing Status:   Future     Standing Expiration Date:   11/7/2023         Current Outpatient Medications   Medication Sig Dispense Refill    tadalafil (CIALIS) 20 MG tablet TAKE 1 TABLET AS NEEDED FOR ERECTILE DYSFUNCTION 18 tablet 5    losartan (COZAAR) 50 MG tablet Take 50 mg by mouth Daily with supper      melatonin 3 MG TABS tablet Take 3 mg by mouth      metoprolol succinate (TOPROL XL) 25 MG extended release tablet Take 25 mg by mouth daily      rivaroxaban (XARELTO) 20 MG TABS tablet TAKE 1 TABLET DAILY WITH DINNER      carbamide peroxide (DEBROX) 6.5 % otic solution Place 5 drops in ear(s) 2 times daily (Patient not taking: Reported on 11/7/2022)      ergocalciferol (ERGOCALCIFEROL) 1.25 MG (76246 UT) capsule Day 1 and 15 of each month (Patient not taking: Reported on 11/7/2022)       No current facility-administered medications for this visit.

## 2022-11-08 LAB
25(OH)D3 SERPL-MCNC: 32.3 NG/ML (ref 30–100)
ALBUMIN SERPL-MCNC: 4.2 G/DL (ref 3.2–4.6)
ALBUMIN/GLOB SERPL: 1.6 {RATIO} (ref 0.4–1.6)
ALP SERPL-CCNC: 71 U/L (ref 50–136)
ALT SERPL-CCNC: 25 U/L (ref 12–65)
ANION GAP SERPL CALC-SCNC: 4 MMOL/L (ref 2–11)
AST SERPL-CCNC: 20 U/L (ref 15–37)
BILIRUB SERPL-MCNC: 0.4 MG/DL (ref 0.2–1.1)
BUN SERPL-MCNC: 12 MG/DL (ref 8–23)
CALCIUM SERPL-MCNC: 10.3 MG/DL (ref 8.3–10.4)
CALCIUM SERPL-MCNC: 8.9 MG/DL (ref 8.3–10.4)
CHLORIDE SERPL-SCNC: 107 MMOL/L (ref 101–110)
CO2 SERPL-SCNC: 28 MMOL/L (ref 21–32)
CREAT SERPL-MCNC: 0.8 MG/DL (ref 0.8–1.5)
GLOBULIN SER CALC-MCNC: 2.7 G/DL (ref 2.8–4.5)
GLUCOSE SERPL-MCNC: 82 MG/DL (ref 65–100)
POTASSIUM SERPL-SCNC: 5.2 MMOL/L (ref 3.5–5.1)
PROT SERPL-MCNC: 6.9 G/DL (ref 6.3–8.2)
PTH-INTACT SERPL-MCNC: 85.2 PG/ML (ref 18.5–88)
SODIUM SERPL-SCNC: 139 MMOL/L (ref 133–143)

## 2022-11-09 LAB — CA-I SERPL ISE-MCNC: 5.5 MG/DL (ref 4.5–5.6)

## 2022-11-17 ENCOUNTER — HOSPITAL ENCOUNTER (OUTPATIENT)
Dept: MAMMOGRAPHY | Age: 72
Discharge: HOME | End: 2022-11-20
Payer: MEDICARE

## 2022-11-17 DIAGNOSIS — E21.0 PRIMARY HYPERPARATHYROIDISM (HCC): ICD-10-CM

## 2022-11-17 PROCEDURE — 77080 DXA BONE DENSITY AXIAL: CPT

## 2022-11-17 NOTE — RESULT ENCOUNTER NOTE
You have lost some bone mass in comparison with two years ago. You have worsening osteopenia but not osteoporosis. Continue at least 600 mg of calcium plus 800 international units of vitamin D twice daily as well as weight bearing exercise. Repeat in 2 years. Hope you're feeling well. Thanks.   Tenzin Steele

## 2022-11-21 ENCOUNTER — OFFICE VISIT (OUTPATIENT)
Dept: INTERNAL MEDICINE CLINIC | Facility: CLINIC | Age: 72
End: 2022-11-21
Payer: MEDICARE

## 2022-11-21 VITALS
SYSTOLIC BLOOD PRESSURE: 152 MMHG | HEART RATE: 71 BPM | HEIGHT: 72 IN | OXYGEN SATURATION: 100 % | WEIGHT: 176.8 LBS | DIASTOLIC BLOOD PRESSURE: 87 MMHG | BODY MASS INDEX: 23.95 KG/M2 | TEMPERATURE: 98.6 F

## 2022-11-21 DIAGNOSIS — R35.1 NOCTURIA: ICD-10-CM

## 2022-11-21 DIAGNOSIS — E55.9 VITAMIN D DEFICIENCY: ICD-10-CM

## 2022-11-21 DIAGNOSIS — R97.20 ELEVATED PSA: ICD-10-CM

## 2022-11-21 DIAGNOSIS — I10 PRIMARY HYPERTENSION: Primary | ICD-10-CM

## 2022-11-21 DIAGNOSIS — E78.00 HYPERCHOLESTEREMIA: ICD-10-CM

## 2022-11-21 DIAGNOSIS — M48.061 SPINAL STENOSIS, LUMBAR REGION WITHOUT NEUROGENIC CLAUDICATION: ICD-10-CM

## 2022-11-21 DIAGNOSIS — Z00.00 MEDICARE ANNUAL WELLNESS VISIT, SUBSEQUENT: ICD-10-CM

## 2022-11-21 DIAGNOSIS — E21.0 PRIMARY HYPERPARATHYROIDISM (HCC): ICD-10-CM

## 2022-11-21 DIAGNOSIS — I69.30 HISTORY OF STROKE WITH RESIDUAL DEFICIT: ICD-10-CM

## 2022-11-21 LAB
CHOLEST SERPL-MCNC: 167 MG/DL
HDLC SERPL-MCNC: 91 MG/DL (ref 40–60)
HDLC SERPL: 1.8 {RATIO}
LDLC SERPL CALC-MCNC: 54 MG/DL
PSA SERPL-MCNC: 5.1 NG/ML
TRIGL SERPL-MCNC: 110 MG/DL (ref 35–150)
TSH W FREE THYROID IF ABNORMAL: 1.75 UIU/ML (ref 0.36–3.74)
VLDLC SERPL CALC-MCNC: 22 MG/DL (ref 6–23)

## 2022-11-21 PROCEDURE — G8484 FLU IMMUNIZE NO ADMIN: HCPCS | Performed by: INTERNAL MEDICINE

## 2022-11-21 PROCEDURE — 3078F DIAST BP <80 MM HG: CPT | Performed by: INTERNAL MEDICINE

## 2022-11-21 PROCEDURE — 1123F ACP DISCUSS/DSCN MKR DOCD: CPT | Performed by: INTERNAL MEDICINE

## 2022-11-21 PROCEDURE — 1036F TOBACCO NON-USER: CPT | Performed by: INTERNAL MEDICINE

## 2022-11-21 PROCEDURE — 3017F COLORECTAL CA SCREEN DOC REV: CPT | Performed by: INTERNAL MEDICINE

## 2022-11-21 PROCEDURE — G8427 DOCREV CUR MEDS BY ELIG CLIN: HCPCS | Performed by: INTERNAL MEDICINE

## 2022-11-21 PROCEDURE — 3074F SYST BP LT 130 MM HG: CPT | Performed by: INTERNAL MEDICINE

## 2022-11-21 PROCEDURE — 99213 OFFICE O/P EST LOW 20 MIN: CPT | Performed by: INTERNAL MEDICINE

## 2022-11-21 PROCEDURE — G0439 PPPS, SUBSEQ VISIT: HCPCS | Performed by: INTERNAL MEDICINE

## 2022-11-21 PROCEDURE — G8420 CALC BMI NORM PARAMETERS: HCPCS | Performed by: INTERNAL MEDICINE

## 2022-11-21 SDOH — ECONOMIC STABILITY: FOOD INSECURITY: WITHIN THE PAST 12 MONTHS, THE FOOD YOU BOUGHT JUST DIDN'T LAST AND YOU DIDN'T HAVE MONEY TO GET MORE.: NEVER TRUE

## 2022-11-21 SDOH — ECONOMIC STABILITY: FOOD INSECURITY: WITHIN THE PAST 12 MONTHS, YOU WORRIED THAT YOUR FOOD WOULD RUN OUT BEFORE YOU GOT MONEY TO BUY MORE.: NEVER TRUE

## 2022-11-21 ASSESSMENT — PATIENT HEALTH QUESTIONNAIRE - PHQ9
SUM OF ALL RESPONSES TO PHQ QUESTIONS 1-9: 0
SUM OF ALL RESPONSES TO PHQ9 QUESTIONS 1 & 2: 0
1. LITTLE INTEREST OR PLEASURE IN DOING THINGS: 0
SUM OF ALL RESPONSES TO PHQ QUESTIONS 1-9: 0
SUM OF ALL RESPONSES TO PHQ QUESTIONS 1-9: 0
2. FEELING DOWN, DEPRESSED OR HOPELESS: 0
SUM OF ALL RESPONSES TO PHQ QUESTIONS 1-9: 0

## 2022-11-21 ASSESSMENT — ENCOUNTER SYMPTOMS
COUGH: 0
VOMITING: 0
CONSTIPATION: 0
WHEEZING: 0
NAUSEA: 0
DIARRHEA: 0
BLOOD IN STOOL: 0
SHORTNESS OF BREATH: 0

## 2022-11-21 ASSESSMENT — SOCIAL DETERMINANTS OF HEALTH (SDOH): HOW HARD IS IT FOR YOU TO PAY FOR THE VERY BASICS LIKE FOOD, HOUSING, MEDICAL CARE, AND HEATING?: NOT HARD AT ALL

## 2022-11-21 NOTE — PATIENT INSTRUCTIONS
Personalized Preventive Plan for Silvino Donald - 11/21/2022  Medicare offers a range of preventive health benefits. Some of the tests and screenings are paid in full while other may be subject to a deductible, co-insurance, and/or copay. Some of these benefits include a comprehensive review of your medical history including lifestyle, illnesses that may run in your family, and various assessments and screenings as appropriate. After reviewing your medical record and screening and assessments performed today your provider may have ordered immunizations, labs, imaging, and/or referrals for you. A list of these orders (if applicable) as well as your Preventive Care list are included within your After Visit Summary for your review. Other Preventive Recommendations:    A preventive eye exam performed by an eye specialist is recommended every 1-2 years to screen for glaucoma; cataracts, macular degeneration, and other eye disorders. A preventive dental visit is recommended every 6 months. Try to get at least 150 minutes of exercise per week or 10,000 steps per day on a pedometer . Order or download the FREE \"Exercise & Physical Activity: Your Everyday Guide\" from [employer]. Call 7-623.783.8626 or search [employer] online. You need 5958-6191 mg of calcium and 9198-2221 IU of vitamin D per day. It is possible to meet your calcium requirement with diet alone, but a vitamin D supplement is usually necessary to meet this goal.  When exposed to the sun, use a sunscreen that protects against both UVA and UVB radiation with an SPF of 30 or greater. Reapply every 2 to 3 hours or after sweating, drying off with a towel, or swimming. Always wear a seat belt when traveling in a car. Always wear a helmet when riding a bicycle or motorcycle.

## 2022-11-21 NOTE — PROGRESS NOTES
2022 9:41 AM  Location:[facility] [address] [facility]  [place]  Patient #:  [id number]  YOB: 1950            History of Present Illness     Chief Complaint   Patient presents with    Follow-up     Pt presents to the office today for their follow-up for spinal stenosis. Mr. Emily Roman is a 81 y.o. male  who presents for the above. Notes that he has reaped a great benefit from PT. Feels more stable and pain is under control as far as his back is concerned. Has printed exercises that he is doing regularly. Notes that his BP at home is always less than 140/90. No Known Allergies  Past Medical History:   Diagnosis Date    Actinic keratosis     Adverse effect of anesthesia     liver enzymes increased for 1 week following general anesthesia for shoulder sx    Anxiety     Atrial flutter (HCC)     s/p ablation 21    CVA (cerebral vascular accident) ([facility] [address] 10/23/2018    numbness remains in R hand    Erectile dysfunction     Former smoker     Quit 2018--- 1 ppd x 40 yrs    Hypertension     managed with med    Osteoarthritis     feet--- surg for correction    Pain in joint involving pelvic region and thigh     Rosacea     Wide-complex tachycardia 2021    pt has ILR in place and is followed by Dr Michael Wu ([facility])      Social History     Socioeconomic History    Marital status:      Spouse name: None    Number of children: None    Years of education: None    Highest education level: None   Tobacco Use    Smoking status: Former     Packs/day: 1.00     Years: 10.00     Pack years: 10.00     Types: Cigarettes     Quit date: 2018     Years since quittin.7    Smokeless tobacco: Never   Substance and Sexual Activity    Alcohol use:  Yes     Alcohol/week: 2.0 standard drinks    Drug use: No   Social History Narrative    Lives with wife     Social Determinants of Health     Financial Resource Strain: Low Risk     Difficulty of Paying Living Expenses: Not hard at all   Food Insecurity: No Food Insecurity    Worried About [facility] in the Last Year: Never true    Ran Out of Food in the Last Year: Never true     Past Surgical History:   Procedure Laterality Date    ABLATION OF DYSRHYTHMIC FOCUS  01/07/2021    pt has loop recorder    ANESTH,SURGERY OF SHOULDER      COLONOSCOPY      ORTHOPEDIC SURGERY Right 03/09/2018    RIGHT FOOT 1ST MTP CHEILECTOMY     ORTHOPEDIC SURGERY Left 03/2019    elbow surg    ROTATOR CUFF REPAIR Left 2013    SHOULDER ARTHROSCOPY Right 2011    bicep    TOTAL HIP ARTHROPLASTY Right 09/03/2020     Current Outpatient Medications   Medication Sig Dispense Refill    ergocalciferol (ERGOCALCIFEROL) 1.25 MG (67931 UT) capsule Day 1 and 15 of each month 6 capsule 3    tadalafil (CIALIS) 20 MG tablet TAKE 1 TABLET AS NEEDED FOR ERECTILE DYSFUNCTION 18 tablet 5    losartan (COZAAR) 50 MG tablet Take 50 mg by mouth Daily with supper      melatonin 3 MG TABS tablet Take 3 mg by mouth      metoprolol succinate (TOPROL XL) 25 MG extended release tablet Take 25 mg by mouth daily      rivaroxaban (XARELTO) 20 MG TABS tablet TAKE 1 TABLET DAILY WITH DINNER       No current facility-administered medications for this visit.      Health Maintenance   Topic Date Due    COVID-19 Vaccine (1) Never done    DTaP/Tdap/Td vaccine (1 - Tdap) Never done    Shingles vaccine (2 of 3) 10/15/2014    Pneumococcal 65+ years Vaccine (2 - PPSV23 if available, else PCV20) 11/04/2020    Flu vaccine (1) 08/01/2022    Depression Screen  05/11/2023    Prostate Specific Antigen (PSA) Screening or Monitoring  05/11/2023    Annual Wellness Visit (AWV)  11/22/2023    Colorectal Cancer Screen  06/30/2026    Lipids  11/10/2026    AAA screen  Completed    Hepatitis C screen  Completed    Hepatitis A vaccine  Aged Out    Hib vaccine  Aged Out    Meningococcal (ACWY) vaccine  Aged Out     Family History   Problem Relation Age of Onset    Hypertension Brother Heart Disease Brother     Heart Attack Brother         before age 61    Heart Disease Father     Heart Disease Mother              Review of Systems  Review of Systems   Constitutional:  Negative for chills and fever. Respiratory:  Negative for cough, shortness of breath and wheezing. Chest tightness: psa. Cardiovascular:  Negative for chest pain, palpitations and leg swelling. Gastrointestinal:  Negative for blood in stool, constipation, diarrhea, nausea and vomiting. Genitourinary:  Positive for frequency (nocturia on occasion). Negative for difficulty urinating, dysuria and hematuria. Musculoskeletal:  Negative for arthralgias (all under control). Neurological:  Negative for weakness and numbness. Psychiatric/Behavioral:  Negative for dysphoric mood and sleep disturbance. The patient is not nervous/anxious. BP (!) 152/87 (Site: Left Upper Arm, Position: Sitting, Cuff Size: Medium Adult) Comment: recheck  Pulse 71   Temp 98.6 °F (37 °C) (Temporal)   Ht 6' (1.829 m)   Wt 176 lb 12.8 oz (80.2 kg)   SpO2 100%   BMI 23.98 kg/m²       Physical Exam    Physical Exam  Constitutional:       Appearance: Normal appearance. HENT:      Head: Normocephalic and atraumatic. Neck:      Vascular: No carotid bruit. Cardiovascular:      Rate and Rhythm: Normal rate and regular rhythm. Pulmonary:      Effort: Pulmonary effort is normal.      Breath sounds: Normal breath sounds. Abdominal:      General: Abdomen is flat. There is no distension. Palpations: Abdomen is soft. Tenderness: There is no abdominal tenderness. Musculoskeletal:      Cervical back: No tenderness. Thoracic back: Deformity (palpable stepoff) present. No tenderness. Lumbar back: No tenderness. Right lower leg: No edema. Left lower leg: No edema. Comments: Heberden's and Ambrose's nodes noted   Neurological:      General: No focal deficit present.       Mental Status: He is alert and oriented to person, place, and time. Gait: Gait (much improved) normal.   Psychiatric:         Mood and Affect: Mood normal.         Behavior: Behavior normal.         Assessment & Plan    Current Outpatient Medications   Medication Sig Dispense Refill    ergocalciferol (ERGOCALCIFEROL) 1.25 MG (57485 UT) capsule Day 1 and 15 of each month 6 capsule 3    tadalafil (CIALIS) 20 MG tablet TAKE 1 TABLET AS NEEDED FOR ERECTILE DYSFUNCTION 18 tablet 5    losartan (COZAAR) 50 MG tablet Take 50 mg by mouth Daily with supper      melatonin 3 MG TABS tablet Take 3 mg by mouth      metoprolol succinate (TOPROL XL) 25 MG extended release tablet Take 25 mg by mouth daily      rivaroxaban (XARELTO) 20 MG TABS tablet TAKE 1 TABLET DAILY WITH DINNER       No current facility-administered medications for this visit. Orders Placed This Encounter   Procedures    Lipid Panel     Standing Status:   Future     Number of Occurrences:   1     Standing Expiration Date:   11/21/2023    TSH with Reflex     Standing Status:   Future     Number of Occurrences:   1     Standing Expiration Date:   11/21/2023    PSA, Diagnostic     Standing Status:   Future     Number of Occurrences:   1     Standing Expiration Date:   11/21/2023       No orders of the defined types were placed in this encounter. Medications Discontinued During This Encounter   Medication Reason    carbamide peroxide (DEBROX) 6.5 % otic solution LIST CLEANUP        Diagnosis Orders   1. Primary hypertension  Lipid Panel    TSH with Reflex    TSH with Reflex    Lipid Panel      2. Medicare annual wellness visit, subsequent        3. Primary hyperparathyroidism ([facility] [address]        4. History of stroke with residual deficit        5. Hypercholesteremia        6. Vitamin D deficiency        7. Elevated PSA  PSA, Diagnostic    PSA, Diagnostic      8. Spinal stenosis, lumbar region without neurogenic claudication        9.  Nocturia           Wellness information reviewed and appropriate screening addressed. Advised patient to secure living will and healthcare POA. Maintains a healthy lifestyle. Is doing fairly well physically and emotionally. No doubt a component of 'white coat hypertension' with higher in-office readings than home readings. Document BP several times on two days weekly. Contact office if not <=130/80 consistently. Will discuss labs over the phone. Follow up as documented or earlier as needed. Not interested in vaccinations. Reviewed most recent labs. Return in 6 months (on 5/21/2023) for Medicare Annual Wellness Visit in 1 year. Bettie Islas MD  Medicare Annual Wellness Visit    Severo Pol is here for Follow-up (Pt presents to the office today for their follow-up for spinal stenosis.)    Assessment & Plan   Primary hypertension  -     Lipid Panel; Future  -     TSH with Reflex; Future  Medicare annual wellness visit, subsequent  Primary hyperparathyroidism ([address]  History of stroke with residual deficit  Hypercholesteremia  Vitamin D deficiency  Elevated PSA  -     PSA, Diagnostic; Future  Spinal stenosis, lumbar region without neurogenic claudication  Nocturia    Recommendations for Preventive Services Due: see orders and patient instructions/AVS.  Recommended screening schedule for the next 5-10 years is provided to the patient in written form: see Patient Instructions/AVS.     Return in 6 months (on 5/21/2023) for Medicare Annual Wellness Visit in 1 year. Subjective   The following acute and/or chronic problems were also addressed today:      Patient's complete Health Risk Assessment and screening values have been reviewed and are found in Flowsheets. The following problems were reviewed today and where indicated follow up appointments were made and/or referrals ordered.     Positive Risk Factor Screenings with Interventions:    Fall Risk:  Do you feel unsteady or are you worried about falling? : (!) yes  2 or more falls in past year?: no  Fall with injury in past year?: no   Fall Risk Interventions:    Participating in [place] and ACP:       [address] Will ACP-Advance Directive ACP-Power of     Not on File Not on File Not on File Not on File          General Health Risk Interventions:  No Living Will: Advance Care Planning addressed with patient today              Objective   Vitals:    11/21/22 0854 11/21/22 0856   BP: (!) 160/86 (!) 152/87   Site: Left Upper Arm Left Upper Arm   Position: Sitting Sitting   Cuff Size: Medium Adult Medium Adult   Pulse: 71    Temp: 98.6 °F (37 °C)    TempSrc: Temporal    SpO2: 100%    Weight: 176 lb 12.8 oz (80.2 kg)    Height: 6' (1.829 m)       Body mass index is 23.98 kg/m². No Known Allergies  Prior to Visit Medications    Medication Sig Taking?  Authorizing Provider   ergocalciferol (ERGOCALCIFEROL) 1.25 MG (85653 UT) capsule Day 1 and 15 of each month Yes Ajith Frazier MD   tadalafil (CIALIS) 20 MG tablet TAKE 1 TABLET AS NEEDED FOR ERECTILE DYSFUNCTION Yes Hermelinda Carey MD   losartan (COZAAR) 50 MG tablet Take 50 mg by mouth Daily with supper Yes Ar Automatic Reconciliation   melatonin 3 MG TABS tablet Take 3 mg by mouth Yes Ar Automatic Reconciliation   metoprolol succinate (TOPROL XL) 25 MG extended release tablet Take 25 mg by mouth daily Yes Ar Automatic Reconciliation   rivaroxaban (XARELTO) 20 MG TABS tablet TAKE 1 TABLET DAILY WITH DINNER Yes Ar Automatic Reconciliation       CareTeam (Including outside providers/suppliers regularly involved in providing care):   Patient Care Team:  Hermelinda Carey MD as PCP - Shahla Kennedy MD as PCP - Huey Emery MD as PCP - [facility] Empaneled Provider     Reviewed and updated this visit:  Tobacco  Allergies  Meds  Problems  Med Hx  Surg Hx  Soc Hx  Fam Hx

## 2022-11-22 ENCOUNTER — TELEPHONE (OUTPATIENT)
Dept: INTERNAL MEDICINE CLINIC | Facility: CLINIC | Age: 72
End: 2022-11-22

## 2022-11-22 NOTE — RESULT ENCOUNTER NOTE
Your labs look great except for your PSA being a little more elevated. I doubt this is a cause for concern, however I would like to repeat your PSA in a month to ensure that it is not further increasing. I ordered the above just needs to be scheduled. Thanks.

## 2022-11-22 NOTE — TELEPHONE ENCOUNTER
----- Message from Iraida Puckett MD sent at 11/22/2022  2:24 PM EST -----  Your labs look great except for your PSA being a little more elevated. I doubt this is a cause for concern, however I would like to repeat your PSA in a month to ensure that it is not further increasing. I ordered the above just needs to be scheduled. Thanks.

## 2022-12-07 ENCOUNTER — TELEPHONE (OUTPATIENT)
Dept: INTERNAL MEDICINE CLINIC | Facility: CLINIC | Age: 72
End: 2022-12-07

## 2022-12-07 NOTE — TELEPHONE ENCOUNTER
Left message for pt to schedule his PSA - this is due after 12/21/2022 ( lab ordered)     Pt notified of lab results by "[employer]".          ----- Message from Ganga Mann MD sent at 11/22/2022  2:24 PM EST -----  Your labs look great except for your PSA being a little more elevated. I doubt this is a cause for concern, however I would like to repeat your PSA in a month to ensure that it is not further increasing.

## 2022-12-08 ENCOUNTER — TELEPHONE (OUTPATIENT)
Dept: INTERNAL MEDICINE CLINIC | Facility: CLINIC | Age: 72
End: 2022-12-08

## 2022-12-08 DIAGNOSIS — E55.9 VITAMIN D DEFICIENCY: Primary | ICD-10-CM

## 2022-12-08 NOTE — TELEPHONE ENCOUNTER
Sent patient a [employer] message with the message and to contact us for a repeat PSA / Lab. Called and voice mail full.

## 2022-12-08 NOTE — TELEPHONE ENCOUNTER
----- Message from Darlin Beard LPN sent at 56/0/1123  9:00 AM EST -----  Subject: Referral Request    Reason for referral request? Patient would like to also have the Vitamin d   level checked during the repeat labs that he will have done  Provider patient wants to be referred to(if known):     Provider Phone Number(if known):     Additional Information for Provider?   ---------------------------------------------------------------------------  --------------  [facility]    [phone]; OK to leave message on voicemail  ---------------------------------------------------------------------------  --------------

## 2022-12-20 RX ORDER — LOSARTAN POTASSIUM 50 MG/1
TABLET ORAL
Qty: 90 TABLET | Refills: 3 | Status: SHIPPED | OUTPATIENT
Start: 2022-12-20

## 2022-12-20 RX ORDER — METOPROLOL SUCCINATE 25 MG/1
TABLET, EXTENDED RELEASE ORAL
Qty: 90 TABLET | Refills: 3 | Status: SHIPPED | OUTPATIENT
Start: 2022-12-20

## 2022-12-20 RX ORDER — RIVAROXABAN 20 MG/1
TABLET, FILM COATED ORAL
Qty: 90 TABLET | Refills: 3 | Status: SHIPPED | OUTPATIENT
Start: 2022-12-20

## 2022-12-22 ENCOUNTER — NURSE ONLY (OUTPATIENT)
Dept: INTERNAL MEDICINE CLINIC | Facility: CLINIC | Age: 72
End: 2022-12-22

## 2022-12-22 DIAGNOSIS — E55.9 VITAMIN D DEFICIENCY: ICD-10-CM

## 2022-12-22 DIAGNOSIS — R97.20 ELEVATED PSA: ICD-10-CM

## 2022-12-22 LAB
25(OH)D3 SERPL-MCNC: 54.9 NG/ML (ref 30–100)
PSA SERPL-MCNC: 4.8 NG/ML

## 2022-12-27 ENCOUNTER — TELEPHONE (OUTPATIENT)
Dept: INTERNAL MEDICINE CLINIC | Facility: CLINIC | Age: 72
End: 2022-12-27

## 2022-12-27 NOTE — TELEPHONE ENCOUNTER
Your blood pressure readings from home look great. Thanks for dropping off your documentation.

## 2022-12-27 NOTE — RESULT ENCOUNTER NOTE
Labs have have improved. Continue your current doses of medications. Keep up the good work. Hope you are feeling well. Thanks.   Tenzin Steele

## 2023-05-18 ASSESSMENT — LIFESTYLE VARIABLES
HOW OFTEN DO YOU HAVE A DRINK CONTAINING ALCOHOL: 2-4 TIMES A MONTH
HOW MANY STANDARD DRINKS CONTAINING ALCOHOL DO YOU HAVE ON A TYPICAL DAY: 1 OR 2

## 2023-05-18 ASSESSMENT — PATIENT HEALTH QUESTIONNAIRE - PHQ9
SUM OF ALL RESPONSES TO PHQ QUESTIONS 1-9: 0
2. FEELING DOWN, DEPRESSED OR HOPELESS: 0
SUM OF ALL RESPONSES TO PHQ QUESTIONS 1-9: 0
SUM OF ALL RESPONSES TO PHQ9 QUESTIONS 1 & 2: 0
SUM OF ALL RESPONSES TO PHQ QUESTIONS 1-9: 0
SUM OF ALL RESPONSES TO PHQ QUESTIONS 1-9: 0
1. LITTLE INTEREST OR PLEASURE IN DOING THINGS: 0

## 2023-05-25 ENCOUNTER — OFFICE VISIT (OUTPATIENT)
Dept: INTERNAL MEDICINE CLINIC | Facility: CLINIC | Age: 73
End: 2023-05-25
Payer: MEDICARE

## 2023-05-25 VITALS
BODY MASS INDEX: 22.89 KG/M2 | HEIGHT: 72 IN | WEIGHT: 169 LBS | HEART RATE: 92 BPM | SYSTOLIC BLOOD PRESSURE: 123 MMHG | DIASTOLIC BLOOD PRESSURE: 95 MMHG

## 2023-05-25 DIAGNOSIS — R20.2 NUMBNESS AND TINGLING IN RIGHT HAND: Primary | ICD-10-CM

## 2023-05-25 DIAGNOSIS — E78.00 HYPERCHOLESTEREMIA: ICD-10-CM

## 2023-05-25 DIAGNOSIS — R29.898 RIGHT HAND WEAKNESS: ICD-10-CM

## 2023-05-25 DIAGNOSIS — Z00.00 MEDICARE ANNUAL WELLNESS VISIT, SUBSEQUENT: ICD-10-CM

## 2023-05-25 DIAGNOSIS — E55.9 VITAMIN D DEFICIENCY: ICD-10-CM

## 2023-05-25 DIAGNOSIS — I69.30 HISTORY OF STROKE WITH RESIDUAL DEFICIT: ICD-10-CM

## 2023-05-25 DIAGNOSIS — E21.0 PRIMARY HYPERPARATHYROIDISM (HCC): ICD-10-CM

## 2023-05-25 DIAGNOSIS — N52.9 ERECTILE DYSFUNCTION, UNSPECIFIED ERECTILE DYSFUNCTION TYPE: ICD-10-CM

## 2023-05-25 DIAGNOSIS — I10 PRIMARY HYPERTENSION: ICD-10-CM

## 2023-05-25 DIAGNOSIS — R20.0 NUMBNESS AND TINGLING IN RIGHT HAND: Primary | ICD-10-CM

## 2023-05-25 DIAGNOSIS — R97.20 ELEVATED PSA: ICD-10-CM

## 2023-05-25 LAB
25(OH)D3 SERPL-MCNC: 82.7 NG/ML (ref 30–100)
ALBUMIN SERPL-MCNC: 4.1 G/DL (ref 3.2–4.6)
ALBUMIN/GLOB SERPL: 1.4 (ref 0.4–1.6)
ALP SERPL-CCNC: 83 U/L (ref 50–136)
ALT SERPL-CCNC: 34 U/L (ref 12–65)
ANION GAP SERPL CALC-SCNC: 1 MMOL/L (ref 2–11)
AST SERPL-CCNC: 30 U/L (ref 15–37)
BILIRUB SERPL-MCNC: 0.5 MG/DL (ref 0.2–1.1)
BUN SERPL-MCNC: 13 MG/DL (ref 8–23)
CALCIUM SERPL-MCNC: 10 MG/DL (ref 8.3–10.4)
CHLORIDE SERPL-SCNC: 107 MMOL/L (ref 101–110)
CO2 SERPL-SCNC: 27 MMOL/L (ref 21–32)
CREAT SERPL-MCNC: 0.7 MG/DL (ref 0.8–1.5)
GLOBULIN SER CALC-MCNC: 3 G/DL (ref 2.8–4.5)
GLUCOSE SERPL-MCNC: 95 MG/DL (ref 65–100)
POTASSIUM SERPL-SCNC: 4.3 MMOL/L (ref 3.5–5.1)
PROT SERPL-MCNC: 7.1 G/DL (ref 6.3–8.2)
PSA SERPL-MCNC: 5.8 NG/ML
SODIUM SERPL-SCNC: 135 MMOL/L (ref 133–143)

## 2023-05-25 PROCEDURE — G8427 DOCREV CUR MEDS BY ELIG CLIN: HCPCS | Performed by: INTERNAL MEDICINE

## 2023-05-25 PROCEDURE — 3080F DIAST BP >= 90 MM HG: CPT | Performed by: INTERNAL MEDICINE

## 2023-05-25 PROCEDURE — 1036F TOBACCO NON-USER: CPT | Performed by: INTERNAL MEDICINE

## 2023-05-25 PROCEDURE — G8420 CALC BMI NORM PARAMETERS: HCPCS | Performed by: INTERNAL MEDICINE

## 2023-05-25 PROCEDURE — 3017F COLORECTAL CA SCREEN DOC REV: CPT | Performed by: INTERNAL MEDICINE

## 2023-05-25 PROCEDURE — 99213 OFFICE O/P EST LOW 20 MIN: CPT | Performed by: INTERNAL MEDICINE

## 2023-05-25 PROCEDURE — 3074F SYST BP LT 130 MM HG: CPT | Performed by: INTERNAL MEDICINE

## 2023-05-25 PROCEDURE — 1123F ACP DISCUSS/DSCN MKR DOCD: CPT | Performed by: INTERNAL MEDICINE

## 2023-05-25 PROCEDURE — G0439 PPPS, SUBSEQ VISIT: HCPCS | Performed by: INTERNAL MEDICINE

## 2023-05-25 RX ORDER — ZOSTER VACCINE RECOMBINANT, ADJUVANTED 50 MCG/0.5
0.5 KIT INTRAMUSCULAR SEE ADMIN INSTRUCTIONS
Qty: 0.5 ML | Refills: 0 | Status: SHIPPED | OUTPATIENT
Start: 2023-05-25 | End: 2023-11-21

## 2023-05-25 RX ORDER — TADALAFIL 20 MG/1
20 TABLET ORAL PRN
Qty: 18 TABLET | Refills: 5 | Status: SHIPPED | OUTPATIENT
Start: 2023-05-25

## 2023-05-25 SDOH — ECONOMIC STABILITY: FOOD INSECURITY: WITHIN THE PAST 12 MONTHS, THE FOOD YOU BOUGHT JUST DIDN'T LAST AND YOU DIDN'T HAVE MONEY TO GET MORE.: NEVER TRUE

## 2023-05-25 SDOH — ECONOMIC STABILITY: HOUSING INSECURITY
IN THE LAST 12 MONTHS, WAS THERE A TIME WHEN YOU DID NOT HAVE A STEADY PLACE TO SLEEP OR SLEPT IN A SHELTER (INCLUDING NOW)?: NO

## 2023-05-25 SDOH — ECONOMIC STABILITY: INCOME INSECURITY: HOW HARD IS IT FOR YOU TO PAY FOR THE VERY BASICS LIKE FOOD, HOUSING, MEDICAL CARE, AND HEATING?: NOT HARD AT ALL

## 2023-05-25 SDOH — ECONOMIC STABILITY: FOOD INSECURITY: WITHIN THE PAST 12 MONTHS, YOU WORRIED THAT YOUR FOOD WOULD RUN OUT BEFORE YOU GOT MONEY TO BUY MORE.: NEVER TRUE

## 2023-05-25 ASSESSMENT — ENCOUNTER SYMPTOMS
DIARRHEA: 0
NAUSEA: 0
VOMITING: 0
CONSTIPATION: 0
WHEEZING: 0
SHORTNESS OF BREATH: 0
COUGH: 0

## 2023-05-25 ASSESSMENT — PATIENT HEALTH QUESTIONNAIRE - PHQ9
SUM OF ALL RESPONSES TO PHQ QUESTIONS 1-9: 0
2. FEELING DOWN, DEPRESSED OR HOPELESS: 0
SUM OF ALL RESPONSES TO PHQ QUESTIONS 1-9: 0
SUM OF ALL RESPONSES TO PHQ QUESTIONS 1-9: 0
1. LITTLE INTEREST OR PLEASURE IN DOING THINGS: 0
SUM OF ALL RESPONSES TO PHQ9 QUESTIONS 1 & 2: 0
SUM OF ALL RESPONSES TO PHQ QUESTIONS 1-9: 0

## 2023-05-25 NOTE — PATIENT INSTRUCTIONS
of heart disease by raising cholesterol levels.     Limit the amount of solid fat-butter, margarine, and shortening-you eat. Use olive, peanut, or canola oil when you cook. Bake, broil, and steam foods instead of frying them.     Eat a variety of fruit and vegetables every day. Dark green, deep orange, red, or yellow fruits and vegetables are especially good for you. Examples include spinach, carrots, peaches, and berries.     Foods high in fiber can reduce your cholesterol and provide important vitamins and minerals. High-fiber foods include whole-grain cereals and breads, oatmeal, beans, brown rice, citrus fruits, and apples.     Eat lean proteins. Heart-healthy proteins include seafood, lean meats and poultry, eggs, beans, peas, nuts, seeds, and soy products.     Limit drinks and foods with added sugar. These include candy, desserts, and soda pop. Lifestyle changes    If your doctor recommends it, get more exercise. Walking is a good choice. Bit by bit, increase the amount you walk every day. Try for at least 30 minutes on most days of the week. You also may want to swim, bike, or do other activities.     Do not smoke. If you need help quitting, talk to your doctor about stop-smoking programs and medicines. These can increase your chances of quitting for good. Quitting smoking may be the most important step you can take to protect your heart. It is never too late to quit.     Limit alcohol to 2 drinks a day for men and 1 drink a day for women. Too much alcohol can cause health problems.     Manage other health problems such as diabetes, high blood pressure, and high cholesterol. If you think you may have a problem with alcohol or drug use, talk to your doctor. Medicines    Take your medicines exactly as prescribed. Call your doctor if you think you are having a problem with your medicine.     If your doctor recommends aspirin, take the amount directed each day.  Make sure you take aspirin and not another kind

## 2023-05-25 NOTE — PROGRESS NOTES
symptoms. Will check on patient via [employer] in two weeks to ensure improving. Will discuss labs over the phone. Will refer as above due to prior relationship and worsening symptoms. Follow up as documented or earlier as needed. Return in about 6 months (around 11/25/2023). Juan Cavazos MD      Medicare Annual Wellness Visit    Jenny Thompson is here for Medicare AWV (Subsequent ), 6 Month Follow-Up (6 month f/u), and Tingling (Complains with tingling in his hands - Right hand is worse. Saw Dr. Magalie Santana in 2019 and had surgery. Wants to know if he needs another referral?/)    Assessment & Plan   Numbness and tingling in right hand  -     External Referral To Orthopedic Surgery  Primary hypertension  -     Comprehensive Metabolic Panel; Future  Primary hyperparathyroidism (HCC)  Elevated PSA  -     PSA, Diagnostic; Future  Vitamin D deficiency  -     Vitamin D 25 Hydroxy; Future  Hypercholesteremia  Erectile dysfunction, unspecified erectile dysfunction type  Medicare annual wellness visit, subsequent  Right hand weakness  -     External Referral To Orthopedic Surgery  History of stroke with residual deficit      Recommendations for Preventive Services Due: see orders and patient instructions/AVS.  Recommended screening schedule for the next 5-10 years is provided to the patient in written form: see Patient Instructions/AVS.     Return in about 6 months (around 11/25/2023). Subjective       Patient's complete Health Risk Assessment and screening values have been reviewed and are found in Flowsheets. The following problems were reviewed today and where indicated follow up appointments were made and/or referrals ordered.     Positive Risk Factor Screenings with Interventions:                                       Objective   Vitals:    05/25/23 0837   BP: (!) 123/95   Site: Left Upper Arm   Position: Sitting   Cuff Size: Medium Adult   Pulse: 92   Weight: 169 lb (76.7 kg)   Height: 6'

## 2023-05-29 ENCOUNTER — TELEPHONE (OUTPATIENT)
Dept: INTERNAL MEDICINE CLINIC | Facility: CLINIC | Age: 73
End: 2023-05-29

## 2023-05-29 DIAGNOSIS — R97.20 ELEVATED PSA: Primary | ICD-10-CM

## 2023-05-29 NOTE — TELEPHONE ENCOUNTER
Labs look good except PSA. Due to continuing elevation, referring you on to urologist.  [facility] you are feeling well. Thanks.   Tenzin Steele

## 2023-06-20 ENCOUNTER — OFFICE VISIT (OUTPATIENT)
Dept: ORTHOPEDIC SURGERY | Age: 73
End: 2023-06-20
Payer: MEDICARE

## 2023-06-20 DIAGNOSIS — G56.03 BILATERAL CARPAL TUNNEL SYNDROME: Primary | ICD-10-CM

## 2023-06-20 DIAGNOSIS — G56.22 CUBITAL TUNNEL SYNDROME ON LEFT: ICD-10-CM

## 2023-06-20 DIAGNOSIS — G56.21 CUBITAL TUNNEL SYNDROME ON RIGHT: ICD-10-CM

## 2023-06-20 PROCEDURE — 99204 OFFICE O/P NEW MOD 45 MIN: CPT | Performed by: ORTHOPAEDIC SURGERY

## 2023-06-20 PROCEDURE — G8420 CALC BMI NORM PARAMETERS: HCPCS | Performed by: ORTHOPAEDIC SURGERY

## 2023-06-20 PROCEDURE — 1123F ACP DISCUSS/DSCN MKR DOCD: CPT | Performed by: ORTHOPAEDIC SURGERY

## 2023-06-20 PROCEDURE — G8428 CUR MEDS NOT DOCUMENT: HCPCS | Performed by: ORTHOPAEDIC SURGERY

## 2023-06-20 PROCEDURE — 3017F COLORECTAL CA SCREEN DOC REV: CPT | Performed by: ORTHOPAEDIC SURGERY

## 2023-06-20 PROCEDURE — 1036F TOBACCO NON-USER: CPT | Performed by: ORTHOPAEDIC SURGERY

## 2023-06-20 NOTE — PROGRESS NOTES
provided and the formulated plan. All questions were answered to the patient's satisfaction during the encounter.     Jefrey Goldberg, MD  Orthopaedic Surgery  06/20/23  1:25 PM

## 2023-07-06 DIAGNOSIS — R97.20 ELEVATED PSA: Primary | ICD-10-CM

## 2023-07-06 NOTE — PROGRESS NOTES
[facility] Hematology & Oncology  [facility], [address] [place] [address]  [phone]          Jessica Luna  : 1950      INITIAL EVALUATION        HPI: Jessica Luna is a 72 y.o. male with elevated PSA. Patient is here today with his wife. They live in [place]. He is from [place] originally. They also lived in [place] for about 5 years. His PSA on 2023 was 5.8. It has been as high as 5.1 in 2022 and 2020 was 4.7. He has never had a prostate biopsy or prostate MRI. He has no family history of prostate cancer or other malignancies. He denies any gross hematuria or significant lower urinary tract symptoms. He has not had any prior urinary tract infections or prostatitis. Of note he is on Xarelto for a prior CVA that he had 3 to 4 years ago. Other than some a flutter and hypertension he is otherwise healthy. From abstract:  46-year-old white male     23 - Met with PCP for 6-month check follow-up visit. Elevated PSA of 5.8 (EPIC)  Persistent elevated PSA levels dating back to 2020. (EPIC/Labs)     A referral has been placed to [facility] for a Urology/Oncology consultation and treatment.     PMH:     Past Medical History:   Diagnosis Date    Actinic keratosis     Adverse effect of anesthesia     liver enzymes increased for 1 week following general anesthesia for shoulder sx    Anxiety     Atrial flutter ([address])     s/p ablation 21    CVA (cerebral vascular accident) ([address]) 10/23/2018    numbness remains in R hand    Erectile dysfunction     Former smoker     Quit 2018--- 1 ppd x 40 yrs    Hypertension     managed with med    Osteoarthritis     feet--- surg for correction    Pain in joint involving pelvic region and thigh     Rosacea     Wide-complex tachycardia 2021    pt has ILR in place and is followed [place] Corinne [place] [facility]        PSH:    Past Surgical History:   Procedure Laterality Date    ABLATION

## 2023-07-06 NOTE — PROGRESS NOTES
New Patient Abstract (Uro/Onc)    MRN:  [id number]     PATIENT'S NAME:  Angel Suarez     LAST SEEN UROLOGIST: N/A    PCP:  Davy Kerr MD    CHIEF COMPLAINT: Elevated PSA    PMH: Anxiety, A-flutter, ED, former smoker, HTN, CVA    FAMILY HX: No known family history    NARRATIVE WITH RECENT WITH RESULTS/PROCEDURES/BIOPSIES AND DATES COMPLETED:     81-year-old white male    5/25/23 - Met with PCP for 6-month check follow-up visit. Elevated PSA of 5.8 (EPIC)  Persistent elevated PSA levels dating back to 11/2020. (EPIC/Labs)    A referral has been placed to [facility] for a Urology/Oncology consultation and treatment.     PROCEDURES/PATHOLOGY: N/A    LABS: (EPIC)    11/05/20 10:09 05/10/21 10:47 11/10/21 09:13 12/10/21 08:49 05/11/22 08:50 11/21/22 09:36 12/22/22 08:16 05/25/23 09:06   4.7 (H) 4.6 (H) 5.0 (H) 4.3 (H) 4.8 (H) 5.1 (H) 4.8 (H) 5.8 (H)     IMAGING: N/A

## 2023-07-07 ENCOUNTER — HOSPITAL ENCOUNTER (OUTPATIENT)
Dept: LAB | Age: 73
End: 2023-07-07
Payer: MEDICARE

## 2023-07-07 ENCOUNTER — PATIENT MESSAGE (OUTPATIENT)
Dept: INTERNAL MEDICINE CLINIC | Facility: CLINIC | Age: 73
End: 2023-07-07

## 2023-07-07 ENCOUNTER — OFFICE VISIT (OUTPATIENT)
Dept: ONCOLOGY | Age: 73
End: 2023-07-07
Payer: MEDICARE

## 2023-07-07 VITALS
HEIGHT: 70 IN | BODY MASS INDEX: 24.05 KG/M2 | SYSTOLIC BLOOD PRESSURE: 130 MMHG | HEART RATE: 71 BPM | TEMPERATURE: 99.7 F | OXYGEN SATURATION: 99 % | RESPIRATION RATE: 16 BRPM | WEIGHT: 168 LBS | DIASTOLIC BLOOD PRESSURE: 73 MMHG

## 2023-07-07 DIAGNOSIS — R97.20 ELEVATED PSA: Primary | ICD-10-CM

## 2023-07-07 DIAGNOSIS — F41.9 ANXIETY: Primary | ICD-10-CM

## 2023-07-07 DIAGNOSIS — G47.09 OTHER INSOMNIA: ICD-10-CM

## 2023-07-07 DIAGNOSIS — R97.20 ELEVATED PSA: ICD-10-CM

## 2023-07-07 LAB
PSA FREE MFR SERPL: 9.4 %
PSA FREE SERPL-MCNC: 0.5 NG/ML
PSA SERPL-MCNC: 5.3 NG/ML

## 2023-07-07 PROCEDURE — 36415 COLL VENOUS BLD VENIPUNCTURE: CPT

## 2023-07-07 PROCEDURE — G8420 CALC BMI NORM PARAMETERS: HCPCS | Performed by: UROLOGY

## 2023-07-07 PROCEDURE — 1036F TOBACCO NON-USER: CPT | Performed by: UROLOGY

## 2023-07-07 PROCEDURE — G8427 DOCREV CUR MEDS BY ELIG CLIN: HCPCS | Performed by: UROLOGY

## 2023-07-07 PROCEDURE — 99204 OFFICE O/P NEW MOD 45 MIN: CPT | Performed by: UROLOGY

## 2023-07-07 PROCEDURE — 3075F SYST BP GE 130 - 139MM HG: CPT | Performed by: UROLOGY

## 2023-07-07 PROCEDURE — 3017F COLORECTAL CA SCREEN DOC REV: CPT | Performed by: UROLOGY

## 2023-07-07 PROCEDURE — 3078F DIAST BP <80 MM HG: CPT | Performed by: UROLOGY

## 2023-07-07 PROCEDURE — 1123F ACP DISCUSS/DSCN MKR DOCD: CPT | Performed by: UROLOGY

## 2023-07-07 PROCEDURE — 84154 ASSAY OF PSA FREE: CPT

## 2023-07-07 PROCEDURE — 84153 ASSAY OF PSA TOTAL: CPT

## 2023-07-07 ASSESSMENT — ENCOUNTER SYMPTOMS
GASTROINTESTINAL NEGATIVE: 1
RESPIRATORY NEGATIVE: 1

## 2023-07-07 NOTE — PATIENT INSTRUCTIONS
Patient Instructions from Today's Visit    Reason for Visit:  New Patient - elevated PSA    Diagnosis Information:  https://www.[employer]/. net/about-us/asco-answers-patient-education-materials/tqnq-qdouibj-atze-sheets    Plan:  There are multiple reasons that could cause your PSA to elevated. Trauma, infection and inflammation are the most common. Of course Prostate Cancer is a possibility, however it is not the most common. Your PSA has stayed relatively stable over a three year period. It is higher than normal, but has stayed within the same range. Your digital rectal exam today is almost completely normal. Your prostate is slightly enlarged, but nothing that is really abnormal.  You will have a MRI of your prostate and we will discuss your results at your next appointment. Follow Up: You will see Dr. Shamika Mcgowan about a week after your MRI. Recent Lab Results:  N/A    Treatment Summary has been discussed and given to patient: N/A        -------------------------------------------------------------------------------------------------------------------  Please call our office at [phone] if you have any  of the following symptoms:   Fever of 100.5 or greater  Chills  Shortness of breath  Swelling or pain in one leg    After office hours an answering service is available and will contact a provider for emergencies or if you are experiencing any of the above symptoms. Patient does express an interest in My Chart. My Chart log in information explained on the after visit summary printout at the [address] desk.     Bolivar Reyes MA

## 2023-07-10 RX ORDER — ALPRAZOLAM 2 MG/1
1-2 TABLET ORAL NIGHTLY PRN
Qty: 15 TABLET | Refills: 0 | Status: SHIPPED | OUTPATIENT
Start: 2023-07-10 | End: 2023-08-09

## 2023-07-10 NOTE — TELEPHONE ENCOUNTER
From: Bennie Meade  To: Dr. Yao Lynnocks: 7/7/2023 10:19 AM EDT  Subject: Xanax refill    Hi Dr. Angelique Thomson,  Would you refill my Xanax prescription please? We are flying out to [place] at the end of the month. It's 2 flights there and back again. For a trip like this I would ask Dr. Sukumar Nguyen for 4, 2 mg pills. The extras are for if we got stopped half way for some reason. When we get back home I flush the unused 2 pills. I use the the [employer] here in [place]. Thanks so much.   Reena Miller

## 2023-08-04 ENCOUNTER — TELEPHONE (OUTPATIENT)
Dept: ONCOLOGY | Age: 73
End: 2023-08-04

## 2023-08-04 NOTE — TELEPHONE ENCOUNTER
Physician provider: Deepa Hansen  Reason for today's call: Cx'd MRI   Last office visit: n/a      Pt called stating his MRI appt was Cx'd for 23 and RS to 23 at alternate location. Pt asks for call to update on status of this issue and asks if he needs to RS his F/U appt as well.

## 2023-08-08 ENCOUNTER — TELEPHONE (OUTPATIENT)
Dept: RADIATION ONCOLOGY | Age: 73
End: 2023-08-08

## 2023-08-08 NOTE — TELEPHONE ENCOUNTER
Pt called stating he was unable to have MRI at the [facility] due to metal in his body so they belen it to [facility]. Pt wants to make sure its ok to have it there. He said Dr Ayleen Evans specifically wanted it done on the machine at [facility] because of the power of that machine so is it ok to have it done at [facility]?

## 2023-08-21 ENCOUNTER — HOSPITAL ENCOUNTER (OUTPATIENT)
Dept: MRI IMAGING | Age: 73
Discharge: HOME | End: 2023-08-24
Attending: UROLOGY
Payer: MEDICARE

## 2023-08-21 DIAGNOSIS — R97.20 ELEVATED PSA: ICD-10-CM

## 2023-08-21 PROCEDURE — 6360000004 HC RX CONTRAST MEDICATION: Performed by: UROLOGY

## 2023-08-21 PROCEDURE — 72197 MRI PELVIS W/O & W/DYE: CPT

## 2023-08-21 PROCEDURE — 2580000003 HC RX 258: Performed by: UROLOGY

## 2023-08-21 PROCEDURE — A9579 GAD-BASE MR CONTRAST NOS,1ML: HCPCS | Performed by: UROLOGY

## 2023-08-21 RX ORDER — SODIUM CHLORIDE 0.9 % (FLUSH) 0.9 %
40 SYRINGE (ML) INJECTION AS NEEDED
Status: DISCONTINUED | OUTPATIENT
Start: 2023-08-21 | End: 2023-08-25 | Stop reason: HOSPADM

## 2023-08-21 RX ADMIN — GADOTERIDOL 15 ML: 279.3 INJECTION, SOLUTION INTRAVENOUS at 21:22

## 2023-08-21 RX ADMIN — SODIUM CHLORIDE, PRESERVATIVE FREE 40 ML: 5 INJECTION INTRAVENOUS at 21:22

## 2023-08-22 NOTE — PROGRESS NOTES
extremities  Psych: normal mood and affect, alert, oriented x 3  LE:  no edema  GI: soft, nontender, no masses, no CVA tenderness  : DEFERRED       LABORATORY RESULTS:    Lab Results   Component Value Date/Time    PSA 5.3 07/07/2023 01:06 PM    PSA 5.8 05/25/2023 09:06 AM    PSA 4.8 12/22/2022 08:16 AM            IMAGING:    XR Results:    === 03/25/22 ===    XR HIP 2-3 VW W PELVIS LEFT    - Narrative -  AUTOMATIC ADMINISTRATIVE RESULT    The result for this exam can be found in the Progress note in the chart. - Impression -  :  See Progress note in the chart. MRI Results:    === 08/21/23 ===    MRI PELVIS W WO CONTRAST    - Narrative -  EXAMINATION: MRI PELVIS W WO CONTRAST 8/21/2023 9:35 PM    ACCESSION NUMBER: [id number]    COMPARISON: None available    INDICATION: Elevated prostate specific antigen (PSA)    TECHNIQUE:  Multiplanar, multisequence MR imaging was performed of the prostate  prior to and following gadolinium contrast.  15 mL Prohance contrast material  was administrated intravenously for the examination. This study was acquired  following the IV administration of contrast material, given the patient's  indications for the examination. If IV contrast material had not been  administered, the likely of detecting abnormalities relevant to the patient's  condition would have been substantially decreased. FINDINGS:  Demineralized susceptibility effect in the evaluation of diffusion imaging. Last PSA: 5.8  Prostate Size: 4.5 x 4.3 x 3.1 cm with a calculated volume of  31.2 mL. PSA Density: 0.19 ng/mL    BPH: Mild cystic and nodular BPH. Hemorrhage: None. Peripheral Zone:No suspicious peripheral zone lesion. Scattered linear and  wedge-shaped T2 hypointensities suggesting sequelae of prior prostatitis and/or  fibrosis. Transition/Central Zone: Focal lesion described below.     Lesion # 1  -Size and location: 1.0 x 0.7 cm T2 homogenous lesion within the anterior aspect  of the

## 2023-08-25 ENCOUNTER — OFFICE VISIT (OUTPATIENT)
Dept: ONCOLOGY | Age: 73
End: 2023-08-25
Payer: MEDICARE

## 2023-08-25 VITALS
HEART RATE: 78 BPM | BODY MASS INDEX: 24.01 KG/M2 | DIASTOLIC BLOOD PRESSURE: 76 MMHG | SYSTOLIC BLOOD PRESSURE: 128 MMHG | OXYGEN SATURATION: 96 % | RESPIRATION RATE: 16 BRPM | HEIGHT: 70 IN | WEIGHT: 167.7 LBS | TEMPERATURE: 97.9 F

## 2023-08-25 DIAGNOSIS — R97.20 ELEVATED PSA: Primary | ICD-10-CM

## 2023-08-25 PROCEDURE — 3017F COLORECTAL CA SCREEN DOC REV: CPT | Performed by: UROLOGY

## 2023-08-25 PROCEDURE — 1036F TOBACCO NON-USER: CPT | Performed by: UROLOGY

## 2023-08-25 PROCEDURE — G8427 DOCREV CUR MEDS BY ELIG CLIN: HCPCS | Performed by: UROLOGY

## 2023-08-25 PROCEDURE — 3078F DIAST BP <80 MM HG: CPT | Performed by: UROLOGY

## 2023-08-25 PROCEDURE — 99213 OFFICE O/P EST LOW 20 MIN: CPT | Performed by: UROLOGY

## 2023-08-25 PROCEDURE — G8420 CALC BMI NORM PARAMETERS: HCPCS | Performed by: UROLOGY

## 2023-08-25 PROCEDURE — 1123F ACP DISCUSS/DSCN MKR DOCD: CPT | Performed by: UROLOGY

## 2023-08-25 PROCEDURE — 3074F SYST BP LT 130 MM HG: CPT | Performed by: UROLOGY

## 2023-08-25 ASSESSMENT — PATIENT HEALTH QUESTIONNAIRE - PHQ9: DEPRESSION UNABLE TO ASSESS: PT REFUSES

## 2023-08-25 ASSESSMENT — ENCOUNTER SYMPTOMS
RESPIRATORY NEGATIVE: 1
GASTROINTESTINAL NEGATIVE: 1

## 2023-08-25 NOTE — PATIENT INSTRUCTIONS
Patient Instructions from Today's Visit    Reason for Visit:  Follow up     Diagnosis Information:  https://[email or web address]/. net/about-us/asco-answers-patient-education-materials/dewf-hvshmdd-jzqy-sheets      Plan:  Reviewed MRI. Average size prostate. There is one area in the prostate that scored a PI-RADS of 4. There is nothing outside of the prostate. We would recommend biopsying the prostate. You will need to come off your blood thinner as directed prior to the biopsy. You can also wait and not do a biopsy and do what is called active surveillance. A biopsy would entail going through the perineal area into the prostate while under anesthesia. Follow Up: We will schedule a prostate biospy. Please see information regarding the biopsy in this note    Recent Lab Results:  NA    Treatment Summary has been discussed and given to patient:     NA    -------------------------------------------------------------------------------------------------------------------    Patient does express an interest in My Chart. My Chart log in information explained on the after visit summary printout at the [address] desk. Catalino Miller, RN, BSN    PRE-PROSTATE BIOPSY INFORMATION     The procedure involves an ultrasound probe which will be inserted into your rectum to visualize the prostate. A needle with lidocaine will be injected to numb the prostate gland. Once the gland is numb they will insert a biopsy needle to get multiple samples from your prostate gland. A pathologist will check the samples for cancer. The results from the pathologist can take 10-14 days to be finalized. If you haven't heard back in a reasonable time please call. Before your biopsy:    -You will be given antibiotics prior to your biopsy to help decrease the risk of infection.  Be sure and call your doctor or come to the Emergency Department if you experience fever or chills after your biopsy.    -Nothing to eat or drink after midnight prior to your

## 2023-08-29 ENCOUNTER — PROCEDURE VISIT (OUTPATIENT)
Dept: PHYSICAL MEDICINE AND REHAB | Age: 73
End: 2023-08-29
Payer: MEDICARE

## 2023-08-29 VITALS
DIASTOLIC BLOOD PRESSURE: 87 MMHG | WEIGHT: 167 LBS | BODY MASS INDEX: 23.91 KG/M2 | SYSTOLIC BLOOD PRESSURE: 167 MMHG | HEIGHT: 70 IN

## 2023-08-29 DIAGNOSIS — G56.23 ULNAR NEUROPATHY OF BOTH UPPER EXTREMITIES: Primary | ICD-10-CM

## 2023-08-29 PROCEDURE — 95913 NRV CNDJ TEST 13/> STUDIES: CPT | Performed by: PHYSICAL MEDICINE & REHABILITATION

## 2023-08-31 ENCOUNTER — PREP FOR PROCEDURE (OUTPATIENT)
Dept: ONCOLOGY | Age: 73
End: 2023-08-31

## 2023-09-05 ENCOUNTER — OFFICE VISIT (OUTPATIENT)
Dept: ORTHOPEDIC SURGERY | Age: 73
End: 2023-09-05
Payer: MEDICARE

## 2023-09-05 DIAGNOSIS — G56.03 BILATERAL CARPAL TUNNEL SYNDROME: Primary | ICD-10-CM

## 2023-09-05 DIAGNOSIS — G56.22 CUBITAL TUNNEL SYNDROME ON LEFT: ICD-10-CM

## 2023-09-05 DIAGNOSIS — G56.21 CUBITAL TUNNEL SYNDROME ON RIGHT: ICD-10-CM

## 2023-09-05 DIAGNOSIS — G56.21 GUYON SYNDROME, RIGHT: ICD-10-CM

## 2023-09-05 DIAGNOSIS — G56.22 GUYON SYNDROME, LEFT: ICD-10-CM

## 2023-09-05 PROCEDURE — 20526 THER INJECTION CARP TUNNEL: CPT | Performed by: ORTHOPAEDIC SURGERY

## 2023-09-05 PROCEDURE — G8428 CUR MEDS NOT DOCUMENT: HCPCS | Performed by: ORTHOPAEDIC SURGERY

## 2023-09-05 PROCEDURE — 99215 OFFICE O/P EST HI 40 MIN: CPT | Performed by: ORTHOPAEDIC SURGERY

## 2023-09-05 PROCEDURE — G8420 CALC BMI NORM PARAMETERS: HCPCS | Performed by: ORTHOPAEDIC SURGERY

## 2023-09-05 PROCEDURE — 1036F TOBACCO NON-USER: CPT | Performed by: ORTHOPAEDIC SURGERY

## 2023-09-05 PROCEDURE — 1123F ACP DISCUSS/DSCN MKR DOCD: CPT | Performed by: ORTHOPAEDIC SURGERY

## 2023-09-05 PROCEDURE — 3017F COLORECTAL CA SCREEN DOC REV: CPT | Performed by: ORTHOPAEDIC SURGERY

## 2023-09-05 RX ORDER — BETAMETHASONE SODIUM PHOSPHATE AND BETAMETHASONE ACETATE 3; 3 MG/ML; MG/ML
6 INJECTION, SUSPENSION INTRA-ARTICULAR; INTRALESIONAL; INTRAMUSCULAR; SOFT TISSUE ONCE
Status: COMPLETED | OUTPATIENT
Start: 2023-09-05 | End: 2023-09-05

## 2023-09-05 RX ADMIN — BETAMETHASONE SODIUM PHOSPHATE AND BETAMETHASONE ACETATE 6 MG: 3; 3 INJECTION, SUSPENSION INTRA-ARTICULAR; INTRALESIONAL; INTRAMUSCULAR; SOFT TISSUE at 11:19

## 2023-09-05 NOTE — PROGRESS NOTES
Orthopaedic Hand Clinic Note    Name: Bryson Arevalo  Age: 72 y.o. YOB: 1950  Gender: male  MRN: [id number]      Follow up visit:   1. Bilateral carpal tunnel syndrome    2. 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 Cubital tunnel syndrome on left    4. Guyon syndrome, right    5. Guyon syndrome, left        HPI: Bryson Arevalo is a 72 y.o. male who is following up for bilateral hand numbness and paresthesias, to recap his history, he had bilateral cubital tunnel release in 2019, he did very well with that, he reports that his numbness had resolved and his dexterity had improved significantly, he reports recurrent numbness in all fingers of bilateral hands, on the last visit we obtained a nerve conduction study to assess for possible recurrent cubital tunnel syndrome. ROS/Meds/PSH/PMH/FH/SH: I personally reviewed the patients standard intake form. Pertinents are discussed in the HPI    Physical Examination:  General: Awake and alert. HEENT: Normocephalic, atraumatic  CV/Pulm: Breathing even and unlabored  Skin: No obvious rashes noted. Lymphatic: No obvious evidence of lymphedema or lymphadenopathy    Musculoskeletal Examination:  Examination on the bilateral upper extremity demonstrates cap refill < 5 seconds in all fingers, subjectively equal sensation in median and ulnar distribution on the left hand however, on the right there is a clearly distinct decreased sensation on the ulnar side of the ring finger compared to the radial side, he has a positive Tinel at the carpal tunnel bilaterally, equivocal Tinel at Guyon's canal, equivocal carpal tunnel compression test, equivocal Guyon's compression test, positive Tinel at the cubital tunnel, equivocal elbow flexion compression test, there is significant interossei and thumb adductor weakness and wasting`.     Imaging / Electrodiagnostic Tests:     Previous nerve conduction study from 2 to 3 years ago was reviewed and

## 2023-09-19 DIAGNOSIS — E55.9 VITAMIN D DEFICIENCY: ICD-10-CM

## 2023-09-19 RX ORDER — ERGOCALCIFEROL 1.25 MG/1
CAPSULE ORAL
Qty: 6 CAPSULE | Refills: 1 | Status: SHIPPED | OUTPATIENT
Start: 2023-09-19

## 2023-09-19 NOTE — TELEPHONE ENCOUNTER
Pt is requesting a refill on pended medication. Pharmacy was verified.   Last OV: 11/7/22  Next OV:  11/7/23

## 2023-09-26 RX ORDER — AMOXICILLIN 250 MG/1
CAPSULE ORAL
Qty: 4 CAPSULE | Refills: 0 | Status: SHIPPED | OUTPATIENT
Start: 2023-09-26

## 2023-09-26 NOTE — TELEPHONE ENCOUNTER
Pt sent [employer] message in regards to needing amoxicillin rx prior to prostate bx on 10/03 due to bilateral hip replacements. Per Dr. Ramana Brown this is okay and is being pended to him for signature. I will notify pt through [employer] message of rx being sent to pharmacy.

## 2023-09-29 NOTE — PERIOP NOTE
Patient verified name and . Order for consent NOT found in EHR at time of PAT visit. Unable to verify case posting against order; surgery verified by patient. Type 1a surgery, PAT phone assessment complete. Orders not received. Labs per surgeon: unknown; no orders received    Labs per anesthesia protocol: none indicated    Patient answered medical/surgical history questions at their best of ability. All prior to admission medications documented in EPIC. Patient instructed to take the following medications the day of surgery according to anesthesia guidelines with a small sip of water: amoxicillin  Hold all vitamins 7 days prior to surgery and NSAIDS 5 days prior to surgery. Prescription meds to hold:vitamins and supplements, tadalafil. Patient to be advised on whether or not to hold anticoagulant by the surgeon. Patient instructed on the following:    > Arrive at [facility], time of arrival to be called the day before by 1700  > NPO after midnight, unless otherwise indicated, including gum, mints, and ice chips  > Responsible adult must drive patient to the hospital, stay during surgery, and patient will need supervision 24 hours after anesthesia  > Use antibacterial soap in shower the night before surgery and on the morning of surgery  > All piercings must be removed prior to arrival.    > Leave all valuables (money and jewelry) at home but bring insurance card and ID on DOS.   > You may be required to pay a deductible or co-pay on the day of your procedure. You can pre-pay by calling [phone] if your surgery is at the [facility] or [phone] if your surgery is at the [facility]. > Do not wear make-up, nail polish, lotions, cologne, perfumes, powders, or oil on skin. Artificial nails are not permitted.

## 2023-10-02 ENCOUNTER — ANESTHESIA EVENT (OUTPATIENT)
Dept: SURGERY | Age: 73
End: 2023-10-02
Payer: MEDICARE

## 2023-10-03 ENCOUNTER — ANESTHESIA (OUTPATIENT)
Dept: SURGERY | Age: 73
End: 2023-10-03
Payer: MEDICARE

## 2023-10-03 ENCOUNTER — HOSPITAL ENCOUNTER (OUTPATIENT)
Age: 73
Setting detail: OUTPATIENT SURGERY
Discharge: HOME | End: 2023-10-03
Attending: UROLOGY | Admitting: UROLOGY
Payer: MEDICARE

## 2023-10-03 VITALS
OXYGEN SATURATION: 98 % | HEART RATE: 60 BPM | RESPIRATION RATE: 16 BRPM | TEMPERATURE: 98.1 F | SYSTOLIC BLOOD PRESSURE: 139 MMHG | BODY MASS INDEX: 23.96 KG/M2 | WEIGHT: 167.4 LBS | DIASTOLIC BLOOD PRESSURE: 75 MMHG | HEIGHT: 70 IN

## 2023-10-03 LAB
APPEARANCE UR: CLEAR
BILIRUB UR QL: NEGATIVE
COLOR UR: ABNORMAL
GLUCOSE UR STRIP.AUTO-MCNC: NEGATIVE MG/DL
HGB UR QL STRIP: NEGATIVE
KETONES UR QL STRIP.AUTO: ABNORMAL MG/DL
LEUKOCYTE ESTERASE UR QL STRIP.AUTO: NEGATIVE
NITRITE UR QL STRIP.AUTO: NEGATIVE
PH UR STRIP: 6 (ref 5–9)
PROT UR STRIP-MCNC: NEGATIVE MG/DL
SP GR UR REFRACTOMETRY: 1.01 (ref 1–1.02)
UROBILINOGEN UR QL STRIP.AUTO: 0.2 EU/DL (ref 0.2–1)

## 2023-10-03 PROCEDURE — 7100000000 HC PACU RECOVERY - FIRST 15 MIN: Performed by: UROLOGY

## 2023-10-03 PROCEDURE — 81003 URINALYSIS AUTO W/O SCOPE: CPT

## 2023-10-03 PROCEDURE — 3700000001 HC ADD 15 MINUTES (ANESTHESIA): Performed by: UROLOGY

## 2023-10-03 PROCEDURE — 3600000013 HC SURGERY LEVEL 3 ADDTL 15MIN: Performed by: UROLOGY

## 2023-10-03 PROCEDURE — 6360000002 HC RX W HCPCS: Performed by: UROLOGY

## 2023-10-03 PROCEDURE — 6360000002 HC RX W HCPCS

## 2023-10-03 PROCEDURE — 2709999900 HC NON-CHARGEABLE SUPPLY: Performed by: UROLOGY

## 2023-10-03 PROCEDURE — 2580000003 HC RX 258: Performed by: ANESTHESIOLOGY

## 2023-10-03 PROCEDURE — 88305 TISSUE EXAM BY PATHOLOGIST: CPT

## 2023-10-03 PROCEDURE — 7100000010 HC PHASE II RECOVERY - FIRST 15 MIN: Performed by: UROLOGY

## 2023-10-03 PROCEDURE — 7100000001 HC PACU RECOVERY - ADDTL 15 MIN: Performed by: UROLOGY

## 2023-10-03 PROCEDURE — 6370000000 HC RX 637 (ALT 250 FOR IP): Performed by: ANESTHESIOLOGY

## 2023-10-03 PROCEDURE — 76872 US TRANSRECTAL: CPT | Performed by: UROLOGY

## 2023-10-03 PROCEDURE — 3600000003 HC SURGERY LEVEL 3 BASE: Performed by: UROLOGY

## 2023-10-03 PROCEDURE — 55700 PR PROSTATE NEEDLE BIOPSY ANY APPROACH: CPT | Performed by: UROLOGY

## 2023-10-03 PROCEDURE — 7100000011 HC PHASE II RECOVERY - ADDTL 15 MIN: Performed by: UROLOGY

## 2023-10-03 PROCEDURE — 2500000003 HC RX 250 WO HCPCS

## 2023-10-03 PROCEDURE — 2500000003 HC RX 250 WO HCPCS: Performed by: UROLOGY

## 2023-10-03 PROCEDURE — 3700000000 HC ANESTHESIA ATTENDED CARE: Performed by: UROLOGY

## 2023-10-03 RX ORDER — FENTANYL CITRATE 50 UG/ML
100 INJECTION, SOLUTION INTRAMUSCULAR; INTRAVENOUS
Status: DISCONTINUED | OUTPATIENT
Start: 2023-10-03 | End: 2023-10-03 | Stop reason: HOSPADM

## 2023-10-03 RX ORDER — SODIUM CHLORIDE, SODIUM LACTATE, POTASSIUM CHLORIDE, CALCIUM CHLORIDE 600; 310; 30; 20 MG/100ML; MG/100ML; MG/100ML; MG/100ML
INJECTION, SOLUTION INTRAVENOUS CONTINUOUS
Status: DISCONTINUED | OUTPATIENT
Start: 2023-10-03 | End: 2023-10-03 | Stop reason: HOSPADM

## 2023-10-03 RX ORDER — DEXTROSE MONOHYDRATE 100 MG/ML
INJECTION, SOLUTION INTRAVENOUS CONTINUOUS PRN
Status: DISCONTINUED | OUTPATIENT
Start: 2023-10-03 | End: 2023-10-03 | Stop reason: HOSPADM

## 2023-10-03 RX ORDER — MIDAZOLAM HYDROCHLORIDE 2 MG/2ML
2 INJECTION, SOLUTION INTRAMUSCULAR; INTRAVENOUS
Status: DISCONTINUED | OUTPATIENT
Start: 2023-10-03 | End: 2023-10-03 | Stop reason: HOSPADM

## 2023-10-03 RX ORDER — OXYCODONE HYDROCHLORIDE 5 MG/1
5 TABLET ORAL
Status: DISCONTINUED | OUTPATIENT
Start: 2023-10-03 | End: 2023-10-03 | Stop reason: HOSPADM

## 2023-10-03 RX ORDER — LIDOCAINE HYDROCHLORIDE 10 MG/ML
1 INJECTION, SOLUTION INFILTRATION; PERINEURAL
Status: DISCONTINUED | OUTPATIENT
Start: 2023-10-03 | End: 2023-10-03 | Stop reason: HOSPADM

## 2023-10-03 RX ORDER — SODIUM CHLORIDE 0.9 % (FLUSH) 0.9 %
5-40 SYRINGE (ML) INJECTION EVERY 12 HOURS SCHEDULED
Status: DISCONTINUED | OUTPATIENT
Start: 2023-10-03 | End: 2023-10-03 | Stop reason: HOSPADM

## 2023-10-03 RX ORDER — SODIUM CHLORIDE 9 MG/ML
INJECTION, SOLUTION INTRAVENOUS PRN
Status: DISCONTINUED | OUTPATIENT
Start: 2023-10-03 | End: 2023-10-03 | Stop reason: HOSPADM

## 2023-10-03 RX ORDER — PROPOFOL 10 MG/ML
INJECTION, EMULSION INTRAVENOUS PRN
Status: DISCONTINUED | OUTPATIENT
Start: 2023-10-03 | End: 2023-10-03 | Stop reason: SDUPTHER

## 2023-10-03 RX ORDER — SODIUM CHLORIDE 0.9 % (FLUSH) 0.9 %
5-40 SYRINGE (ML) INJECTION PRN
Status: DISCONTINUED | OUTPATIENT
Start: 2023-10-03 | End: 2023-10-03 | Stop reason: HOSPADM

## 2023-10-03 RX ORDER — DIPHENHYDRAMINE HYDROCHLORIDE 50 MG/ML
12.5 INJECTION INTRAMUSCULAR; INTRAVENOUS
Status: DISCONTINUED | OUTPATIENT
Start: 2023-10-03 | End: 2023-10-03 | Stop reason: HOSPADM

## 2023-10-03 RX ORDER — LIDOCAINE HYDROCHLORIDE 10 MG/ML
INJECTION, SOLUTION INFILTRATION; PERINEURAL PRN
Status: DISCONTINUED | OUTPATIENT
Start: 2023-10-03 | End: 2023-10-03 | Stop reason: HOSPADM

## 2023-10-03 RX ORDER — ACETAMINOPHEN 500 MG
1000 TABLET ORAL ONCE
Status: COMPLETED | OUTPATIENT
Start: 2023-10-03 | End: 2023-10-03

## 2023-10-03 RX ORDER — LIDOCAINE HYDROCHLORIDE 20 MG/ML
INJECTION, SOLUTION EPIDURAL; INFILTRATION; INTRACAUDAL; PERINEURAL PRN
Status: DISCONTINUED | OUTPATIENT
Start: 2023-10-03 | End: 2023-10-03 | Stop reason: SDUPTHER

## 2023-10-03 RX ORDER — KETAMINE HYDROCHLORIDE 50 MG/ML
INJECTION, SOLUTION, CONCENTRATE INTRAMUSCULAR; INTRAVENOUS PRN
Status: DISCONTINUED | OUTPATIENT
Start: 2023-10-03 | End: 2023-10-03 | Stop reason: SDUPTHER

## 2023-10-03 RX ORDER — BUPIVACAINE HYDROCHLORIDE 2.5 MG/ML
INJECTION, SOLUTION EPIDURAL; INFILTRATION; INTRACAUDAL PRN
Status: DISCONTINUED | OUTPATIENT
Start: 2023-10-03 | End: 2023-10-03 | Stop reason: HOSPADM

## 2023-10-03 RX ORDER — HYDROMORPHONE HYDROCHLORIDE 2 MG/ML
0.5 INJECTION, SOLUTION INTRAMUSCULAR; INTRAVENOUS; SUBCUTANEOUS EVERY 10 MIN PRN
Status: DISCONTINUED | OUTPATIENT
Start: 2023-10-03 | End: 2023-10-03 | Stop reason: HOSPADM

## 2023-10-03 RX ORDER — PROCHLORPERAZINE EDISYLATE 5 MG/ML
5 INJECTION INTRAMUSCULAR; INTRAVENOUS
Status: DISCONTINUED | OUTPATIENT
Start: 2023-10-03 | End: 2023-10-03 | Stop reason: HOSPADM

## 2023-10-03 RX ADMIN — KETAMINE HYDROCHLORIDE 20 MG: 50 INJECTION, SOLUTION INTRAMUSCULAR; INTRAVENOUS at 11:25

## 2023-10-03 RX ADMIN — PROPOFOL 50 MG: 10 INJECTION, EMULSION INTRAVENOUS at 11:38

## 2023-10-03 RX ADMIN — Medication 2000 MG: at 11:18

## 2023-10-03 RX ADMIN — LIDOCAINE HYDROCHLORIDE 40 MG: 20 INJECTION, SOLUTION EPIDURAL; INFILTRATION; INTRACAUDAL; PERINEURAL at 11:15

## 2023-10-03 RX ADMIN — ACETAMINOPHEN 1000 MG: 500 TABLET, FILM COATED ORAL at 09:56

## 2023-10-03 RX ADMIN — PROPOFOL 30 MG: 10 INJECTION, EMULSION INTRAVENOUS at 11:19

## 2023-10-03 RX ADMIN — PROPOFOL 30 MG: 10 INJECTION, EMULSION INTRAVENOUS at 11:29

## 2023-10-03 RX ADMIN — PROPOFOL 50 MG: 10 INJECTION, EMULSION INTRAVENOUS at 11:25

## 2023-10-03 RX ADMIN — SODIUM CHLORIDE, POTASSIUM CHLORIDE, SODIUM LACTATE AND CALCIUM CHLORIDE: 600; 310; 30; 20 INJECTION, SOLUTION INTRAVENOUS at 09:57

## 2023-10-03 RX ADMIN — PROPOFOL 50 MG: 10 INJECTION, EMULSION INTRAVENOUS at 11:15

## 2023-10-03 RX ADMIN — PROPOFOL 120 MCG/KG/MIN: 10 INJECTION, EMULSION INTRAVENOUS at 11:16

## 2023-10-03 ASSESSMENT — PAIN - FUNCTIONAL ASSESSMENT
PAIN_FUNCTIONAL_ASSESSMENT: NONE - DENIES PAIN
PAIN_FUNCTIONAL_ASSESSMENT: NONE - DENIES PAIN
PAIN_FUNCTIONAL_ASSESSMENT: 0-10
PAIN_FUNCTIONAL_ASSESSMENT: NONE - DENIES PAIN
PAIN_FUNCTIONAL_ASSESSMENT: NONE - DENIES PAIN

## 2023-10-03 ASSESSMENT — ENCOUNTER SYMPTOMS
GASTROINTESTINAL NEGATIVE: 1
RESPIRATORY NEGATIVE: 1

## 2023-10-03 NOTE — DISCHARGE INSTRUCTIONS
problems. It's also a good idea to keep a list of the medicines you take. Ask your doctor when you can expect to have your test results. After general anesthesia or intravenous sedation, for 24 hours or while taking prescription Narcotics:  Limit your activities  A responsible adult needs to be with you for the next 24 hours  Do not drive and operate hazardous machinery  Do not make important personal or business decisions  Do not drink alcoholic beverages  If you have not urinated within 8 hours after discharge, and you are experiencing discomfort from urinary retention, please go to the nearest ED. If you have sleep apnea and have a CPAP machine, please use it for all naps and sleeping. Please use caution when taking narcotics and any of your home medications that may cause drowsiness. *  Please give a list of your current medications to your Primary Care Provider. *  Please update this list whenever your medications are discontinued, doses are      changed, or new medications (including over-the-counter products) are added. *  Please carry medication information at all times in case of emergency situations. These are general instructions for a healthy lifestyle:  No smoking/ No tobacco products/ Avoid exposure to second hand smoke  Surgeon General's Warning:  Quitting smoking now greatly reduces serious risk to your health. Obesity, smoking, and sedentary lifestyle greatly increases your risk for illness  A healthy diet, regular physical exercise & weight monitoring are important for maintaining a healthy lifestyle    You may be retaining fluid if you have a history of heart failure or if you experience any of the following symptoms:  Weight gain of 3 pounds or more overnight or 5 pounds in a week, increased swelling in our hands or feet or shortness of breath while lying flat in bed. Please call your doctor as soon as you notice any of these symptoms; do not wait until your next office visit.

## 2023-10-03 NOTE — H&P
imaging. Last PSA: 5.8  Prostate Size: 4.5 x 4.3 x 3.1 cm with a calculated volume of  31.2 mL. PSA Density: 0.19 ng/mL    BPH: Mild cystic and nodular BPH. Hemorrhage: None. Peripheral Zone:No suspicious peripheral zone lesion. Scattered linear and  wedge-shaped T2 hypointensities suggesting sequelae of prior prostatitis and/or  fibrosis. Transition/Central Zone: Focal lesion described below. Lesion # 1  -Size and location: 1.0 x 0.7 cm T2 homogenous lesion within the anterior aspect  of the right transition zone at the mid gland (series 3 image 12). -DWI/ADC: Diffusion restriction. -DCE: The lesion demonstrates enhancement.  -Prostate Margin: No evidence of extracapsular extension. -PI-RADS Category: 4    Neurovascular Bundles: Intact. Seminal Vesicles: The seminal vesicles are unremarkable. Lymph Nodes: No appreciable pelvic lymphadenopathy. Other: Bilateral hip arthroplasties, with susceptibility artifact. - Impression -  1. PI-RADS 4 lesion within the anterior aspect right transition zone of the mid  gland, concerning for neoplasm. 2.  No pelvic lymphadenopathy. ASSESSMENT:    Mr. Kings Markham is a 72 y.o. male with a diagnosis of Elevated PSA. We discussed his MRI results in detail. We discussed options of moving forward with a prostate biopsy versus continued PSA observation. I explained the risks of a biopsy as outlined below. Ultimately I recommended we move forward with a transperineal MRI fusion biopsy of the prostate. They have a trip coming up next month and he would like to wait and do this at the end of September or early October. He will hold his Xarelto 2 days prior to the procedure. We reviewed with the patient the significance of an elevated PSA. We discussed benign etiologies such as; benign enlargement of the prostate, inflammation of the prostate, infections of the prostate, and prostatic manipulation as a possible cause.  We also

## 2023-10-03 NOTE — ANESTHESIA POSTPROCEDURE EVALUATION
Department of Anesthesiology  Postprocedure Note    Patient: Juan Langley  MRN: [id number]  YOB: 1950  Date of evaluation: 10/3/2023      Procedure Summary     Date: 10/03/23 Room / Location: [facility] MAIN OR  / [facility] MAIN OR    Anesthesia Start: 1107 Anesthesia Stop: 1156    Procedure: TRANSPERINEAL PROSTATE BIOPSY (Rectum) Diagnosis:       Elevated prostate specific antigen (PSA)      (Elevated prostate specific antigen (PSA) [R97.20])    Providers: Chloe Vásquez MD Responsible Provider: Sigifredo Saeed MD    Anesthesia Type: TIVA ASA Status: 3          Anesthesia Type: TIVA    Miguel Phase I: Miguel Score: 8    Miguel Phase II: Miguel Score: 10      Anesthesia Post Evaluation    Patient location during evaluation: PACU  Patient participation: complete - patient participated  Level of consciousness: awake and alert  Airway patency: patent  Nausea: well controlled. Complications: no  Cardiovascular status: acceptable.   Respiratory status: acceptable  Hydration status: stable  Pain management: adequate

## 2023-10-03 NOTE — PROGRESS NOTES
's pre-procedural visit requested by patient. Conveyed care and concern for Mr. Mary Krishna and his wife Orlando De Leon. I offered prayer as requested for patient, family, and staff.     Sarah Bentley MDiv, BS  Board Certified

## 2023-10-03 NOTE — OP NOTE
Postoperative Diagnosis:  same    Surgeon(s) and Role:     * Bird Diaz MD - Primary     Anesthesia:  MAC     Procedure: Procedure(s):  URONAV MRI Fusion Transperineal Ultrasound-guided prostate biopsy     Indications:     Discussed the risk of surgery including infection, hematoma, bleeding, urinary retention, pain, and the risks of anesthethesia. The patient understands the risks, any and all questions were answered to the patient's satisfaction and signed the consent for operation. URONAV MRI FUSED TRANSPERINEAL ULTRASOUND GUIDED BIOPSY OF THE PROSTATE    All risks, benefits and alternatives were again reviewed and he is willing to proceed at this time. The patient was placed in low lithotomy. Ancef was given as a prophylactic antibiotic. Chloroprep was used to prep the perineal area. I then inserted the transrectal ultrasound probe into the rectum. The prostate was visualized. The prostate appeared homogenous in appearance. Ultrasonographic sweep of the prostate was performed to obtain images to link to MRI via URONAV. There was one region of interest (mid TZ) based upon MRI imaging. 3 biopsies of regions of interest were then obtained using the ultrasound images for guidance that had been linked to the previous MRI using Uronav. I then performed 10 needle core biopsies using a standard transperineal biopsy format with traditional ultrasound images for guidance. The ultrasound probe was removed. The patient tolerated the procedure well. PROSTATE VOLUME:  27 cc    JOSE MANUEL: No induration or nodule     Estimated Blood Loss:  minimal    Specimens: prostate biopsies             Drains: none                 Implants: * No implants in log *    Sumit Roberson M.D.     [facility]  [address] [facility]       ID Type Source Tests Collected by Time Destination   A : INGRIS #1 TZ Tissue Prostate SURGICAL PATHOLOGY Sheila Guerra MD 10/3/2023 1110    B : Right posterior medial

## 2023-10-04 ENCOUNTER — TELEPHONE (OUTPATIENT)
Dept: ONCOLOGY | Age: 73
End: 2023-10-04

## 2023-10-04 NOTE — TELEPHONE ENCOUNTER
Called pt to check on him post-op. Pt states he is doing well, not experiencing any discomfort, and hematuria has resolved itself. Pt has no complaints and states he is on his way to [facility]. Pt also confirmed f/u appt on 10/23. Informed pt to call office or send message w/ any questions or concerns prior to appt.

## 2023-10-12 ENCOUNTER — OFFICE VISIT (OUTPATIENT)
Dept: ONCOLOGY | Age: 73
End: 2023-10-12
Payer: MEDICARE

## 2023-10-12 VITALS
DIASTOLIC BLOOD PRESSURE: 85 MMHG | TEMPERATURE: 97.9 F | WEIGHT: 171.8 LBS | HEIGHT: 70 IN | BODY MASS INDEX: 24.6 KG/M2 | OXYGEN SATURATION: 99 % | RESPIRATION RATE: 17 BRPM | HEART RATE: 68 BPM | SYSTOLIC BLOOD PRESSURE: 139 MMHG

## 2023-10-12 DIAGNOSIS — C61 PROSTATE CANCER (HCC): Primary | ICD-10-CM

## 2023-10-12 PROCEDURE — G8427 DOCREV CUR MEDS BY ELIG CLIN: HCPCS | Performed by: UROLOGY

## 2023-10-12 PROCEDURE — G8419 CALC BMI OUT NRM PARAM NOF/U: HCPCS | Performed by: UROLOGY

## 2023-10-12 PROCEDURE — 99215 OFFICE O/P EST HI 40 MIN: CPT | Performed by: UROLOGY

## 2023-10-12 PROCEDURE — 3079F DIAST BP 80-89 MM HG: CPT | Performed by: UROLOGY

## 2023-10-12 PROCEDURE — 1036F TOBACCO NON-USER: CPT | Performed by: UROLOGY

## 2023-10-12 PROCEDURE — 1123F ACP DISCUSS/DSCN MKR DOCD: CPT | Performed by: UROLOGY

## 2023-10-12 PROCEDURE — 3075F SYST BP GE 130 - 139MM HG: CPT | Performed by: UROLOGY

## 2023-10-12 PROCEDURE — 3017F COLORECTAL CA SCREEN DOC REV: CPT | Performed by: UROLOGY

## 2023-10-12 PROCEDURE — G8484 FLU IMMUNIZE NO ADMIN: HCPCS | Performed by: UROLOGY

## 2023-10-12 ASSESSMENT — PATIENT HEALTH QUESTIONNAIRE - PHQ9
SUM OF ALL RESPONSES TO PHQ9 QUESTIONS 1 & 2: 0
SUM OF ALL RESPONSES TO PHQ QUESTIONS 1-9: 0
1. LITTLE INTEREST OR PLEASURE IN DOING THINGS: 0
SUM OF ALL RESPONSES TO PHQ QUESTIONS 1-9: 0
2. FEELING DOWN, DEPRESSED OR HOPELESS: 0
SUM OF ALL RESPONSES TO PHQ QUESTIONS 1-9: 0
SUM OF ALL RESPONSES TO PHQ QUESTIONS 1-9: 0

## 2023-10-12 ASSESSMENT — ENCOUNTER SYMPTOMS
RESPIRATORY NEGATIVE: 1
GASTROINTESTINAL NEGATIVE: 1

## 2023-10-12 NOTE — PROGRESS NOTES
active surveillance inclusion criteria used by several major cancer centers. We also discussed possibly obtaining a genetic/molecular risk assessment testing in the future. The patient understands the risks associated with both active surveillance and definitive treatment and would like active surveillance at this point. PLAN:     -review pathology  -IPSS and DOTTIE today  -discussed tx options--wants AS  -RTC in 6 months w/ PSA prior  ________________________________________      I have seen and examined this patient. I have reviewed and edited the note started by the MA and agree with the outlined plan. Part of this note was written by using a voice dictation software. The note has been proof read but may still contain some grammatical/other typographical errors.       Merissa Hadley, [id number] [facility]

## 2023-10-12 NOTE — PATIENT INSTRUCTIONS
Patient Instructions from Today's Visit    Reason for Visit:  Follow up     Diagnosis Information:  [email or web address]    Plan:  -Your biopsy showed prostate cancer, but the first stage. There is also very low volume of cancer.  -You are a patient with low risk prostate cancer.  -The recommendation would be to do active surveillance and watch you over time.   -Treating prostate cancer comes with side effects regarding sexual and urological function.  -We will see you every six months for a PSA check. You can take the order for this with you and have it done a week or so prior to your next appointment. Follow Up:  Six months with Dr. Pooja Rodríguez    Recent Lab Results:  N/A    Treatment Summary has been discussed and given to patient: N/A        -------------------------------------------------------------------------------------------------------------------  Please call our office at [phone] if you have any  of the following symptoms:   Fever of 100.5 or greater  Chills  Shortness of breath  Swelling or pain in one leg    After office hours an answering service is available and will contact a provider for emergencies or if you are experiencing any of the above symptoms. Patient does express an interest in My Chart. My Chart log in information explained on the after visit summary printout at the [address]  desk.     Carmen Land MA

## 2023-11-06 RX ORDER — METOPROLOL SUCCINATE 25 MG/1
TABLET, EXTENDED RELEASE ORAL
Qty: 90 TABLET | Refills: 0 | Status: SHIPPED | OUTPATIENT
Start: 2023-11-06

## 2023-11-07 ENCOUNTER — TELEPHONE (OUTPATIENT)
Age: 73
End: 2023-11-07

## 2023-11-07 ENCOUNTER — OFFICE VISIT (OUTPATIENT)
Dept: ORTHOPEDIC SURGERY | Age: 73
End: 2023-11-07
Payer: MEDICARE

## 2023-11-07 DIAGNOSIS — G56.22 CUBITAL TUNNEL SYNDROME ON LEFT: ICD-10-CM

## 2023-11-07 DIAGNOSIS — G56.03 BILATERAL CARPAL TUNNEL SYNDROME: Primary | ICD-10-CM

## 2023-11-07 DIAGNOSIS — G56.22 GUYON SYNDROME, LEFT: ICD-10-CM

## 2023-11-07 DIAGNOSIS — G56.21 GUYON SYNDROME, RIGHT: ICD-10-CM

## 2023-11-07 DIAGNOSIS — G56.21 CUBITAL TUNNEL SYNDROME ON RIGHT: ICD-10-CM

## 2023-11-07 PROCEDURE — 3017F COLORECTAL CA SCREEN DOC REV: CPT | Performed by: ORTHOPAEDIC SURGERY

## 2023-11-07 PROCEDURE — G8419 CALC BMI OUT NRM PARAM NOF/U: HCPCS | Performed by: ORTHOPAEDIC SURGERY

## 2023-11-07 PROCEDURE — G8484 FLU IMMUNIZE NO ADMIN: HCPCS | Performed by: ORTHOPAEDIC SURGERY

## 2023-11-07 PROCEDURE — G8428 CUR MEDS NOT DOCUMENT: HCPCS | Performed by: ORTHOPAEDIC SURGERY

## 2023-11-07 PROCEDURE — 99214 OFFICE O/P EST MOD 30 MIN: CPT | Performed by: ORTHOPAEDIC SURGERY

## 2023-11-07 PROCEDURE — 1123F ACP DISCUSS/DSCN MKR DOCD: CPT | Performed by: ORTHOPAEDIC SURGERY

## 2023-11-07 PROCEDURE — 1036F TOBACCO NON-USER: CPT | Performed by: ORTHOPAEDIC SURGERY

## 2023-11-07 NOTE — TELEPHONE ENCOUNTER
Patient having Bilateral US guided cardpal tunnel release with Dr. Simeon Nevarez on 01/18/24under TIVA. Requesting Xarelto hold for 5 days prior.  Fax: [phone] ATTN: Radha Moreland

## 2023-11-07 NOTE — TELEPHONE ENCOUNTER
I have not seen him in 2 years. I need to see him prior to cardiac clearance.   Work him in sometime in the next month

## 2023-11-07 NOTE — PROGRESS NOTES
Electrodiagnostic Tests:     Previous nerve conduction study from 2 to 3 years ago was reviewed and independently interpreted this demonstrates mild bilateral carpal tunnel syndrome with sensory latency of 4.01 on the right and 3.85 on the left, evidence of ulnar neuropathy which is evidenced at the wrist level and the elbow level, there is non-recordable motor latency at the wrist and at the elbow, there is non-recordable sensory latency of the ulnar nerve at the wrist on the right side and elevated on the left side. Comparison study obtained this year demonstrates similar degree of carpal tunnel syndrome on both sides with sensory latency of 4.0 on both sides, interval improvement of motor conduction velocity across the elbow segment from not recordable on the right in 2019 to 28 m/s in 2023, 28 m/s on the left elbow in 2019 to 36 m/s in 2023    Assessment:   1. Bilateral carpal tunnel syndrome    2. 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 Cubital tunnel syndrome on left    4. Guyon syndrome, right    5. Guyon syndrome, left        Plan:   We discussed the diagnosis and different treatment options. We discussed observation, therapy, antiinflammatory medications and other pertinent treatment modalities. After discussing in detail the patient elects to proceed with bilateral ultrasound-guided carpal tunnel release, we discussed all treatment options in detail, we discussed performing open carpal tunnel release we could do Guyon's canal release at the same time with the patient would like to try ultrasound-guided carpal tunnel release given that it will allow him to have bilateral surgery at the same time and the recovery is much quicker.   He understands that if this does not give him full relief then we may perform Guyon's canal release later on however, Guyon's canal release surgery is less predictable and it certainly causes much more scar tenderness along the palm which can be long-lasting     Patient

## 2023-11-10 DIAGNOSIS — G56.03 BILATERAL CARPAL TUNNEL SYNDROME: Primary | ICD-10-CM

## 2023-11-27 RX ORDER — LOSARTAN POTASSIUM 50 MG/1
TABLET ORAL
Qty: 90 TABLET | Refills: 3 | Status: SHIPPED | OUTPATIENT
Start: 2023-11-27

## 2023-12-06 ENCOUNTER — APPOINTMENT (RX ONLY)
Dept: URBAN - METROPOLITAN AREA CLINIC 23 | Facility: CLINIC | Age: 73
Setting detail: DERMATOLOGY
End: 2023-12-06

## 2023-12-06 DIAGNOSIS — D485 NEOPLASM OF UNCERTAIN BEHAVIOR OF SKIN: ICD-10-CM

## 2023-12-06 DIAGNOSIS — L57.8 OTHER SKIN CHANGES DUE TO CHRONIC EXPOSURE TO NONIONIZING RADIATION: ICD-10-CM

## 2023-12-06 DIAGNOSIS — D22 MELANOCYTIC NEVI: ICD-10-CM

## 2023-12-06 DIAGNOSIS — Z85.828 PERSONAL HISTORY OF OTHER MALIGNANT NEOPLASM OF SKIN: ICD-10-CM

## 2023-12-06 DIAGNOSIS — L57.0 ACTINIC KERATOSIS: ICD-10-CM

## 2023-12-06 PROBLEM — D22.62 MELANOCYTIC NEVI OF LEFT UPPER LIMB, INCLUDING SHOULDER: Status: ACTIVE | Noted: 2023-12-06

## 2023-12-06 PROBLEM — D22.5 MELANOCYTIC NEVI OF TRUNK: Status: ACTIVE | Noted: 2023-12-06

## 2023-12-06 PROBLEM — D48.5 NEOPLASM OF UNCERTAIN BEHAVIOR OF SKIN: Status: ACTIVE | Noted: 2023-12-06

## 2023-12-06 PROBLEM — D22.61 MELANOCYTIC NEVI OF RIGHT UPPER LIMB, INCLUDING SHOULDER: Status: ACTIVE | Noted: 2023-12-06

## 2023-12-06 PROBLEM — D22.71 MELANOCYTIC NEVI OF RIGHT LOWER LIMB, INCLUDING HIP: Status: ACTIVE | Noted: 2023-12-06

## 2023-12-06 PROBLEM — D22.72 MELANOCYTIC NEVI OF LEFT LOWER LIMB, INCLUDING HIP: Status: ACTIVE | Noted: 2023-12-06

## 2023-12-06 PROCEDURE — ? BIOPSY BY SHAVE METHOD

## 2023-12-06 PROCEDURE — 11102 TANGNTL BX SKIN SINGLE LES: CPT

## 2023-12-06 PROCEDURE — 17003 DESTRUCT PREMALG LES 2-14: CPT

## 2023-12-06 PROCEDURE — 17000 DESTRUCT PREMALG LESION: CPT | Mod: 59

## 2023-12-06 PROCEDURE — ? COUNSELING

## 2023-12-06 PROCEDURE — 99213 OFFICE O/P EST LOW 20 MIN: CPT | Mod: 25

## 2023-12-06 PROCEDURE — ? LIQUID NITROGEN

## 2023-12-06 ASSESSMENT — LOCATION SIMPLE DESCRIPTION DERM
LOCATION SIMPLE: LEFT UPPER ARM
LOCATION SIMPLE: LEFT CHEEK
LOCATION SIMPLE: LEFT PRETIBIAL REGION
LOCATION SIMPLE: LEFT FOREARM
LOCATION SIMPLE: LEFT HAND
LOCATION SIMPLE: LEFT FOREHEAD
LOCATION SIMPLE: RIGHT FOREHEAD
LOCATION SIMPLE: RIGHT UPPER BACK
LOCATION SIMPLE: RIGHT FOREARM
LOCATION SIMPLE: LEFT TEMPLE
LOCATION SIMPLE: RIGHT UPPER ARM
LOCATION SIMPLE: RIGHT PRETIBIAL REGION
LOCATION SIMPLE: NOSE
LOCATION SIMPLE: SUPERIOR FOREHEAD
LOCATION SIMPLE: RIGHT TEMPLE

## 2023-12-06 ASSESSMENT — LOCATION DETAILED DESCRIPTION DERM
LOCATION DETAILED: LEFT PROXIMAL PRETIBIAL REGION
LOCATION DETAILED: LEFT PROXIMAL DORSAL FOREARM
LOCATION DETAILED: RIGHT MID-UPPER BACK
LOCATION DETAILED: LEFT SUPERIOR MEDIAL FOREHEAD
LOCATION DETAILED: RIGHT DISTAL PRETIBIAL REGION
LOCATION DETAILED: RIGHT DISTAL DORSAL FOREARM
LOCATION DETAILED: SUPERIOR MID FOREHEAD
LOCATION DETAILED: RIGHT DISTAL POSTERIOR UPPER ARM
LOCATION DETAILED: LEFT INFERIOR CENTRAL MALAR CHEEK
LOCATION DETAILED: RIGHT SUPERIOR FOREHEAD
LOCATION DETAILED: RIGHT LATERAL FOREHEAD
LOCATION DETAILED: RIGHT INFERIOR TEMPLE
LOCATION DETAILED: LEFT INFERIOR TEMPLE
LOCATION DETAILED: LEFT LATERAL FOREHEAD
LOCATION DETAILED: LEFT DISTAL DORSAL FOREARM
LOCATION DETAILED: LEFT INFERIOR LATERAL FOREHEAD
LOCATION DETAILED: LEFT DISTAL POSTERIOR UPPER ARM
LOCATION DETAILED: NASAL INFRATIP
LOCATION DETAILED: RIGHT PROXIMAL DORSAL FOREARM
LOCATION DETAILED: LEFT RADIAL DORSAL HAND

## 2023-12-06 ASSESSMENT — LOCATION ZONE DERM
LOCATION ZONE: FACE
LOCATION ZONE: NOSE
LOCATION ZONE: HAND
LOCATION ZONE: LEG
LOCATION ZONE: ARM
LOCATION ZONE: TRUNK

## 2023-12-06 NOTE — PROCEDURE: BIOPSY BY SHAVE METHOD
Detail Level: Detailed
Depth Of Biopsy: dermis
Was A Bandage Applied: Yes
Size Of Lesion In Cm: 0
Biopsy Type: H and E
Biopsy Method: Dermablade
Anesthesia Type: 1% lidocaine with epinephrine
Anesthesia Volume In Cc: 0.5
Hemostasis: Drysol
Wound Care: Petrolatum
Dressing: bandage
Destruction After The Procedure: No
Type Of Destruction Used: Curettage
Curettage Text: The wound bed was treated with curettage after the biopsy was performed.
Cryotherapy Text: The wound bed was treated with cryotherapy after the biopsy was performed.
Electrodesiccation Text: The wound bed was treated with electrodesiccation after the biopsy was performed.
Electrodesiccation And Curettage Text: The wound bed was treated with electrodesiccation and curettage after the biopsy was performed.
Silver Nitrate Text: The wound bed was treated with silver nitrate after the biopsy was performed.
Lab: [id number]
Lab Facility: [id number]
Consent: Written consent was obtained and risks were reviewed including but not limited to scarring, infection, bleeding, scabbing, incomplete removal, nerve damage and allergy to anesthesia.
Post-Care Instructions: I reviewed with the patient in detail post-care instructions. Patient is to keep the biopsy site dry overnight, and then apply bacitracin twice daily until healed. Patient may apply hydrogen peroxide soaks to remove any crusting.
Notification Instructions: Patient will be notified of biopsy results. However, patient instructed to call the office if not contacted within 2 weeks.
Billing Type: Third-Party Bill
Information: Selecting Yes will display possible errors in your note based on the variables you have selected. This validation is only offered as a suggestion for you. PLEASE NOTE THAT THE VALIDATION TEXT WILL BE REMOVED WHEN YOU FINALIZE YOUR NOTE. IF YOU WANT TO FAX A PRELIMINARY NOTE YOU WILL NEED TO TOGGLE THIS TO 'NO' IF YOU DO NOT WANT IT IN YOUR FAXED NOTE.

## 2023-12-14 NOTE — ACP (ADVANCE CARE PLANNING)
Advance Care Planning   Advance Care Planning Inpatient Note  [facility] Department    Today's Date: 5/11/2021  Unit: SFD 6 MED SURG    Received request from patient. Upon review of chart and communication with care team, patient's decision making abilities are not in question. Patient and Spouse was/were present in the room during visit. Goals of ACP Conversation:  Discuss Advance Care planning documents  Facilitate a discussion related to patient's goals of care as they align with the patient's values and beliefs    Health Care Decision Makers:      Click here to complete [address] including selection of the Healthcare Decision Maker Relationship (ie \"Primary\")     Today we:  Documented Next of Kin, per patient report    Advance Care Planning Documents (Patient Wishes) on file:  None     Assessment:      met with patient and spouse at bedside, went over [facility] power of  paperwork, answered questions, reviewed next of kin hierarchy. Patient's spouse Raymond Pantoja is current medical decision maker. Left a copies of the form with patient and spouse. Patient is unsure if he wishes to complete a HCPOA during this hospitalization, but will call if he wishes to do so.  are available to assist further as needed.     Interventions:  Provided education on documents for clarity and greater understanding  Discussed and provided education on state decision maker hierarchy  Encouraged ongoing ACP conversation with future decision makers and loved ones  Reviewed but did not complete ACP document     Outcomes/Plan:  Original Advance Directive form and copies given to patient     Electronically signed by Chaplain Svetlana on 5/11/2021 at 5:21 PM
Additional Notes: Patient is currently using Maddy’s Choice Retinol. Discussed La Roche Posay Wash, moisturizer and Vitamin C. Discussed Retinol products.
Detail Level: Simple
Render Risk Assessment In Note?: no

## 2024-01-10 ASSESSMENT — ENCOUNTER SYMPTOMS
SHORTNESS OF BREATH: 0
CONSTIPATION: 0
DIARRHEA: 0
COUGH: 0
ABDOMINAL DISTENTION: 0
ABDOMINAL PAIN: 0
PHOTOPHOBIA: 0
SORE THROAT: 0

## 2024-01-10 NOTE — PROGRESS NOTES
[facility] CARDIOLOGY  [address] [place] [address], [address]  [place] [address]  PHONE: [phone]        NAME:  Chon Dozier  : 1950  MRN: [id number]     PCP:  Kathe Turk MD      SUBJECTIVE:   Chon Dozier is a 73 y.o. male seen for a follow up visit regarding the following:     Chief Complaint   Patient presents with    Hypertension       HPI:  He is s/p CVA on 10/23/18 with probable cardioembolic phenomena affecting the left posterior parietal lobe.  He had acute paralysis of his right arm while shaving, with dizziness, mild gait instability at the time, but no other neurologic symptoms. Symptoms  gradually resolved but MRI positive for multiple left posterior parietal strokes.  There was no ICH and carotid duplex showed <50% ICA stenosis bilaterally.       Prior to these acute symptoms, he was without interval angina, CHF, palpitations, edema, presyncope or syncope.  Vitals controlled and tolerating meds well. BP usually 120-130/80's at home (wife, a nurse, checks BP).  Very active prior to CVA without  any significant limitations or exertional symptoms. He denies any symptoms of palpitations or pAF whatsoever since (or prior to) the CVA, but he does have a brother who has had 3 AF ablation in the past....        Diagnosed with a wide-complex tachycardia which was hypothesized to be atrial flutter with aberrancy, recurrent.  Subsequently had atrial flutter ablation in 2021 with Dr. James and doing well since then without interval angina, CHF, palpitations,  edema, presyncope or syncope.  Vitals controlled and tolerating meds well. Staying active without any significant limitations.         PRIYANKA showed no ASD/PFO/endocarditis or MARIELENA clot but with possible very small intrapulmonary shunt (see report). S/p LINQ loop recorder, but no interrogation since October, re-establishing care today.       He had severe joint discomfort with statin use, resolved with

## 2024-01-12 ENCOUNTER — OFFICE VISIT (OUTPATIENT)
Age: 74
End: 2024-01-12

## 2024-01-12 VITALS
WEIGHT: 171 LBS | BODY MASS INDEX: 24.48 KG/M2 | HEART RATE: 69 BPM | DIASTOLIC BLOOD PRESSURE: 70 MMHG | HEIGHT: 70 IN | SYSTOLIC BLOOD PRESSURE: 124 MMHG

## 2024-01-12 DIAGNOSIS — I10 PRIMARY HYPERTENSION: Primary | ICD-10-CM

## 2024-01-12 DIAGNOSIS — Z79.01 CHRONIC ANTICOAGULATION: ICD-10-CM

## 2024-01-12 DIAGNOSIS — I69.30 HISTORY OF STROKE WITH RESIDUAL DEFICIT: ICD-10-CM

## 2024-01-12 DIAGNOSIS — Z87.891 HISTORY OF TOBACCO USE: ICD-10-CM

## 2024-01-12 DIAGNOSIS — E78.00 HYPERCHOLESTEREMIA: ICD-10-CM

## 2024-01-16 NOTE — PERIOP NOTE
Patient verified name and .  Order for consent NOT found in EHR at time of PAT visit. Unable to verify case posting against order; surgery verified by patient.    Type 1a surgery, PAT phone assessment complete.  Orders not received.  Labs per surgeon: unknown; no orders received    Labs per anesthesia protocol: none indicated    Patient answered medical/surgical history questions at their best of ability. All prior to admission medications documented in EPIC.  Patient instructed to take the following medications the day of surgery according to anesthesia guidelines with a small sip of water: none  Hold all vitamins 7 days prior to surgery and NSAIDS 5 days prior to surgery. Prescription meds to hold:vitamins and supplements, tadalafil. Patient to be advised on whether or not to hold anticoagulant by the surgeon.   Patient instructed on the following:    > Arrive at OPC Entrance, time of arrival to be called the day before by 1700  > NPO after midnight, unless otherwise indicated, including gum, mints, and ice chips  > Responsible adult must drive patient to the hospital, stay during surgery, and patient will need supervision 24 hours after anesthesia  > Use non moisturizing soap in shower the night before surgery and on the morning of surgery  > All piercings must be removed prior to arrival.    > Leave all valuables (money and jewelry) at home but bring insurance card and ID on DOS.   > You may be required to pay a deductible or co-pay on the day of your procedure. You can pre-pay by calling [phone] if your surgery is at the [facility] or [phone] if your surgery is at the [facility].  > Do not wear make-up, nail polish, lotions, cologne, perfumes, powders, or oil on skin. Artificial nails are not permitted.

## 2024-01-17 ENCOUNTER — ANESTHESIA EVENT (OUTPATIENT)
Dept: SURGERY | Age: 74
End: 2024-01-17
Payer: MEDICARE

## 2024-01-17 DIAGNOSIS — G56.03 BILATERAL CARPAL TUNNEL SYNDROME: Primary | ICD-10-CM

## 2024-01-17 NOTE — PERIOP NOTE
Preop department called to notify patient of arrival time for scheduled procedure. Instructions given to   - Arrive at OPC Entrance [address].  - Remain NPO after midnight, unless otherwise indicated, including gum, mints, and ice chips.   - Have a responsible adult to drive patient to the hospital, stay during surgery, and patient will need supervision 24 hours after anesthesia.   - Use antibacterial soap in shower the night before surgery and on the morning of surgery.       Was patient contacted: Yes  Voicemail left:   Numbers contacted: [phone]   Arrival time: 0530

## 2024-01-18 ENCOUNTER — ANESTHESIA (OUTPATIENT)
Dept: SURGERY | Age: 74
End: 2024-01-18
Payer: MEDICARE

## 2024-01-18 ENCOUNTER — HOSPITAL ENCOUNTER (OUTPATIENT)
Age: 74
Setting detail: OUTPATIENT SURGERY
Discharge: HOME | End: 2024-01-18
Attending: ORTHOPAEDIC SURGERY | Admitting: ORTHOPAEDIC SURGERY
Payer: MEDICARE

## 2024-01-18 VITALS
DIASTOLIC BLOOD PRESSURE: 76 MMHG | WEIGHT: 171 LBS | HEIGHT: 71 IN | TEMPERATURE: 98 F | BODY MASS INDEX: 23.94 KG/M2 | SYSTOLIC BLOOD PRESSURE: 143 MMHG | HEART RATE: 68 BPM | OXYGEN SATURATION: 94 % | RESPIRATION RATE: 23 BRPM

## 2024-01-18 PROCEDURE — 6360000002 HC RX W HCPCS: Performed by: ORTHOPAEDIC SURGERY

## 2024-01-18 PROCEDURE — 7100000000 HC PACU RECOVERY - FIRST 15 MIN: Performed by: ORTHOPAEDIC SURGERY

## 2024-01-18 PROCEDURE — 2709999900 HC NON-CHARGEABLE SUPPLY: Performed by: ORTHOPAEDIC SURGERY

## 2024-01-18 PROCEDURE — 6360000002 HC RX W HCPCS: Performed by: NURSE PRACTITIONER

## 2024-01-18 PROCEDURE — 2500000003 HC RX 250 WO HCPCS: Performed by: ORTHOPAEDIC SURGERY

## 2024-01-18 PROCEDURE — 3700000000 HC ANESTHESIA ATTENDED CARE: Performed by: ORTHOPAEDIC SURGERY

## 2024-01-18 PROCEDURE — 6370000000 HC RX 637 (ALT 250 FOR IP): Performed by: ANESTHESIOLOGY

## 2024-01-18 PROCEDURE — 7100000001 HC PACU RECOVERY - ADDTL 15 MIN: Performed by: ORTHOPAEDIC SURGERY

## 2024-01-18 PROCEDURE — 2500000003 HC RX 250 WO HCPCS: Performed by: NURSE ANESTHETIST, CERTIFIED REGISTERED

## 2024-01-18 PROCEDURE — 3600000002 HC SURGERY LEVEL 2 BASE: Performed by: ORTHOPAEDIC SURGERY

## 2024-01-18 PROCEDURE — 6360000002 HC RX W HCPCS: Performed by: NURSE ANESTHETIST, CERTIFIED REGISTERED

## 2024-01-18 PROCEDURE — 3700000001 HC ADD 15 MINUTES (ANESTHESIA): Performed by: ORTHOPAEDIC SURGERY

## 2024-01-18 PROCEDURE — 2720000010 HC SURG SUPPLY STERILE: Performed by: ORTHOPAEDIC SURGERY

## 2024-01-18 PROCEDURE — 3600000012 HC SURGERY LEVEL 2 ADDTL 15MIN: Performed by: ORTHOPAEDIC SURGERY

## 2024-01-18 PROCEDURE — 2580000003 HC RX 258: Performed by: ANESTHESIOLOGY

## 2024-01-18 PROCEDURE — 7100000010 HC PHASE II RECOVERY - FIRST 15 MIN: Performed by: ORTHOPAEDIC SURGERY

## 2024-01-18 RX ORDER — PROPOFOL 10 MG/ML
INJECTION, EMULSION INTRAVENOUS PRN
Status: DISCONTINUED | OUTPATIENT
Start: 2024-01-18 | End: 2024-01-18 | Stop reason: SDUPTHER

## 2024-01-18 RX ORDER — SODIUM CHLORIDE, SODIUM LACTATE, POTASSIUM CHLORIDE, CALCIUM CHLORIDE 600; 310; 30; 20 MG/100ML; MG/100ML; MG/100ML; MG/100ML
INJECTION, SOLUTION INTRAVENOUS CONTINUOUS
Status: DISCONTINUED | OUTPATIENT
Start: 2024-01-18 | End: 2024-01-18 | Stop reason: HOSPADM

## 2024-01-18 RX ORDER — FENTANYL CITRATE 50 UG/ML
100 INJECTION, SOLUTION INTRAMUSCULAR; INTRAVENOUS
Status: DISCONTINUED | OUTPATIENT
Start: 2024-01-18 | End: 2024-01-18 | Stop reason: HOSPADM

## 2024-01-18 RX ORDER — IPRATROPIUM BROMIDE AND ALBUTEROL SULFATE 2.5; .5 MG/3ML; MG/3ML
1 SOLUTION RESPIRATORY (INHALATION)
Status: DISCONTINUED | OUTPATIENT
Start: 2024-01-18 | End: 2024-01-18 | Stop reason: HOSPADM

## 2024-01-18 RX ORDER — ACETAMINOPHEN 500 MG
1000 TABLET ORAL ONCE
Status: COMPLETED | OUTPATIENT
Start: 2024-01-18 | End: 2024-01-18

## 2024-01-18 RX ORDER — OXYCODONE HYDROCHLORIDE 5 MG/1
5 TABLET ORAL
Status: DISCONTINUED | OUTPATIENT
Start: 2024-01-18 | End: 2024-01-18 | Stop reason: HOSPADM

## 2024-01-18 RX ORDER — BUPIVACAINE HYDROCHLORIDE 2.5 MG/ML
INJECTION, SOLUTION EPIDURAL; INFILTRATION; INTRACAUDAL PRN
Status: DISCONTINUED | OUTPATIENT
Start: 2024-01-18 | End: 2024-01-18 | Stop reason: ALTCHOICE

## 2024-01-18 RX ORDER — TRAMADOL HYDROCHLORIDE 50 MG/1
50 TABLET ORAL EVERY 6 HOURS PRN
Qty: 20 TABLET | Refills: 0 | Status: SHIPPED | OUTPATIENT
Start: 2024-01-18 | End: 2024-01-23

## 2024-01-18 RX ORDER — SODIUM CHLORIDE 0.9 % (FLUSH) 0.9 %
5-40 SYRINGE (ML) INJECTION EVERY 12 HOURS SCHEDULED
Status: DISCONTINUED | OUTPATIENT
Start: 2024-01-18 | End: 2024-01-18 | Stop reason: HOSPADM

## 2024-01-18 RX ORDER — LIDOCAINE HYDROCHLORIDE 10 MG/ML
INJECTION, SOLUTION INFILTRATION; PERINEURAL PRN
Status: DISCONTINUED | OUTPATIENT
Start: 2024-01-18 | End: 2024-01-18 | Stop reason: ALTCHOICE

## 2024-01-18 RX ORDER — SODIUM CHLORIDE 9 MG/ML
INJECTION, SOLUTION INTRAVENOUS PRN
Status: DISCONTINUED | OUTPATIENT
Start: 2024-01-18 | End: 2024-01-18 | Stop reason: HOSPADM

## 2024-01-18 RX ORDER — SODIUM CHLORIDE 0.9 % (FLUSH) 0.9 %
5-40 SYRINGE (ML) INJECTION PRN
Status: DISCONTINUED | OUTPATIENT
Start: 2024-01-18 | End: 2024-01-18 | Stop reason: HOSPADM

## 2024-01-18 RX ORDER — HALOPERIDOL 5 MG/ML
1 INJECTION INTRAMUSCULAR
Status: DISCONTINUED | OUTPATIENT
Start: 2024-01-18 | End: 2024-01-18 | Stop reason: HOSPADM

## 2024-01-18 RX ORDER — HYDROMORPHONE HYDROCHLORIDE 2 MG/ML
0.5 INJECTION, SOLUTION INTRAMUSCULAR; INTRAVENOUS; SUBCUTANEOUS EVERY 5 MIN PRN
Status: DISCONTINUED | OUTPATIENT
Start: 2024-01-18 | End: 2024-01-18 | Stop reason: HOSPADM

## 2024-01-18 RX ORDER — MIDAZOLAM HYDROCHLORIDE 2 MG/2ML
2 INJECTION, SOLUTION INTRAMUSCULAR; INTRAVENOUS
Status: DISCONTINUED | OUTPATIENT
Start: 2024-01-18 | End: 2024-01-18 | Stop reason: HOSPADM

## 2024-01-18 RX ORDER — LIDOCAINE HYDROCHLORIDE 10 MG/ML
1 INJECTION, SOLUTION INFILTRATION; PERINEURAL
Status: DISCONTINUED | OUTPATIENT
Start: 2024-01-18 | End: 2024-01-18 | Stop reason: HOSPADM

## 2024-01-18 RX ORDER — LIDOCAINE HYDROCHLORIDE 20 MG/ML
INJECTION, SOLUTION EPIDURAL; INFILTRATION; INTRACAUDAL; PERINEURAL PRN
Status: DISCONTINUED | OUTPATIENT
Start: 2024-01-18 | End: 2024-01-18 | Stop reason: SDUPTHER

## 2024-01-18 RX ORDER — PROCHLORPERAZINE EDISYLATE 5 MG/ML
5 INJECTION INTRAMUSCULAR; INTRAVENOUS
Status: DISCONTINUED | OUTPATIENT
Start: 2024-01-18 | End: 2024-01-18 | Stop reason: HOSPADM

## 2024-01-18 RX ADMIN — PROPOFOL 140 MCG/KG/MIN: 10 INJECTION, EMULSION INTRAVENOUS at 07:07

## 2024-01-18 RX ADMIN — PROPOFOL 50 MG: 10 INJECTION, EMULSION INTRAVENOUS at 07:06

## 2024-01-18 RX ADMIN — PROPOFOL 50 MG: 10 INJECTION, EMULSION INTRAVENOUS at 07:08

## 2024-01-18 RX ADMIN — SODIUM CHLORIDE, POTASSIUM CHLORIDE, SODIUM LACTATE AND CALCIUM CHLORIDE: 600; 310; 30; 20 INJECTION, SOLUTION INTRAVENOUS at 05:49

## 2024-01-18 RX ADMIN — PROPOFOL 30 MG: 10 INJECTION, EMULSION INTRAVENOUS at 07:22

## 2024-01-18 RX ADMIN — LIDOCAINE HYDROCHLORIDE 50 MG: 20 INJECTION, SOLUTION EPIDURAL; INFILTRATION; INTRACAUDAL; PERINEURAL at 07:06

## 2024-01-18 RX ADMIN — PROPOFOL 50 MG: 10 INJECTION, EMULSION INTRAVENOUS at 07:11

## 2024-01-18 RX ADMIN — Medication 2000 MG: at 07:02

## 2024-01-18 RX ADMIN — ACETAMINOPHEN 1000 MG: 500 TABLET ORAL at 05:49

## 2024-01-18 NOTE — DISCHARGE INSTRUCTIONS
Postoperative  Instructions:      Weightbearing or Lifting:  You may resume activities as tolerated, limit activities depending on pain level    Dressing  instructions:    Keep  your  dressing  clean  and  dry  at  all  times.    You  can  remove  your  dressing  on  post-operative  day  #3  and  leave uncovered  thereafter.          Showering  Instructions:  May  shower  but keep surgical dressing clean and dry until removed as explained above. After dressing is removed, you may allow soapy water to run through the incision during showers but do not scrub. After each shower pat dry. Do  not  soak  your  Incision in still water or bathtub  for  2  weeks  after  surgery.    If  the  incision  gets  wet otherwise,  pat  dry  and  do  not  scrub  the  incision.  Do  not  apply  cream  or  lotion  to  incision      Pain  Control:  - You  have  been  given  a  prescription  to  be  taken  as  directed  for  post-operative  pain  control.    In  addition,  elevate  the  operative  extremity  above  the  heart  at  all  times  to  prevent  swelling  and  throbbing  pain.   - If you develop constipation while taking narcotic pain medications (Norco, Hydrocodone, Percocet, Oxycodone, Dilaudid, Hydromorphone) take  over-the-counter  Colace,  100mg  by  mouth  twice  a  Day.     - Nausea  is  a  common  side  effect  of  many  pain  medications.  You  will  want  to  eat something  before  taking  your  pain  medicine  to  help  prevent  Nausea.  - If  you  are  taking  a  prescription  pain  medication  that  contains  acetaminophen,  we  recommend  that  you  do  not  take  additional  over  the  counter  acetaminophen  (Tylenol®).      Other  pain  relieving  options:   - Using  a  cold  pack  to  ice  the  affected  area  a  few  times  a  day  (15  to  20  minutes  at  a  time)  can  help  to  relieve  pain,  reduce  swelling  and  bruising.      - Elevation  of  the  affected  area  can  also  help  to  reduce  pain  and

## 2024-01-18 NOTE — ANESTHESIA PRE PROCEDURE
index is 23.85 kg/m².    CBC:   Lab Results   Component Value Date/Time    WBC 4.8 11/10/2021 09:13 AM    RBC 5.12 11/10/2021 09:13 AM    HGB 16.7 11/10/2021 09:13 AM    HCT 49.8 11/10/2021 09:13 AM    MCV 97 11/10/2021 09:13 AM    RDW 12.3 11/10/2021 09:13 AM     11/10/2021 09:13 AM       CMP:   Lab Results   Component Value Date/Time     05/25/2023 09:06 AM    K 4.3 05/25/2023 09:06 AM     05/25/2023 09:06 AM    CO2 27 05/25/2023 09:06 AM    BUN 13 05/25/2023 09:06 AM    CREATININE 0.70 05/25/2023 09:06 AM    GFRAA 111 11/10/2021 09:13 AM    AGRATIO 1.8 05/11/2022 08:50 AM    LABGLOM >60 05/25/2023 09:06 AM    LABGLOM 97 05/11/2022 08:50 AM    GLUCOSE 95 05/25/2023 09:06 AM    PROT 7.1 05/25/2023 09:06 AM    CALCIUM 10.0 05/25/2023 09:06 AM    BILITOT 0.5 05/25/2023 09:06 AM    ALKPHOS 83 05/25/2023 09:06 AM    ALKPHOS 84 05/11/2022 08:50 AM    AST 30 05/25/2023 09:06 AM    ALT 34 05/25/2023 09:06 AM       POC Tests: No results for input(s): \"POCGLU\", \"POCNA\", \"POCK\", \"POCCL\", \"POCBUN\", \"POCHEMO\", \"POCHCT\" in the last 72 hours.    Coags:   Lab Results   Component Value Date/Time    PROTIME 15.6 05/10/2021 02:01 PM    INR 1.2 05/10/2021 02:01 PM    APTT 31.1 03/17/2021 10:21 AM       HCG (If Applicable): No results found for: \"PREGTESTUR\", \"PREGSERUM\", \"HCG\", \"HCGQUANT\"     ABGs: No results found for: \"PHART\", \"PO2ART\", \"OLD0SRY\", \"TGQ3ZOG\", \"BEART\", \"U7IFQHCT\"     Type & Screen (If Applicable):  No results found for: \"LABABO\", \"LABRH\"    Drug/Infectious Status (If Applicable):  Lab Results   Component Value Date/Time    HEPCAB 0.1 10/31/2017 07:54 AM       COVID-19 Screening (If Applicable):   Lab Results   Component Value Date/Time    COVID19 Not Detected 03/17/2021 09:31 AM           Anesthesia Evaluation  Patient summary reviewed and Nursing notes reviewed   history of anesthetic complications (h/o elevated LFTs after surgery 20 yrs ago? - no issues since):   Airway: Mallampati: II  TM

## 2024-01-18 NOTE — H&P
History and Physical    Subjective:     Chon Dozier is a 73 y.o. scheduled for bilateral US guided carpal tunnel release.    Past Medical History:   Diagnosis Date    Actinic keratosis     Adverse effect of anesthesia     liver enzymes increased for 1 week following general anesthesia for shoulder sx    Anxiety     Atrial flutter (HCC)     s/p ablation 21    CVA (cerebral vascular accident) (HCC) 10/23/2018    numbness remains in R hand    Erectile dysfunction     Former smoker     Quit 2018--- 1 ppd x 40 yrs    Hypertension     managed with med    Osteoarthritis     feet--- surg for correction    Pain in joint involving pelvic region and thigh     Prostate cancer (HCC)     Rosacea     Wide-complex tachycardia 2021    pt has ILR in place and is followed by Dr James ([facility])       Past Surgical History:   Procedure Laterality Date    ABLATION OF DYSRHYTHMIC FOCUS  2021    pt has loop recorder    ANESTH,SURGERY OF SHOULDER      COLONOSCOPY      ORTHOPEDIC SURGERY Right 2018    RIGHT FOOT 1ST MTP CHEILECTOMY     ORTHOPEDIC SURGERY Left 2019    elbow surg    PROSTATE BIOPSY N/A 10/03/2023    TRANSPERINEAL PROSTATE BIOPSY performed by Rickey Diaz MD at [facility] MAIN OR    ROTATOR CUFF REPAIR Left     SHOULDER ARTHROSCOPY Right     bicep    TOTAL HIP ARTHROPLASTY Right 2020    TOTAL HIP ARTHROPLASTY Left      Social History     Tobacco Use    Smoking status: Former     Current packs/day: 0.00     Average packs/day: 1 pack/day for 10.0 years (10.0 ttl pk-yrs)     Types: Cigarettes     Start date: 2008     Quit date: 2018     Years since quittin.9     Passive exposure: Never    Smokeless tobacco: Never   Substance Use Topics    Alcohol use: Yes     Alcohol/week: 2.0 standard drinks of alcohol     Comment: occasional       No Known Allergies     Review of Systems:  A comprehensive review of systems was negative except for that written in the

## 2024-01-18 NOTE — PROGRESS NOTES
visited patient in pre op, as requested.  Wife was at bedside and supportive.  Patient expressed optimistic outlook on his procedure and recovery.   provided pastoral presence, prayer, and empathetic listening.  Peace be with you,  Signed by  PEEWEE BritoDiv.   [phone]

## 2024-01-18 NOTE — ANESTHESIA POSTPROCEDURE EVALUATION
Department of Anesthesiology  Postprocedure Note    Patient: Chon Dozier  MRN: [id number]  YOB: 1950  Date of evaluation: 1/18/2024    Procedure Summary       Date: 01/18/24 Room / Location: [facility] OP OR  / D OPC    Anesthesia Start: 0658 Anesthesia Stop: 0735    Procedure: CARPAL TUNNEL RELEASE bilateral ultra sound guided (Bilateral: Wrist) Diagnosis:       Bilateral carpal tunnel syndrome      (Bilateral carpal tunnel syndrome [G56.03])    Surgeons: Haroldo Thayer MD Responsible Provider: Carlos Wells MD    Anesthesia Type: TIVA ASA Status: 3            Anesthesia Type: TIVA    Miguel Phase I: Miguel Score: 8    Miguel Phase II: Miguel Score: 10    Anesthesia Post Evaluation    Patient location during evaluation: PACU  Patient participation: complete - patient participated  Level of consciousness: awake and alert  Airway patency: patent  Nausea & Vomiting: no nausea  Cardiovascular status: hemodynamically stable  Respiratory status: acceptable and nonlabored ventilation  Hydration status: stable  Multimodal analgesia pain management approach  Pain management: adequate    No notable events documented.

## 2024-01-18 NOTE — OP NOTE
fluid was manually expressed from the wound and the wound closed with Steri-Strips. The wound was dressed with gauze and compressive wrapping. There were no immediate complications.    All ultrasound images were stored     Disposition: To PACU with no complications and follow up per routine.  Patient is instructed to remove dressings in five days and other precautions include avoidance of heavy and repetitive lifting for 2 weeks, when an appointment for follow up and suture removal will take place.     Haroldo Thayer MD  01/18/24  7:35 AM

## 2024-01-30 ENCOUNTER — OFFICE VISIT (OUTPATIENT)
Dept: ORTHOPEDIC SURGERY | Age: 74
End: 2024-01-30

## 2024-01-30 DIAGNOSIS — G56.03 BILATERAL CARPAL TUNNEL SYNDROME: Primary | ICD-10-CM

## 2024-01-30 PROCEDURE — 99024 POSTOP FOLLOW-UP VISIT: CPT | Performed by: NURSE PRACTITIONER

## 2024-01-30 NOTE — PROGRESS NOTES
Orthopaedic Hand Surgery Note    Name: Chon Dozier  YOB: 1950  Gender: male  MRN: [id number]    HPI: Patient is status post CARPAL TUNNEL RELEASE bilateral ultra sound guided - Bilateral on 1/18/2024.  Patient is doing very well after surgery.  Night symptoms have resolved.  Patient still has paresthesia but improved.  No fevers or chills.    Physical Examination:  Wound healing well.  There is no erythema or drainage.  Good finger and wrist range of motion. Sensation is improved from preoperative.  Patient is able to make a composite fist.    Assessment:   1. Bilateral carpal tunnel syndrome        Status post CARPAL TUNNEL RELEASE bilateral ultra sound guided - Bilateral on 1/18/2024    Plan:  Patient can progress to activities as tolerated.  He has had an excellent result and is very happy and appreciative.  We will see him back in 2 months if he is having any issues.  I have included home exercises in his after visit summary.    Ly Hidalgo NP  Orthopaedic Surgery  01/30/24  10:24 AM

## 2024-02-05 RX ORDER — METOPROLOL SUCCINATE 25 MG/1
TABLET, EXTENDED RELEASE ORAL
Qty: 90 TABLET | Refills: 3 | Status: SHIPPED | OUTPATIENT
Start: 2024-02-05

## 2024-02-05 NOTE — TELEPHONE ENCOUNTER
Verified rx in last OV date 01/12/24. Pharmacy confirmed. Erx as requested.    Requested Prescriptions     Pending Prescriptions Disp Refills    metoprolol succinate (TOPROL XL) 25 MG extended release tablet [Pharmacy Med Name: METOPROLOL SUCCINATE ER TABS 25MG] 90 tablet 3     Sig: TAKE 1 TABLET DAILY (NEEDS TO CALL FOR AN APPOINTMENT BEFORE REFILLS ARE GIVEN)

## 2024-03-18 DIAGNOSIS — E55.9 VITAMIN D DEFICIENCY: ICD-10-CM

## 2024-03-18 RX ORDER — ERGOCALCIFEROL 1.25 MG/1
CAPSULE ORAL
Qty: 6 CAPSULE | Refills: 3 | OUTPATIENT
Start: 2024-03-18

## 2024-04-16 ENCOUNTER — OFFICE VISIT (OUTPATIENT)
Dept: ENDOCRINOLOGY | Age: 74
End: 2024-04-16
Payer: MEDICARE

## 2024-04-16 ENCOUNTER — OFFICE VISIT (OUTPATIENT)
Dept: INTERNAL MEDICINE CLINIC | Facility: CLINIC | Age: 74
End: 2024-04-16
Payer: MEDICARE

## 2024-04-16 VITALS
WEIGHT: 171 LBS | SYSTOLIC BLOOD PRESSURE: 130 MMHG | HEART RATE: 82 BPM | DIASTOLIC BLOOD PRESSURE: 75 MMHG | RESPIRATION RATE: 18 BRPM | BODY MASS INDEX: 23.94 KG/M2 | HEIGHT: 71 IN

## 2024-04-16 VITALS
DIASTOLIC BLOOD PRESSURE: 64 MMHG | HEART RATE: 78 BPM | BODY MASS INDEX: 24.27 KG/M2 | WEIGHT: 174 LBS | SYSTOLIC BLOOD PRESSURE: 110 MMHG | OXYGEN SATURATION: 98 %

## 2024-04-16 DIAGNOSIS — M85.80 OSTEOPENIA, UNSPECIFIED LOCATION: ICD-10-CM

## 2024-04-16 DIAGNOSIS — E55.9 VITAMIN D DEFICIENCY: ICD-10-CM

## 2024-04-16 DIAGNOSIS — E21.0 PRIMARY HYPERPARATHYROIDISM (HCC): ICD-10-CM

## 2024-04-16 DIAGNOSIS — I69.30 HISTORY OF STROKE WITH RESIDUAL DEFICIT: ICD-10-CM

## 2024-04-16 DIAGNOSIS — C61 MALIGNANT NEOPLASM OF PROSTATE (HCC): ICD-10-CM

## 2024-04-16 DIAGNOSIS — H00.011 HORDEOLUM EXTERNUM OF RIGHT UPPER EYELID: ICD-10-CM

## 2024-04-16 DIAGNOSIS — I10 PRIMARY HYPERTENSION: ICD-10-CM

## 2024-04-16 DIAGNOSIS — E78.00 HYPERCHOLESTEREMIA: ICD-10-CM

## 2024-04-16 DIAGNOSIS — C61 MALIGNANT NEOPLASM OF PROSTATE (HCC): Primary | ICD-10-CM

## 2024-04-16 DIAGNOSIS — E78.00 HYPERCHOLESTEREMIA: Primary | ICD-10-CM

## 2024-04-16 DIAGNOSIS — E21.0 PRIMARY HYPERPARATHYROIDISM (HCC): Primary | ICD-10-CM

## 2024-04-16 DIAGNOSIS — L71.9 ROSACEA: ICD-10-CM

## 2024-04-16 DIAGNOSIS — M15.9 PRIMARY OSTEOARTHRITIS INVOLVING MULTIPLE JOINTS: ICD-10-CM

## 2024-04-16 LAB
25(OH)D3 SERPL-MCNC: 55.2 NG/ML (ref 30–100)
ALBUMIN SERPL-MCNC: 4.2 G/DL (ref 3.2–4.6)
ALBUMIN/GLOB SERPL: 1.3 (ref 0.4–1.6)
ALP SERPL-CCNC: 83 U/L (ref 50–136)
ALT SERPL-CCNC: 28 U/L (ref 12–65)
ANION GAP SERPL CALC-SCNC: 5 MMOL/L (ref 2–11)
AST SERPL-CCNC: 34 U/L (ref 15–37)
BASOPHILS # BLD: 0 K/UL (ref 0–0.2)
BASOPHILS NFR BLD: 1 % (ref 0–2)
BILIRUB SERPL-MCNC: 0.5 MG/DL (ref 0.2–1.1)
BUN SERPL-MCNC: 11 MG/DL (ref 8–23)
CALCIUM SERPL-MCNC: 10.1 MG/DL (ref 8.3–10.4)
CALCIUM SERPL-MCNC: 10.1 MG/DL (ref 8.3–10.4)
CHLORIDE SERPL-SCNC: 108 MMOL/L (ref 103–113)
CHOLEST SERPL-MCNC: 183 MG/DL
CO2 SERPL-SCNC: 28 MMOL/L (ref 21–32)
CREAT SERPL-MCNC: 0.8 MG/DL (ref 0.8–1.5)
DIFFERENTIAL METHOD BLD: ABNORMAL
EOSINOPHIL # BLD: 0.2 K/UL (ref 0–0.8)
EOSINOPHIL NFR BLD: 4 % (ref 0.5–7.8)
ERYTHROCYTE [DISTWIDTH] IN BLOOD BY AUTOMATED COUNT: 13.7 % (ref 11.9–14.6)
GLOBULIN SER CALC-MCNC: 3.3 G/DL (ref 2.8–4.5)
GLUCOSE SERPL-MCNC: 92 MG/DL (ref 65–100)
HCT VFR BLD AUTO: 46.4 % (ref 41.1–50.3)
HDLC SERPL-MCNC: 95 MG/DL (ref 40–60)
HDLC SERPL: 1.9
HGB BLD-MCNC: 15.3 G/DL (ref 13.6–17.2)
IMM GRANULOCYTES # BLD AUTO: 0 K/UL (ref 0–0.5)
IMM GRANULOCYTES NFR BLD AUTO: 0 % (ref 0–5)
LDLC SERPL CALC-MCNC: 71.6 MG/DL
LYMPHOCYTES # BLD: 1.1 K/UL (ref 0.5–4.6)
LYMPHOCYTES NFR BLD: 22 % (ref 13–44)
MCH RBC QN AUTO: 31.7 PG (ref 26.1–32.9)
MCHC RBC AUTO-ENTMCNC: 33 G/DL (ref 31.4–35)
MCV RBC AUTO: 96.3 FL (ref 82–102)
MONOCYTES # BLD: 0.7 K/UL (ref 0.1–1.3)
MONOCYTES NFR BLD: 14 % (ref 4–12)
NEUTS SEG # BLD: 2.9 K/UL (ref 1.7–8.2)
NEUTS SEG NFR BLD: 59 % (ref 43–78)
NRBC # BLD: 0 K/UL (ref 0–0.2)
PHOSPHATE SERPL-MCNC: 2.9 MG/DL (ref 2.3–3.7)
PLATELET # BLD AUTO: 228 K/UL (ref 150–450)
PMV BLD AUTO: 10.3 FL (ref 9.4–12.3)
POTASSIUM SERPL-SCNC: 4.8 MMOL/L (ref 3.5–5.1)
PROT SERPL-MCNC: 7.5 G/DL (ref 6.3–8.2)
PSA SERPL-MCNC: 6.6 NG/ML
PTH-INTACT SERPL-MCNC: 112 PG/ML (ref 18.5–88)
RBC # BLD AUTO: 4.82 M/UL (ref 4.23–5.6)
SODIUM SERPL-SCNC: 141 MMOL/L (ref 136–146)
TRIGL SERPL-MCNC: 82 MG/DL (ref 35–150)
TSH W FREE THYROID IF ABNORMAL: 2.45 UIU/ML (ref 0.36–3.74)
VLDLC SERPL CALC-MCNC: 16.4 MG/DL (ref 6–23)
WBC # BLD AUTO: 4.8 K/UL (ref 4.3–11.1)

## 2024-04-16 PROCEDURE — 3074F SYST BP LT 130 MM HG: CPT | Performed by: INTERNAL MEDICINE

## 2024-04-16 PROCEDURE — G2211 COMPLEX E/M VISIT ADD ON: HCPCS | Performed by: INTERNAL MEDICINE

## 2024-04-16 PROCEDURE — 99214 OFFICE O/P EST MOD 30 MIN: CPT | Performed by: INTERNAL MEDICINE

## 2024-04-16 PROCEDURE — 1123F ACP DISCUSS/DSCN MKR DOCD: CPT | Performed by: INTERNAL MEDICINE

## 2024-04-16 PROCEDURE — 3078F DIAST BP <80 MM HG: CPT | Performed by: INTERNAL MEDICINE

## 2024-04-16 PROCEDURE — G8427 DOCREV CUR MEDS BY ELIG CLIN: HCPCS | Performed by: INTERNAL MEDICINE

## 2024-04-16 PROCEDURE — 1036F TOBACCO NON-USER: CPT | Performed by: INTERNAL MEDICINE

## 2024-04-16 PROCEDURE — 3075F SYST BP GE 130 - 139MM HG: CPT | Performed by: INTERNAL MEDICINE

## 2024-04-16 PROCEDURE — G8420 CALC BMI NORM PARAMETERS: HCPCS | Performed by: INTERNAL MEDICINE

## 2024-04-16 PROCEDURE — 3017F COLORECTAL CA SCREEN DOC REV: CPT | Performed by: INTERNAL MEDICINE

## 2024-04-16 RX ORDER — METRONIDAZOLE 7.5 MG/G
GEL TOPICAL
Qty: 45 G | Status: SHIPPED | OUTPATIENT
Start: 2024-04-16

## 2024-04-16 RX ORDER — ERYTHROMYCIN 5 MG/G
OINTMENT OPHTHALMIC
Qty: 3.5 G | Refills: 1 | Status: SHIPPED | OUTPATIENT
Start: 2024-04-16 | End: 2024-04-26

## 2024-04-16 ASSESSMENT — ENCOUNTER SYMPTOMS
VOMITING: 0
SHORTNESS OF BREATH: 0
SHORTNESS OF BREATH: 0
CONSTIPATION: 0
COUGH: 0
DIARRHEA: 0
NAUSEA: 0
WHEEZING: 0

## 2024-04-16 ASSESSMENT — PATIENT HEALTH QUESTIONNAIRE - PHQ9
2. FEELING DOWN, DEPRESSED OR HOPELESS: NOT AT ALL
SUM OF ALL RESPONSES TO PHQ QUESTIONS 1-9: 0
1. LITTLE INTEREST OR PLEASURE IN DOING THINGS: NOT AT ALL
SUM OF ALL RESPONSES TO PHQ9 QUESTIONS 1 & 2: 0
SUM OF ALL RESPONSES TO PHQ QUESTIONS 1-9: 0

## 2024-04-16 NOTE — PROGRESS NOTES
78.9 kg (174 lb)   04/16/24 77.6 kg (171 lb)   01/18/24 77.6 kg (171 lb)       Physical Exam  Constitutional:       General: He is not in acute distress.  Neck:      Thyroid: No thyroid mass or thyromegaly.   Cardiovascular:      Rate and Rhythm: Normal rate and regular rhythm.   Lymphadenopathy:      Cervical: No cervical adenopathy.   Neurological:      Motor: No tremor.           Orders Placed This Encounter   Procedures    DEXA BONE DENSITY AXIAL SKELETON     Standing Status:   Future     Standing Expiration Date:   4/16/2025     Scheduling Instructions:      Schedule in 11/2024     Order Specific Question:   Reason for exam:     Answer:   Please include forearm    PTH, Intact     Standing Status:   Future     Standing Expiration Date:   4/16/2025    Phosphorus     Standing Status:   Future     Standing Expiration Date:   4/16/2025         Current Outpatient Medications   Medication Sig Dispense Refill    metroNIDAZOLE (METROGEL) 0.75 % gel Apply topically 2 times daily. 45 g PRN    erythromycin (ROMYCIN) 5 MG/GM ophthalmic ointment Use twice daily on right eyelid 3.5 g 1    metoprolol succinate (TOPROL XL) 25 MG extended release tablet TAKE 1 TABLET DAILY (NEEDS TO CALL FOR AN APPOINTMENT BEFORE REFILLS ARE GIVEN) 90 tablet 3    rivaroxaban (XARELTO) 20 MG TABS tablet Take 1 tablet by mouth      losartan (COZAAR) 50 MG tablet TAKE 1 TABLET DAILY WITH DINNER FOR HIGH BLOOD PRESSURE (Patient taking differently: 1 tablet) 90 tablet 3    vitamin D (ERGOCALCIFEROL) 1.25 MG (62547 UT) CAPS capsule TAKE ON DAY 1 AND DAY 15 EVERY MONTH 6 capsule 1    tadalafil (CIALIS) 20 MG tablet Take 1 tablet by mouth as needed for Erectile Dysfunction 18 tablet 5    melatonin 3 MG TABS tablet Take 1 tablet by mouth nightly as needed       No current facility-administered medications for this visit.

## 2024-04-16 NOTE — PROGRESS NOTES
2024 9:18 AM  Location:[facility] PHYSICIAN SERVICES  [facility] MEDICINE  [place]  Patient #:  [id number]  YOB: 1950            History of Present Illness     Chief Complaint   Patient presents with    Annual Exam     Patient here for his yearly exam.     Other     Pt requesting a PSA level - this was ordered by Dr. Rickey Tirado    Skin Exam     ? Rosacea - has a redness on his race - requesting a cream for his face.     Eye Problem     Complains with a spot on his right eye lid.        Mr. Dozier is a 74 y.o. male  who presents for the above.   Has had a stye that won't heal up.  Expressed something out of it and it just won't heal.    Eye Problem   Associated symptoms include weakness (improved in hands). Pertinent negatives include no nausea or vomiting.          No Known Allergies  Past Medical History:   Diagnosis Date    Actinic keratosis     Adverse effect of anesthesia     liver enzymes increased for 1 week following general anesthesia for shoulder sx    Anxiety     Atrial flutter (HCC)     s/p ablation 21    CVA (cerebral vascular accident) (HCC) 10/23/2018    numbness remains in R hand    Erectile dysfunction     Former smoker     Quit 2018--- 1 ppd x 40 yrs    Hypertension     managed with med    Osteoarthritis     feet--- surg for correction    Pain in joint involving pelvic region and thigh     Prostate cancer (HCC)     Rosacea     Wide-complex tachycardia 2021    pt has ILR in place and is followed by Dr James ([facility] cardio)      Social History     Socioeconomic History    Marital status:      Spouse name: None    Number of children: None    Years of education: None    Highest education level: None   Tobacco Use    Smoking status: Former     Current packs/day: 0.00     Average packs/day: 1 pack/day for 10.0 years (10.0 ttl pk-yrs)     Types: Cigarettes     Start date: 2008     Quit date: 2018     Years since quittin.1     Passive

## 2024-04-17 NOTE — RESULT ENCOUNTER NOTE
Labs are stable except that your PSA is a little more elevated.  I am forwarding this onto your urologist.  Continue your current doses of medications. Keep up the good work.  Thanks.  [facility]

## 2024-04-22 ENCOUNTER — OFFICE VISIT (OUTPATIENT)
Dept: ONCOLOGY | Age: 74
End: 2024-04-22
Payer: MEDICARE

## 2024-04-22 VITALS
BODY MASS INDEX: 24.45 KG/M2 | DIASTOLIC BLOOD PRESSURE: 85 MMHG | OXYGEN SATURATION: 100 % | TEMPERATURE: 98.1 F | SYSTOLIC BLOOD PRESSURE: 144 MMHG | HEART RATE: 65 BPM | RESPIRATION RATE: 16 BRPM | WEIGHT: 175.3 LBS

## 2024-04-22 DIAGNOSIS — C61 PROSTATE CANCER (HCC): Primary | ICD-10-CM

## 2024-04-22 PROCEDURE — 1036F TOBACCO NON-USER: CPT | Performed by: UROLOGY

## 2024-04-22 PROCEDURE — 3077F SYST BP >= 140 MM HG: CPT | Performed by: UROLOGY

## 2024-04-22 PROCEDURE — 99213 OFFICE O/P EST LOW 20 MIN: CPT | Performed by: UROLOGY

## 2024-04-22 PROCEDURE — 1123F ACP DISCUSS/DSCN MKR DOCD: CPT | Performed by: UROLOGY

## 2024-04-22 PROCEDURE — 3079F DIAST BP 80-89 MM HG: CPT | Performed by: UROLOGY

## 2024-04-22 PROCEDURE — G8427 DOCREV CUR MEDS BY ELIG CLIN: HCPCS | Performed by: UROLOGY

## 2024-04-22 PROCEDURE — G8420 CALC BMI NORM PARAMETERS: HCPCS | Performed by: UROLOGY

## 2024-04-22 PROCEDURE — 3017F COLORECTAL CA SCREEN DOC REV: CPT | Performed by: UROLOGY

## 2024-04-22 ASSESSMENT — PATIENT HEALTH QUESTIONNAIRE - PHQ9
SUM OF ALL RESPONSES TO PHQ QUESTIONS 1-9: 0
SUM OF ALL RESPONSES TO PHQ QUESTIONS 1-9: 0
10. IF YOU CHECKED OFF ANY PROBLEMS, HOW DIFFICULT HAVE THESE PROBLEMS MADE IT FOR YOU TO DO YOUR WORK, TAKE CARE OF THINGS AT HOME, OR GET ALONG WITH OTHER PEOPLE: NOT DIFFICULT AT ALL
4. FEELING TIRED OR HAVING LITTLE ENERGY: NOT AT ALL
6. FEELING BAD ABOUT YOURSELF - OR THAT YOU ARE A FAILURE OR HAVE LET YOURSELF OR YOUR FAMILY DOWN: NOT AT ALL
1. LITTLE INTEREST OR PLEASURE IN DOING THINGS: NOT AT ALL
5. POOR APPETITE OR OVEREATING: NOT AT ALL
2. FEELING DOWN, DEPRESSED OR HOPELESS: NOT AT ALL
SUM OF ALL RESPONSES TO PHQ QUESTIONS 1-9: 0
SUM OF ALL RESPONSES TO PHQ9 QUESTIONS 1 & 2: 0
9. THOUGHTS THAT YOU WOULD BE BETTER OFF DEAD, OR OF HURTING YOURSELF: NOT AT ALL
SUM OF ALL RESPONSES TO PHQ QUESTIONS 1-9: 0
3. TROUBLE FALLING OR STAYING ASLEEP: NOT AT ALL
8. MOVING OR SPEAKING SO SLOWLY THAT OTHER PEOPLE COULD HAVE NOTICED. OR THE OPPOSITE, BEING SO FIGETY OR RESTLESS THAT YOU HAVE BEEN MOVING AROUND A LOT MORE THAN USUAL: NOT AT ALL
7. TROUBLE CONCENTRATING ON THINGS, SUCH AS READING THE NEWSPAPER OR WATCHING TELEVISION: NOT AT ALL

## 2024-04-22 ASSESSMENT — ANXIETY QUESTIONNAIRES
2. NOT BEING ABLE TO STOP OR CONTROL WORRYING: NOT AT ALL
3. WORRYING TOO MUCH ABOUT DIFFERENT THINGS: NOT AT ALL
IF YOU CHECKED OFF ANY PROBLEMS ON THIS QUESTIONNAIRE, HOW DIFFICULT HAVE THESE PROBLEMS MADE IT FOR YOU TO DO YOUR WORK, TAKE CARE OF THINGS AT HOME, OR GET ALONG WITH OTHER PEOPLE: NOT DIFFICULT AT ALL
6. BECOMING EASILY ANNOYED OR IRRITABLE: NOT AT ALL
1. FEELING NERVOUS, ANXIOUS, OR ON EDGE: NOT AT ALL
7. FEELING AFRAID AS IF SOMETHING AWFUL MIGHT HAPPEN: NOT AT ALL
4. TROUBLE RELAXING: NOT AT ALL
5. BEING SO RESTLESS THAT IT IS HARD TO SIT STILL: NOT AT ALL
GAD7 TOTAL SCORE: 0

## 2024-04-22 ASSESSMENT — ENCOUNTER SYMPTOMS
GASTROINTESTINAL NEGATIVE: 1
RESPIRATORY NEGATIVE: 1

## 2024-04-22 NOTE — RESULT ENCOUNTER NOTE
Labs are stable except that your PSA is a little more elevated.  I am forwarding this onto your urologist.  Continue your current doses of medications. Keep up the good work.  Thanks.  [facility]

## 2024-04-22 NOTE — PATIENT INSTRUCTIONS
Patient Information from Today's Visit        Treatment Summary has been discussed and given to patient:N/A    -Your recent lab work is not concerning according to Dr. Diaz.  -We will continue to monitor you routinely. You will have an appointment in six months with lab work, a digital rectal exam, and discuss repeating your MRI at that time.      Follow Up: Six months with Dr. Diaz    Please refer to After Visit Summary or [employer] for upcoming appointment information. If you have any questions regarding your upcoming schedule please reach out to your care team through [employer] or call [phone].          -------------------------------------------------------------------------------------------------------------------  Please call our office at [phone] if you have any  of the following symptoms:   Fever of 100.5 or greater  Chills  Shortness of breath  Swelling or pain in one leg    After office hours an answering service is available and will contact a provider for emergencies or if you are experiencing any of the above symptoms.    Patient does express an interest in My Chart.  My Chart log in information explained on the after visit summary printout at the check-out desk.    Rosamaria Hassan MA

## 2024-04-22 NOTE — PROGRESS NOTES
likely repeat his MRI at the following 6-month appointment.      PLAN:     -psa on 4/16  -discussed repeat MRI in future  -RTC in 6 months w/ psa prior and JOSE MANUEL  ________________________________________      I have seen and examined this patient.  I have reviewed and edited the note started by the MA and agree with the outlined plan.  Part of this note was written by using a voice dictation software. The note has been proof read but may still contain some grammatical/other typographical errors.      Bird Diza MD  Urologic Oncology  [facility]

## 2024-04-23 ENCOUNTER — TELEPHONE (OUTPATIENT)
Dept: INTERNAL MEDICINE CLINIC | Facility: CLINIC | Age: 74
End: 2024-04-23

## 2024-04-23 NOTE — TELEPHONE ENCOUNTER
----- Message from Kathe Turk MD sent at 4/22/2024  6:51 PM EDT -----    Labs are stable except that your PSA is a little more elevated.  I am forwarding this onto your urologist.  Continue your current doses of medications. Keep up the good work.  Thanks.  [facility]

## 2024-06-06 ENCOUNTER — APPOINTMENT (RX ONLY)
Dept: URBAN - METROPOLITAN AREA CLINIC 23 | Facility: CLINIC | Age: 74
Setting detail: DERMATOLOGY
End: 2024-06-06

## 2024-06-06 DIAGNOSIS — L71.8 OTHER ROSACEA: ICD-10-CM | Status: INADEQUATELY CONTROLLED

## 2024-06-06 DIAGNOSIS — Z85.828 PERSONAL HISTORY OF OTHER MALIGNANT NEOPLASM OF SKIN: ICD-10-CM

## 2024-06-06 DIAGNOSIS — L57.8 OTHER SKIN CHANGES DUE TO CHRONIC EXPOSURE TO NONIONIZING RADIATION: ICD-10-CM

## 2024-06-06 DIAGNOSIS — D22 MELANOCYTIC NEVI: ICD-10-CM

## 2024-06-06 PROBLEM — D22.62 MELANOCYTIC NEVI OF LEFT UPPER LIMB, INCLUDING SHOULDER: Status: ACTIVE | Noted: 2024-06-06

## 2024-06-06 PROBLEM — D22.71 MELANOCYTIC NEVI OF RIGHT LOWER LIMB, INCLUDING HIP: Status: ACTIVE | Noted: 2024-06-06

## 2024-06-06 PROBLEM — D22.61 MELANOCYTIC NEVI OF RIGHT UPPER LIMB, INCLUDING SHOULDER: Status: ACTIVE | Noted: 2024-06-06

## 2024-06-06 PROBLEM — D22.72 MELANOCYTIC NEVI OF LEFT LOWER LIMB, INCLUDING HIP: Status: ACTIVE | Noted: 2024-06-06

## 2024-06-06 PROBLEM — D22.5 MELANOCYTIC NEVI OF TRUNK: Status: ACTIVE | Noted: 2024-06-06

## 2024-06-06 PROCEDURE — 99214 OFFICE O/P EST MOD 30 MIN: CPT

## 2024-06-06 PROCEDURE — ? COUNSELING

## 2024-06-06 PROCEDURE — ? PRESCRIPTION

## 2024-06-06 RX ORDER — PHARMACY COMPOUNDING ACCESSORY
EACH MISCELLANEOUS
Qty: 30 | Refills: 0 | Status: ERX | COMMUNITY
Start: 2024-06-06

## 2024-06-06 RX ADMIN — Medication: at 00:00

## 2024-06-06 ASSESSMENT — LOCATION DETAILED DESCRIPTION DERM
LOCATION DETAILED: RIGHT CENTRAL MALAR CHEEK
LOCATION DETAILED: LEFT INFERIOR CENTRAL MALAR CHEEK
LOCATION DETAILED: LEFT PROXIMAL PRETIBIAL REGION
LOCATION DETAILED: RIGHT MID-UPPER BACK
LOCATION DETAILED: RIGHT DISTAL POSTERIOR UPPER ARM
LOCATION DETAILED: LEFT CENTRAL MALAR CHEEK
LOCATION DETAILED: RIGHT PROXIMAL DORSAL FOREARM
LOCATION DETAILED: RIGHT DISTAL PRETIBIAL REGION
LOCATION DETAILED: LEFT DISTAL POSTERIOR UPPER ARM
LOCATION DETAILED: SUPERIOR MID FOREHEAD
LOCATION DETAILED: LEFT PROXIMAL DORSAL FOREARM
LOCATION DETAILED: LEFT DISTAL DORSAL FOREARM

## 2024-06-06 ASSESSMENT — LOCATION ZONE DERM
LOCATION ZONE: TRUNK
LOCATION ZONE: LEG
LOCATION ZONE: FACE
LOCATION ZONE: ARM

## 2024-06-06 ASSESSMENT — LOCATION SIMPLE DESCRIPTION DERM
LOCATION SIMPLE: RIGHT FOREARM
LOCATION SIMPLE: LEFT CHEEK
LOCATION SIMPLE: RIGHT UPPER BACK
LOCATION SIMPLE: RIGHT UPPER ARM
LOCATION SIMPLE: LEFT FOREARM
LOCATION SIMPLE: RIGHT PRETIBIAL REGION
LOCATION SIMPLE: SUPERIOR FOREHEAD
LOCATION SIMPLE: RIGHT CHEEK
LOCATION SIMPLE: LEFT PRETIBIAL REGION
LOCATION SIMPLE: LEFT UPPER ARM

## 2024-06-11 ENCOUNTER — TELEPHONE (OUTPATIENT)
Age: 74
End: 2024-06-11

## 2024-06-11 NOTE — TELEPHONE ENCOUNTER
Requested Prescriptions     Pending Prescriptions Disp Refills    rivaroxaban (XARELTO) 20 MG TABS tablet 90 tablet 3     Sig: Take 1 tablet by mouth daily    rivaroxaban (XARELTO) 20 MG TABS tablet 14 tablet 0     Sig: Take 1 tablet by mouth daily (with breakfast)         Verified rx. Last OV 01/12/24. Erx to pharm on file.

## 2024-06-11 NOTE — TELEPHONE ENCOUNTER
Pt needs refill of Xarelto. Only has 6 tablets. They are putting him on auto refill needs to know if we can give an RX for 2 weeks worth and call into:    [facility] - [place]LE[place] Fung - [address] [phone] - F [phone]

## 2024-07-16 RX ORDER — TADALAFIL 20 MG/1
20 TABLET ORAL PRN
Qty: 18 TABLET | Refills: 5 | Status: SHIPPED | OUTPATIENT
Start: 2024-07-16

## 2024-07-31 ENCOUNTER — PATIENT MESSAGE (OUTPATIENT)
Dept: ENDOCRINOLOGY | Age: 74
End: 2024-07-31

## 2024-07-31 DIAGNOSIS — E55.9 VITAMIN D DEFICIENCY: ICD-10-CM

## 2024-07-31 RX ORDER — ERGOCALCIFEROL 1.25 MG/1
CAPSULE ORAL
Qty: 6 CAPSULE | Refills: 3 | Status: SHIPPED | OUTPATIENT
Start: 2024-07-31

## 2024-11-14 NOTE — PROGRESS NOTES
Urologic Oncology  [facility]ematology & Oncology  [address] [place] [id number]  [phone]        Mr. Chon Dozier is a 74 y.o. male with a diagnosis of GS 3+3=6 PCa diagnosed on transperineal prostate biopsy 10/3/23. cT1c, 4 of 13 cores. On AS    INTERVAL HISTORY:    Patient returns today for follow up evaluation of GS 3+3=6 prostate cancer on AS.     He is without acute complaints today. No LUTs. No dysuria or hematuria. No new areas of pain.    PSA today is 6.1. This was previously 6.6 on 4/16/24.     He remains on his Xarelto for history of CVA several years ago.    From previous note (4/22/24):  Patient is here today for follow-up of his prostate cancer. He is currently on active surveillance. He has Elizabeth 3+3 equal 6 and 4 cores, clinical stage T1c. His gland measured 27 cc. His PSA on 4/16/2023 was 6.6. Previously it was 5.3 before that was 5.8. He remains on his Xarelto. He has no complaints today denies any lower urinary tract symptoms.     Past medical, family and social histories, as well as medications and allergies, were reviewed and updated in the medical record as appropriate.    PMH:     Past Medical History:   Diagnosis Date    Actinic keratosis     Adverse effect of anesthesia     liver enzymes increased for 1 week following general anesthesia for shoulder sx    Anxiety     Atrial flutter (HCC)     s/p ablation 1/7/21    CVA (cerebral vascular accident) (HCC) 10/23/2018    numbness remains in R hand    Erectile dysfunction     Former smoker     Quit 2/2018--- 1 ppd x 40 yrs    Hypertension     managed with med    Osteoarthritis     feet--- surg for correction    Pain in joint involving pelvic region and thigh     Prostate cancer (HCC)     Rosacea     Wide-complex tachycardia 01/2021    pt has ILR in place and is followed by Dr Jaems ([facility])        MEDs:     losartan  melatonin Tabs  metoprolol succinate  metroNIDAZOLE  rivaroxaban Tabs  tadalafil  vitamin

## 2024-11-18 ENCOUNTER — OFFICE VISIT (OUTPATIENT)
Dept: ONCOLOGY | Age: 74
End: 2024-11-18
Payer: MEDICARE

## 2024-11-18 ENCOUNTER — HOSPITAL ENCOUNTER (OUTPATIENT)
Dept: LAB | Age: 74
Discharge: HOME | End: 2024-11-18
Payer: MEDICARE

## 2024-11-18 VITALS
HEIGHT: 71 IN | BODY MASS INDEX: 24.51 KG/M2 | TEMPERATURE: 98 F | OXYGEN SATURATION: 99 % | SYSTOLIC BLOOD PRESSURE: 141 MMHG | DIASTOLIC BLOOD PRESSURE: 78 MMHG | WEIGHT: 175.1 LBS | HEART RATE: 67 BPM | RESPIRATION RATE: 16 BRPM

## 2024-11-18 DIAGNOSIS — C61 PROSTATE CANCER (HCC): ICD-10-CM

## 2024-11-18 DIAGNOSIS — C61 PROSTATE CANCER (HCC): Primary | ICD-10-CM

## 2024-11-18 LAB — PSA SERPL-MCNC: 6.1 NG/ML (ref 0–4)

## 2024-11-18 PROCEDURE — 84153 ASSAY OF PSA TOTAL: CPT

## 2024-11-18 PROCEDURE — 99213 OFFICE O/P EST LOW 20 MIN: CPT | Performed by: PHYSICIAN ASSISTANT

## 2024-11-18 PROCEDURE — G8427 DOCREV CUR MEDS BY ELIG CLIN: HCPCS | Performed by: PHYSICIAN ASSISTANT

## 2024-11-18 PROCEDURE — 1159F MED LIST DOCD IN RCRD: CPT | Performed by: PHYSICIAN ASSISTANT

## 2024-11-18 PROCEDURE — 1036F TOBACCO NON-USER: CPT | Performed by: PHYSICIAN ASSISTANT

## 2024-11-18 PROCEDURE — G8420 CALC BMI NORM PARAMETERS: HCPCS | Performed by: PHYSICIAN ASSISTANT

## 2024-11-18 PROCEDURE — 36415 COLL VENOUS BLD VENIPUNCTURE: CPT

## 2024-11-18 PROCEDURE — 1160F RVW MEDS BY RX/DR IN RCRD: CPT | Performed by: PHYSICIAN ASSISTANT

## 2024-11-18 PROCEDURE — 3078F DIAST BP <80 MM HG: CPT | Performed by: PHYSICIAN ASSISTANT

## 2024-11-18 PROCEDURE — 3077F SYST BP >= 140 MM HG: CPT | Performed by: PHYSICIAN ASSISTANT

## 2024-11-18 PROCEDURE — 1123F ACP DISCUSS/DSCN MKR DOCD: CPT | Performed by: PHYSICIAN ASSISTANT

## 2024-11-18 PROCEDURE — 1126F AMNT PAIN NOTED NONE PRSNT: CPT | Performed by: PHYSICIAN ASSISTANT

## 2024-11-18 PROCEDURE — 3017F COLORECTAL CA SCREEN DOC REV: CPT | Performed by: PHYSICIAN ASSISTANT

## 2024-11-18 PROCEDURE — G8484 FLU IMMUNIZE NO ADMIN: HCPCS | Performed by: PHYSICIAN ASSISTANT

## 2024-11-18 RX ORDER — LOSARTAN POTASSIUM 50 MG/1
50 TABLET ORAL DAILY
Qty: 90 TABLET | Refills: 3 | Status: SHIPPED | OUTPATIENT
Start: 2024-11-18

## 2024-11-18 NOTE — PATIENT INSTRUCTIONS
discharge.    -------------------------------------------------------------------------------------------------------------------  Please call our office at [phone] if you have any  of the following symptoms:   Fever of 100.5 or greater  Chills  Shortness of breath  Swelling or pain in one leg    After office hours an answering service is available and will contact a provider for emergencies or if you are experiencing any of the above symptoms.

## 2024-11-18 NOTE — TELEPHONE ENCOUNTER
Requested Prescriptions     Pending Prescriptions Disp Refills    losartan (COZAAR) 50 MG tablet [Pharmacy Med Name: LOSARTAN TABS 50MG] 90 tablet 3     Sig: Take 1 tablet by mouth daily       Verified rx. Last OV 01/12/24. Erx to pharm on file.

## 2024-11-20 ENCOUNTER — HOSPITAL ENCOUNTER (OUTPATIENT)
Dept: MAMMOGRAPHY | Age: 74
Discharge: HOME | End: 2024-11-23
Attending: INTERNAL MEDICINE
Payer: MEDICARE

## 2024-11-20 DIAGNOSIS — E21.0 PRIMARY HYPERPARATHYROIDISM (HCC): ICD-10-CM

## 2024-11-20 DIAGNOSIS — M85.80 OSTEOPENIA, UNSPECIFIED LOCATION: ICD-10-CM

## 2024-11-20 PROCEDURE — 77080 DXA BONE DENSITY AXIAL: CPT

## 2024-12-02 ENCOUNTER — TELEMEDICINE (OUTPATIENT)
Dept: ENDOCRINOLOGY | Age: 74
End: 2024-12-02
Payer: MEDICARE

## 2024-12-02 ENCOUNTER — PATIENT MESSAGE (OUTPATIENT)
Dept: INTERNAL MEDICINE CLINIC | Facility: CLINIC | Age: 74
End: 2024-12-02

## 2024-12-02 DIAGNOSIS — E55.9 VITAMIN D DEFICIENCY: ICD-10-CM

## 2024-12-02 DIAGNOSIS — M85.80 OSTEOPENIA, UNSPECIFIED LOCATION: ICD-10-CM

## 2024-12-02 DIAGNOSIS — E21.0 PRIMARY HYPERPARATHYROIDISM (HCC): Primary | ICD-10-CM

## 2024-12-02 PROCEDURE — G2211 COMPLEX E/M VISIT ADD ON: HCPCS | Performed by: INTERNAL MEDICINE

## 2024-12-02 PROCEDURE — G8420 CALC BMI NORM PARAMETERS: HCPCS | Performed by: INTERNAL MEDICINE

## 2024-12-02 PROCEDURE — G8484 FLU IMMUNIZE NO ADMIN: HCPCS | Performed by: INTERNAL MEDICINE

## 2024-12-02 PROCEDURE — 3017F COLORECTAL CA SCREEN DOC REV: CPT | Performed by: INTERNAL MEDICINE

## 2024-12-02 PROCEDURE — 1159F MED LIST DOCD IN RCRD: CPT | Performed by: INTERNAL MEDICINE

## 2024-12-02 PROCEDURE — 1036F TOBACCO NON-USER: CPT | Performed by: INTERNAL MEDICINE

## 2024-12-02 PROCEDURE — 1123F ACP DISCUSS/DSCN MKR DOCD: CPT | Performed by: INTERNAL MEDICINE

## 2024-12-02 PROCEDURE — G8427 DOCREV CUR MEDS BY ELIG CLIN: HCPCS | Performed by: INTERNAL MEDICINE

## 2024-12-02 PROCEDURE — 99214 OFFICE O/P EST MOD 30 MIN: CPT | Performed by: INTERNAL MEDICINE

## 2024-12-02 RX ORDER — ERGOCALCIFEROL 1.25 MG/1
CAPSULE, LIQUID FILLED ORAL
Qty: 6 CAPSULE | Refills: 3 | Status: SHIPPED | OUTPATIENT
Start: 2024-12-02

## 2024-12-02 ASSESSMENT — ENCOUNTER SYMPTOMS: SHORTNESS OF BREATH: 0

## 2024-12-02 NOTE — PROGRESS NOTES
Deep Hung MD, FACE  [facility]  [address], [address]  [place]  [address]  Phone [phone]  Facsimile [phone]          Chon Dozier is a 74 y.o. male seen 12/2/2024 for follow-up of primary hyperparathyroidism        Chon Dozier, was evaluated through a synchronous (real-time) audio-video encounter. The patient (or guardian if applicable) is aware that this is a billable service, which includes applicable co-pays. This Virtual Visit was conducted with patient's (and/or legal guardian's) consent. Patient identification was verified, and a caregiver was present when appropriate.   The patient was located at Home: [address] [place] [phone]  Provider was located at Facility (Appt Dept): [address] [place] [address] [address]  [place] [address]  Confirm you are appropriately licensed, registered, or certified to deliver care in the state where the patient is located as indicated above. If you are not or unsure, please re-schedule the visit: Yes, I confirm.         ASSESSMENT AND PLAN:    1. Primary hyperparathyroidism (HCC)  He has had an intermittently, mildly elevated serum calcium level in the setting of a nonsuppressed parathyroid hormone level, consistent with primary hyperparathyroidism.  His 24-hour urine calcium was high normal.  Bone density revealed osteopenia.  He does not have any strict indications for parathyroidectomy, and I will therefore continue close observation.    2. Vitamin D deficiency  I will assess his vitamin D level.    - ergocalciferol (ERGOCALCIFEROL) 1,250 mcg (50,000 unit) capsule; Day 1 and 15 of each month  Dispense: 6 Cap; Refill: 3     3. Osteopenia, unspecified location  Bone density 11/2024 revealed slightly worsening osteopenia.  I will plan on repeating a bone density in approximately 11/2026.    Follow-up and Dispositions    Return in about 1 year (around 12/2/2025).         HISTORY OF

## 2024-12-04 ENCOUNTER — LAB (OUTPATIENT)
Dept: INTERNAL MEDICINE CLINIC | Facility: CLINIC | Age: 74
End: 2024-12-04

## 2024-12-04 DIAGNOSIS — E21.0 PRIMARY HYPERPARATHYROIDISM (HCC): ICD-10-CM

## 2024-12-04 DIAGNOSIS — E55.9 VITAMIN D DEFICIENCY: ICD-10-CM

## 2024-12-04 LAB
25(OH)D3 SERPL-MCNC: 43.5 NG/ML (ref 30–100)
ALBUMIN SERPL-MCNC: 4.1 G/DL (ref 3.2–4.6)
ALBUMIN/GLOB SERPL: 1.4 (ref 1–1.9)
ALP SERPL-CCNC: 106 U/L (ref 40–129)
ALT SERPL-CCNC: 21 U/L (ref 8–55)
ANION GAP SERPL CALC-SCNC: 12 MMOL/L (ref 7–16)
AST SERPL-CCNC: 30 U/L (ref 15–37)
BILIRUB SERPL-MCNC: 0.3 MG/DL (ref 0–1.2)
BUN SERPL-MCNC: 11 MG/DL (ref 8–23)
CA-I BLD-MCNC: 5.07 MG/DL (ref 4–5.2)
CALCIUM SERPL-MCNC: 10.1 MG/DL (ref 8.8–10.2)
CALCIUM SERPL-MCNC: 10.3 MG/DL (ref 8.8–10.2)
CHLORIDE SERPL-SCNC: 101 MMOL/L (ref 98–107)
CO2 SERPL-SCNC: 26 MMOL/L (ref 20–29)
CREAT SERPL-MCNC: 0.83 MG/DL (ref 0.8–1.3)
GLOBULIN SER CALC-MCNC: 2.9 G/DL (ref 2.3–3.5)
GLUCOSE SERPL-MCNC: 94 MG/DL (ref 70–99)
PHOSPHATE SERPL-MCNC: 3 MG/DL (ref 2.5–4.5)
POTASSIUM SERPL-SCNC: 4.6 MMOL/L (ref 3.5–5.1)
PROT SERPL-MCNC: 6.9 G/DL (ref 6.3–8.2)
PTH-INTACT SERPL-MCNC: 63.8 PG/ML (ref 15–65)
SODIUM SERPL-SCNC: 139 MMOL/L (ref 136–145)

## 2024-12-16 ENCOUNTER — TELEPHONE (OUTPATIENT)
Age: 74
End: 2024-12-16

## 2024-12-16 NOTE — TELEPHONE ENCOUNTER
Pt wants to his have his loop recorder removed at his appt on December 28th. Explained that this procedure was done at the hospital. States he was told it could be done at his appt.

## 2024-12-16 NOTE — TELEPHONE ENCOUNTER
Incorrect.  This must be done in the cath lab, a sterile environment, so that the pocket and incision don't get infected and abscess... I will be happy to discuss with him at follow-up appointment or if he wishes to get this out before the end of the year, we can arrange labs and cardiac cath procedure.  It usually takes about 15 minutes and is minimally involved.

## 2025-01-22 RX ORDER — PHARMACY COMPOUNDING ACCESSORY
EACH MISCELLANEOUS
Qty: 30 | Refills: 2 | Status: ERX

## 2025-01-24 NOTE — PROGRESS NOTES
[facility] CARDIOLOGY  [address] [place] [address], [address]  [place] [address]  PHONE: [phone]        NAME:  Chon Dozier  : 1950  MRN: [id number]     PCP:  Kathe Turk MD      SUBJECTIVE:   Chon Dozier is a 74 y.o. male seen for a follow up visit regarding the following:     Chief Complaint   Patient presents with    Essential (primary) hypertension       HPI:    He is s/p CVA on 10/23/18 with probable cardioembolic phenomena affecting the left posterior parietal lobe.  He had acute paralysis of his right arm while shaving, with dizziness, mild gait instability at the time, but no other neurologic symptoms. Symptoms gradually resolved but MRI positive for multiple left posterior parietal strokes.  There was no ICH and carotid duplex showed <50% ICA stenosis bilaterally.       Doing well without interval angina, CHF, palpitations, edema, presyncope or syncope still having occasional gait instability and possible hypotension, not really checking his blood pressure at those times.  Vitals controlled and tolerating meds well. BP usually 120-130/80's at home (wife, a nurse, checks BP).  Very active prior to CVA without any significant limitations or exertional symptoms. He denies any symptoms of palpitations or pAF whatsoever since (or prior to) the CVA, but he does have a brother who has had 3 AF ablation in the past....        Diagnosed with a wide-complex tachycardia which was hypothesized to be atrial flutter with aberrancy, recurrent.  Subsequently had atrial flutter ablation in 2021 with Dr. James and doing well since then without interval angina, CHF, palpitations,  edema, presyncope or syncope.  Vitals controlled and tolerating meds well. Staying active without any significant limitations.         PRIYANKA showed no ASD/PFO/endocarditis or MARIELENA clot but with possible very small intrapulmonary shunt (see report). S/p LINQ loop recorder, but no

## 2025-01-28 ENCOUNTER — OFFICE VISIT (OUTPATIENT)
Age: 75
End: 2025-01-28
Payer: MEDICARE

## 2025-01-28 VITALS
HEIGHT: 71 IN | SYSTOLIC BLOOD PRESSURE: 118 MMHG | WEIGHT: 176.6 LBS | HEART RATE: 78 BPM | BODY MASS INDEX: 24.72 KG/M2 | DIASTOLIC BLOOD PRESSURE: 78 MMHG

## 2025-01-28 DIAGNOSIS — I10 PRIMARY HYPERTENSION: ICD-10-CM

## 2025-01-28 DIAGNOSIS — I69.30 HISTORY OF STROKE WITH RESIDUAL DEFICIT: ICD-10-CM

## 2025-01-28 DIAGNOSIS — E78.00 HYPERCHOLESTEREMIA: ICD-10-CM

## 2025-01-28 DIAGNOSIS — Z87.891 HISTORY OF TOBACCO USE: ICD-10-CM

## 2025-01-28 PROCEDURE — 93000 ELECTROCARDIOGRAM COMPLETE: CPT | Performed by: INTERNAL MEDICINE

## 2025-01-28 PROCEDURE — G8427 DOCREV CUR MEDS BY ELIG CLIN: HCPCS | Performed by: INTERNAL MEDICINE

## 2025-01-28 PROCEDURE — 1159F MED LIST DOCD IN RCRD: CPT | Performed by: INTERNAL MEDICINE

## 2025-01-28 PROCEDURE — 3078F DIAST BP <80 MM HG: CPT | Performed by: INTERNAL MEDICINE

## 2025-01-28 PROCEDURE — 3017F COLORECTAL CA SCREEN DOC REV: CPT | Performed by: INTERNAL MEDICINE

## 2025-01-28 PROCEDURE — 1036F TOBACCO NON-USER: CPT | Performed by: INTERNAL MEDICINE

## 2025-01-28 PROCEDURE — 3074F SYST BP LT 130 MM HG: CPT | Performed by: INTERNAL MEDICINE

## 2025-01-28 PROCEDURE — 1123F ACP DISCUSS/DSCN MKR DOCD: CPT | Performed by: INTERNAL MEDICINE

## 2025-01-28 PROCEDURE — 99214 OFFICE O/P EST MOD 30 MIN: CPT | Performed by: INTERNAL MEDICINE

## 2025-01-28 PROCEDURE — 1126F AMNT PAIN NOTED NONE PRSNT: CPT | Performed by: INTERNAL MEDICINE

## 2025-01-28 PROCEDURE — 1160F RVW MEDS BY RX/DR IN RCRD: CPT | Performed by: INTERNAL MEDICINE

## 2025-01-28 PROCEDURE — G8420 CALC BMI NORM PARAMETERS: HCPCS | Performed by: INTERNAL MEDICINE

## 2025-01-28 RX ORDER — LOSARTAN POTASSIUM 50 MG/1
50 TABLET ORAL DAILY
Qty: 90 TABLET | Refills: 3 | Status: SHIPPED | OUTPATIENT
Start: 2025-01-28

## 2025-01-28 RX ORDER — METOPROLOL SUCCINATE 25 MG/1
25 TABLET, EXTENDED RELEASE ORAL DAILY
Qty: 90 TABLET | Refills: 3 | Status: SHIPPED | OUTPATIENT
Start: 2025-01-28

## 2025-02-26 ENCOUNTER — PATIENT MESSAGE (OUTPATIENT)
Dept: INTERNAL MEDICINE CLINIC | Facility: CLINIC | Age: 75
End: 2025-02-26

## 2025-02-26 DIAGNOSIS — Z12.2 SCREENING FOR LUNG CANCER: ICD-10-CM

## 2025-02-26 DIAGNOSIS — Z87.891 PERSONAL HISTORY OF SMOKING: Primary | ICD-10-CM

## 2025-03-07 ENCOUNTER — HOSPITAL ENCOUNTER (OUTPATIENT)
Dept: CT IMAGING | Age: 75
Discharge: HOME | End: 2025-03-09
Attending: INTERNAL MEDICINE
Payer: MEDICARE

## 2025-03-07 DIAGNOSIS — Z12.2 SCREENING FOR LUNG CANCER: ICD-10-CM

## 2025-03-07 DIAGNOSIS — Z87.891 PERSONAL HISTORY OF SMOKING: ICD-10-CM

## 2025-03-07 PROCEDURE — 71271 CT THORAX LUNG CANCER SCR C-: CPT

## 2025-03-10 ENCOUNTER — RESULTS FOLLOW-UP (OUTPATIENT)
Dept: CT IMAGING | Age: 75
End: 2025-03-10

## 2025-05-06 ENCOUNTER — OFFICE VISIT (OUTPATIENT)
Dept: INTERNAL MEDICINE CLINIC | Facility: CLINIC | Age: 75
End: 2025-05-06
Payer: MEDICARE

## 2025-05-06 VITALS
DIASTOLIC BLOOD PRESSURE: 72 MMHG | HEART RATE: 71 BPM | SYSTOLIC BLOOD PRESSURE: 128 MMHG | BODY MASS INDEX: 24.78 KG/M2 | HEIGHT: 71 IN | RESPIRATION RATE: 16 BRPM | WEIGHT: 177 LBS

## 2025-05-06 DIAGNOSIS — R20.0 HAND NUMBNESS: Primary | ICD-10-CM

## 2025-05-06 DIAGNOSIS — E55.9 VITAMIN D DEFICIENCY: ICD-10-CM

## 2025-05-06 DIAGNOSIS — C61 PROSTATE CANCER (HCC): ICD-10-CM

## 2025-05-06 DIAGNOSIS — E21.0 PRIMARY HYPERPARATHYROIDISM: ICD-10-CM

## 2025-05-06 DIAGNOSIS — C61 MALIGNANT NEOPLASM OF PROSTATE (HCC): ICD-10-CM

## 2025-05-06 DIAGNOSIS — I10 PRIMARY HYPERTENSION: Primary | ICD-10-CM

## 2025-05-06 DIAGNOSIS — M15.0 PRIMARY OSTEOARTHRITIS INVOLVING MULTIPLE JOINTS: ICD-10-CM

## 2025-05-06 DIAGNOSIS — E78.00 HYPERCHOLESTEREMIA: ICD-10-CM

## 2025-05-06 DIAGNOSIS — M85.80 OSTEOPENIA, UNSPECIFIED LOCATION: ICD-10-CM

## 2025-05-06 DIAGNOSIS — I10 PRIMARY HYPERTENSION: ICD-10-CM

## 2025-05-06 DIAGNOSIS — Z00.00 MEDICARE ANNUAL WELLNESS VISIT, SUBSEQUENT: ICD-10-CM

## 2025-05-06 DIAGNOSIS — G56.03 BILATERAL CARPAL TUNNEL SYNDROME: ICD-10-CM

## 2025-05-06 DIAGNOSIS — I69.30 HISTORY OF STROKE WITH RESIDUAL DEFICIT: ICD-10-CM

## 2025-05-06 DIAGNOSIS — R26.9 GAIT DISTURBANCE: ICD-10-CM

## 2025-05-06 LAB
ALBUMIN SERPL-MCNC: 4 G/DL (ref 3.2–4.6)
ALBUMIN/GLOB SERPL: 1.4 (ref 1–1.9)
ALP SERPL-CCNC: 71 U/L (ref 40–129)
ALT SERPL-CCNC: 21 U/L (ref 8–55)
ANION GAP SERPL CALC-SCNC: 12 MMOL/L (ref 7–16)
AST SERPL-CCNC: 28 U/L (ref 15–37)
BILIRUB SERPL-MCNC: 0.4 MG/DL (ref 0–1.2)
BUN SERPL-MCNC: 11 MG/DL (ref 8–23)
CALCIUM SERPL-MCNC: 10.2 MG/DL (ref 8.8–10.2)
CHLORIDE SERPL-SCNC: 103 MMOL/L (ref 98–107)
CHOLEST SERPL-MCNC: 164 MG/DL (ref 0–200)
CO2 SERPL-SCNC: 23 MMOL/L (ref 20–29)
CREAT SERPL-MCNC: 0.82 MG/DL (ref 0.8–1.3)
GLOBULIN SER CALC-MCNC: 2.9 G/DL (ref 2.3–3.5)
GLUCOSE SERPL-MCNC: 94 MG/DL (ref 70–99)
HDLC SERPL-MCNC: 77 MG/DL (ref 40–60)
HDLC SERPL: 2.1 (ref 0–5)
LDLC SERPL CALC-MCNC: 71 MG/DL (ref 0–100)
POTASSIUM SERPL-SCNC: 4.5 MMOL/L (ref 3.5–5.1)
PROT SERPL-MCNC: 6.9 G/DL (ref 6.3–8.2)
PSA SERPL-MCNC: 6.1 NG/ML (ref 0–4)
SODIUM SERPL-SCNC: 138 MMOL/L (ref 136–145)
TRIGL SERPL-MCNC: 79 MG/DL (ref 0–150)
TSH W FREE THYROID IF ABNORMAL: 2.04 UIU/ML (ref 0.27–4.2)
VLDLC SERPL CALC-MCNC: 16 MG/DL (ref 6–23)

## 2025-05-06 PROCEDURE — G8427 DOCREV CUR MEDS BY ELIG CLIN: HCPCS | Performed by: INTERNAL MEDICINE

## 2025-05-06 PROCEDURE — 1159F MED LIST DOCD IN RCRD: CPT | Performed by: INTERNAL MEDICINE

## 2025-05-06 PROCEDURE — 1160F RVW MEDS BY RX/DR IN RCRD: CPT | Performed by: INTERNAL MEDICINE

## 2025-05-06 PROCEDURE — G2211 COMPLEX E/M VISIT ADD ON: HCPCS | Performed by: INTERNAL MEDICINE

## 2025-05-06 PROCEDURE — 1036F TOBACCO NON-USER: CPT | Performed by: INTERNAL MEDICINE

## 2025-05-06 PROCEDURE — G0439 PPPS, SUBSEQ VISIT: HCPCS | Performed by: INTERNAL MEDICINE

## 2025-05-06 PROCEDURE — 3078F DIAST BP <80 MM HG: CPT | Performed by: INTERNAL MEDICINE

## 2025-05-06 PROCEDURE — G8420 CALC BMI NORM PARAMETERS: HCPCS | Performed by: INTERNAL MEDICINE

## 2025-05-06 PROCEDURE — 3074F SYST BP LT 130 MM HG: CPT | Performed by: INTERNAL MEDICINE

## 2025-05-06 PROCEDURE — 99214 OFFICE O/P EST MOD 30 MIN: CPT | Performed by: INTERNAL MEDICINE

## 2025-05-06 PROCEDURE — 1123F ACP DISCUSS/DSCN MKR DOCD: CPT | Performed by: INTERNAL MEDICINE

## 2025-05-06 PROCEDURE — 3017F COLORECTAL CA SCREEN DOC REV: CPT | Performed by: INTERNAL MEDICINE

## 2025-05-06 SDOH — ECONOMIC STABILITY: FOOD INSECURITY: WITHIN THE PAST 12 MONTHS, YOU WORRIED THAT YOUR FOOD WOULD RUN OUT BEFORE YOU GOT MONEY TO BUY MORE.: NEVER TRUE

## 2025-05-06 SDOH — ECONOMIC STABILITY: FOOD INSECURITY: WITHIN THE PAST 12 MONTHS, THE FOOD YOU BOUGHT JUST DIDN'T LAST AND YOU DIDN'T HAVE MONEY TO GET MORE.: NEVER TRUE

## 2025-05-06 ASSESSMENT — ENCOUNTER SYMPTOMS
VOMITING: 0
SHORTNESS OF BREATH: 0
BLOOD IN STOOL: 0
WHEEZING: 0
CONSTIPATION: 0
COUGH: 0
DIARRHEA: 0
NAUSEA: 0
BACK PAIN: 1

## 2025-05-06 ASSESSMENT — PATIENT HEALTH QUESTIONNAIRE - PHQ9
SUM OF ALL RESPONSES TO PHQ QUESTIONS 1-9: 0
1. LITTLE INTEREST OR PLEASURE IN DOING THINGS: NOT AT ALL
SUM OF ALL RESPONSES TO PHQ QUESTIONS 1-9: 0
2. FEELING DOWN, DEPRESSED OR HOPELESS: NOT AT ALL

## 2025-05-06 ASSESSMENT — LIFESTYLE VARIABLES
HOW MANY STANDARD DRINKS CONTAINING ALCOHOL DO YOU HAVE ON A TYPICAL DAY: 1 OR 2
HOW OFTEN DURING THE LAST YEAR HAVE YOU HAD A FEELING OF GUILT OR REMORSE AFTER DRINKING: NEVER
HAS A RELATIVE, FRIEND, DOCTOR, OR ANOTHER HEALTH PROFESSIONAL EXPRESSED CONCERN ABOUT YOUR DRINKING OR SUGGESTED YOU CUT DOWN: NO
HOW OFTEN DURING THE LAST YEAR HAVE YOU FOUND THAT YOU WERE NOT ABLE TO STOP DRINKING ONCE YOU HAD STARTED: NEVER
HOW OFTEN DURING THE LAST YEAR HAVE YOU NEEDED AN ALCOHOLIC DRINK FIRST THING IN THE MORNING TO GET YOURSELF GOING AFTER A NIGHT OF HEAVY DRINKING: NEVER
HAVE YOU OR SOMEONE ELSE BEEN INJURED AS A RESULT OF YOUR DRINKING: NO
HOW OFTEN DURING THE LAST YEAR HAVE YOU FAILED TO DO WHAT WAS NORMALLY EXPECTED FROM YOU BECAUSE OF DRINKING: NEVER
HOW OFTEN DO YOU HAVE A DRINK CONTAINING ALCOHOL: 2-3 TIMES A WEEK
HOW OFTEN DURING THE LAST YEAR HAVE YOU BEEN UNABLE TO REMEMBER WHAT HAPPENED THE NIGHT BEFORE BECAUSE YOU HAD BEEN DRINKING: NEVER

## 2025-05-06 NOTE — PROGRESS NOTES
5/6/2025 2:15 PM  Location:[facility] PHYSICIAN SERVICES  [facility] INTERNAL MEDICINE  [place]  Patient #:  [id number]  YOB: 1950              Chief Complaint   Patient presents with    Medicare AWV Medicare wellness       6 Month Follow-Up     6 month f/u    Numbness     Complains with right hand numbness.     Gait Problem     Complains with episodes of unsteady gait.        Mr. Dozier is a 75 y.o. male  who presents for the above.     History of Present Illness  The patient is a 75-year-old male who presents for an annual wellness exam.    He reports experiencing transient unsteadiness upon standing, which he attributes to his metoprolol succinate medication. He has been managing this by moving his toes and nose slowly, which appears to alleviate the symptoms. He is currently in the process of resuming his exercise routine, specifically walking.    He has a history of bilateral carpal tunnel release surgery performed by Dr. Thayer. He believes that his right-sided symptoms are a residual effect of a previous stroke. He reports a recurrence of symptoms, including difficulty in finger coordination and frequent knocking over of objects. He also experiences weakness in his hand. He finds some relief from wearing a tight glove.    He experiences hip discomfort when engaging in strenuous activities such as mowing the lawn, but this does not interfere with his sleep. The discomfort is described as fatigue and achiness.    PAST SURGICAL HISTORY:  - Bilateral carpal tunnel release  -     SOCIAL HISTORY  He quit smoking almost 4 years ago.       No Known Allergies  Past Medical History:   Diagnosis Date    Actinic keratosis     Adverse effect of anesthesia     liver enzymes increased for 1 week following general anesthesia for shoulder sx    Anxiety     Atrial flutter (HCC)     s/p ablation 1/7/21    CVA (cerebral vascular accident) (HCC) 10/23/2018    numbness remains in R hand    Erectile

## 2025-05-06 NOTE — PATIENT INSTRUCTIONS
protect your heart. It is never too late to quit. Try to avoid secondhand smoke too.     Stay at a weight that's healthy for you. Talk to your doctor if you need help losing weight.     Try to get 7 to 9 hours of sleep each night.     Limit alcohol to 2 drinks a day for men and 1 drink a day for women. Too much alcohol can cause health problems.     Manage other health problems such as diabetes, high blood pressure, and high cholesterol. If you think you may have a problem with alcohol or drug use, talk to your doctor.   Medicines    Take your medicines exactly as prescribed. Call your doctor if you think you are having a problem with your medicine.     If your doctor recommends aspirin, take the amount directed each day. Make sure you take aspirin and not another kind of pain reliever, such as acetaminophen (Tylenol).   When should you call for help?   Call 911 if you have symptoms of a heart attack. These may include:    Chest pain or pressure, or a strange feeling in the chest.     Sweating.     Shortness of breath.     Pain, pressure, or a strange feeling in the back, neck, jaw, or upper belly or in one or both shoulders or arms.     Lightheadedness or sudden weakness.     A fast or irregular heartbeat.   After you call 911, the  may tell you to chew 1 adult-strength or 2 to 4 low-dose aspirin. Wait for an ambulance. Do not try to drive yourself.  Watch closely for changes in your health, and be sure to contact your doctor if you have any problems.  Where can you learn more?  Go to https://www.[employer]ot[employer]ish.net/patientEd and enter F075 to learn more about \"A Healthy Heart: Care Instructions.\"  Current as of: July 31, 2024  Content Version: 14.4  © 0547-6144 [employer].   Care instructions adapted under license by [employer]. If you have questions about a medical condition or this instruction, always ask your healthcare professional. [employer] disclaims any warranty or liability

## 2025-05-07 ENCOUNTER — RESULTS FOLLOW-UP (OUTPATIENT)
Dept: INTERNAL MEDICINE CLINIC | Facility: CLINIC | Age: 75
End: 2025-05-07

## 2025-05-07 NOTE — RESULT ENCOUNTER NOTE
Labs are stable.  PSA is still 6.1.  Forwarded to Emily.  Continue your current doses of medications. Keep up the good work.  Thanks.  [facility]

## 2025-05-19 ENCOUNTER — TELEPHONE (OUTPATIENT)
Dept: INTERNAL MEDICINE CLINIC | Facility: CLINIC | Age: 75
End: 2025-05-19

## 2025-05-23 ENCOUNTER — CLINICAL DOCUMENTATION (OUTPATIENT)
Age: 75
End: 2025-05-23

## 2025-05-23 NOTE — PROGRESS NOTES
Left patient a detailed VM not that his upcoming appointment with Dr Diaz on 5/30 has been cancelled due to the MRI not being completed. I advised the patient to call radiology scheduling at [phone] to get this scheduled. I informed him of his new appointment on 6/13 and if the day and time did not work for him please call our office back.

## 2025-05-29 NOTE — TELEPHONE ENCOUNTER
I have talked with Mr. Dozier and he has decided to just say no to the OT. He has stated that he would go back to Dr. Thayer.    
I left a message for the patient to return my call.    
Left message for pt to return call.     
Please call and ask if he is willing to go to their office in [place]. Thanks.  RENEE   
Pt declined going to [place] ([place])   
[facility] has called to let Dr. Turk know that they do not do OT over there. However, they do in [place] if the patient wants to travel that far.   
We can try OT in [place] at [facility] if you are willing.  
Discharged

## 2025-06-03 ENCOUNTER — HOSPITAL ENCOUNTER (OUTPATIENT)
Dept: MRI IMAGING | Age: 75
Discharge: HOME | End: 2025-06-06
Payer: MEDICARE

## 2025-06-03 DIAGNOSIS — C61 PROSTATE CANCER (HCC): ICD-10-CM

## 2025-06-03 PROCEDURE — A9579 GAD-BASE MR CONTRAST NOS,1ML: HCPCS | Performed by: PHYSICIAN ASSISTANT

## 2025-06-03 PROCEDURE — 72197 MRI PELVIS W/O & W/DYE: CPT

## 2025-06-03 PROCEDURE — 6360000004 HC RX CONTRAST MEDICATION: Performed by: PHYSICIAN ASSISTANT

## 2025-06-03 RX ADMIN — GADOTERIDOL 15 ML: 279.3 INJECTION, SOLUTION INTRAVENOUS at 14:28

## 2025-06-13 ENCOUNTER — OFFICE VISIT (OUTPATIENT)
Age: 75
End: 2025-06-13
Payer: MEDICARE

## 2025-06-13 VITALS
HEART RATE: 71 BPM | HEIGHT: 71 IN | WEIGHT: 173.4 LBS | SYSTOLIC BLOOD PRESSURE: 138 MMHG | DIASTOLIC BLOOD PRESSURE: 79 MMHG | TEMPERATURE: 97.8 F | RESPIRATION RATE: 18 BRPM | BODY MASS INDEX: 24.27 KG/M2

## 2025-06-13 DIAGNOSIS — C61 PROSTATE CANCER (HCC): Primary | ICD-10-CM

## 2025-06-13 PROCEDURE — 1036F TOBACCO NON-USER: CPT | Performed by: UROLOGY

## 2025-06-13 PROCEDURE — 1159F MED LIST DOCD IN RCRD: CPT | Performed by: UROLOGY

## 2025-06-13 PROCEDURE — 3017F COLORECTAL CA SCREEN DOC REV: CPT | Performed by: UROLOGY

## 2025-06-13 PROCEDURE — 3078F DIAST BP <80 MM HG: CPT | Performed by: UROLOGY

## 2025-06-13 PROCEDURE — 1160F RVW MEDS BY RX/DR IN RCRD: CPT | Performed by: UROLOGY

## 2025-06-13 PROCEDURE — G8427 DOCREV CUR MEDS BY ELIG CLIN: HCPCS | Performed by: UROLOGY

## 2025-06-13 PROCEDURE — 3075F SYST BP GE 130 - 139MM HG: CPT | Performed by: UROLOGY

## 2025-06-13 PROCEDURE — G8420 CALC BMI NORM PARAMETERS: HCPCS | Performed by: UROLOGY

## 2025-06-13 PROCEDURE — 1126F AMNT PAIN NOTED NONE PRSNT: CPT | Performed by: UROLOGY

## 2025-06-13 PROCEDURE — 1123F ACP DISCUSS/DSCN MKR DOCD: CPT | Performed by: UROLOGY

## 2025-06-13 PROCEDURE — 99213 OFFICE O/P EST LOW 20 MIN: CPT | Performed by: UROLOGY

## 2025-06-13 ASSESSMENT — ENCOUNTER SYMPTOMS
GASTROINTESTINAL NEGATIVE: 1
RESPIRATORY NEGATIVE: 1

## 2025-06-13 NOTE — PROGRESS NOTES
NA  
Prostate protocol;  including T2, T1, diffusion, and dynamic enhanced imaging. 15 CC of ProHance.    LIMITATIONS: Bilateral hip replacements.    FINDINGS:    - PROSTATE: 4.8 x 2.9 x 3.7 cm. Volume is 27 mL.    - PERIPHERAL ZONE: Mild chronic change noted bilaterally. No discrete mass or  focal restricted diffusion.    - TRANSITION ZONE: Mild nodular hypertrophic changes.  Lesion #1: 12 mm low signal mass in the anterior base transitional zone, near  the midline. Measured 11 mm on the prior study. Demonstrates focal restricted  diffusion and hypervascularity. Capsule grossly intact. PI-RADS 4    - SEMINAL VESICLES: Unremarkable.    - PELVIC METASTATIC DISEASE: No suspicious lymphadenopathy or bony lesions.    - SIGNIFICANT ADDITIONAL FINDINGS: Bilateral hip replacements with associated  susceptibility artifact.    - Impression -  Midline anterior transitional zone lesion near the base of the  prostate. 12 mm in diameter compared with 11 mm on the prior exam. No evidence  of extracapsular extension or metastatic disease. PI-RADS 4      Electronically signed by KELLE ERWIN    ASSESSMENT:    Mr. Chon Dozier is a 75 y.o. male with a diagnosis of  GS 3+3=6 PCa diagnosed on transperineal prostate biopsy 10/3/23. cT1c, 4 of 13 cores. On AS     We again discussed typical active surveillance follow-up protocols.  I recommended a repeat biopsy even though his MRI appears relatively stable.  He is hesitant to move forward with this.  We again discussed the potential ventral risks of the biopsy.  He would like to think it over with his wife this weekend and will call and let us know if he wishes to move forward with a biopsy.  If he does not I will plan to see him back in 6 months with a PSA at that appointment.  I was very clear that I do recommend a repeat biopsy to help us be more reassured there is no greater or volume progression.    PLAN:     - MRI reviewed, looks relatively unchanged  - recommended

## 2025-08-04 ENCOUNTER — OFFICE VISIT (OUTPATIENT)
Age: 75
End: 2025-08-04
Payer: MEDICARE

## 2025-08-04 VITALS
DIASTOLIC BLOOD PRESSURE: 80 MMHG | BODY MASS INDEX: 24.77 KG/M2 | SYSTOLIC BLOOD PRESSURE: 124 MMHG | WEIGHT: 173 LBS | HEIGHT: 70 IN | HEART RATE: 59 BPM

## 2025-08-04 DIAGNOSIS — Z79.01 CHRONIC ANTICOAGULATION: ICD-10-CM

## 2025-08-04 DIAGNOSIS — Z87.891 HISTORY OF TOBACCO USE: ICD-10-CM

## 2025-08-04 DIAGNOSIS — I10 PRIMARY HYPERTENSION: Primary | ICD-10-CM

## 2025-08-04 DIAGNOSIS — I69.30 HISTORY OF STROKE WITH RESIDUAL DEFICIT: ICD-10-CM

## 2025-08-04 DIAGNOSIS — E78.00 HYPERCHOLESTEREMIA: ICD-10-CM

## 2025-08-04 PROCEDURE — 99214 OFFICE O/P EST MOD 30 MIN: CPT | Performed by: INTERNAL MEDICINE

## 2025-08-04 PROCEDURE — 93000 ELECTROCARDIOGRAM COMPLETE: CPT | Performed by: INTERNAL MEDICINE

## 2025-08-04 PROCEDURE — 1159F MED LIST DOCD IN RCRD: CPT | Performed by: INTERNAL MEDICINE

## 2025-08-04 PROCEDURE — 1036F TOBACCO NON-USER: CPT | Performed by: INTERNAL MEDICINE

## 2025-08-04 PROCEDURE — 1123F ACP DISCUSS/DSCN MKR DOCD: CPT | Performed by: INTERNAL MEDICINE

## 2025-08-04 PROCEDURE — G8427 DOCREV CUR MEDS BY ELIG CLIN: HCPCS | Performed by: INTERNAL MEDICINE

## 2025-08-04 PROCEDURE — 1126F AMNT PAIN NOTED NONE PRSNT: CPT | Performed by: INTERNAL MEDICINE

## 2025-08-04 PROCEDURE — 3017F COLORECTAL CA SCREEN DOC REV: CPT | Performed by: INTERNAL MEDICINE

## 2025-08-04 PROCEDURE — G8420 CALC BMI NORM PARAMETERS: HCPCS | Performed by: INTERNAL MEDICINE

## 2025-08-04 PROCEDURE — 3079F DIAST BP 80-89 MM HG: CPT | Performed by: INTERNAL MEDICINE

## 2025-08-04 PROCEDURE — 3074F SYST BP LT 130 MM HG: CPT | Performed by: INTERNAL MEDICINE

## 2025-08-04 RX ORDER — METOPROLOL SUCCINATE 25 MG/1
25 TABLET, EXTENDED RELEASE ORAL DAILY
Qty: 90 TABLET | Refills: 3 | Status: SHIPPED | OUTPATIENT
Start: 2025-08-04

## 2025-08-04 RX ORDER — LOSARTAN POTASSIUM 50 MG/1
50 TABLET ORAL DAILY
Qty: 90 TABLET | Refills: 3 | Status: SHIPPED | OUTPATIENT
Start: 2025-08-04

## 2025-08-25 RX ORDER — TADALAFIL 20 MG/1
TABLET ORAL
Qty: 18 TABLET | Refills: 5 | Status: SHIPPED | OUTPATIENT
Start: 2025-08-25

## (undated) DEVICE — DRAPE,U/SHT,SPLIT,FILM,60X84,STERILE: Brand: MEDLINE

## (undated) DEVICE — REM POLYHESIVE ADULT PATIENT RETURN ELECTRODE: Brand: VALLEYLAB

## (undated) DEVICE — SOLUTION IV 1000ML 0.9% SOD CHL

## (undated) DEVICE — SUTURE VCRL SZ 3-0 L18IN ABSRB UD PS-2 L19MM 1/2 CIR J497G

## (undated) DEVICE — STRYKER PERFORMANCE SERIES SAGITTAL BLADE: Brand: STRYKER PERFORMANCE SERIES

## (undated) DEVICE — ZIMMER® STERILE DISPOSABLE TOURNIQUET CUFF WITH PLC, DUAL PORT, SINGLE BLADDER, 18 IN. (46 CM)

## (undated) DEVICE — SYR 10ML LUER LOK 1/5ML GRAD --

## (undated) DEVICE — TRAY PREP DRY W/ PREM GLV 2 APPL 6 SPNG 2 UNDPD 1 OVERWRAP

## (undated) DEVICE — GEL MEDC ULTRASOUND 5L -- REPLACED BY 326862

## (undated) DEVICE — SUTURE VCRL SZ 1 L27IN ABSRB UD L36MM CP-1 1/2 CIR REV CUT J268H

## (undated) DEVICE — STANDARD HYPODERMIC NEEDLE,POLYPROPYLENE HUB: Brand: MONOJECT

## (undated) DEVICE — NEEDLE HYPO 18GA L1.5IN PNK S STL HUB POLYPR SHLD REG BVL

## (undated) DEVICE — SUTURE MCRYL SZ 3-0 L27IN ABSRB UD L24MM PS-1 3/8 CIR PRIM Y936H

## (undated) DEVICE — GOWN,AURORA,FABRIC-REINFORCED,2XL: Brand: MEDLINE

## (undated) DEVICE — 48" PROBE COVER W/GEL, ULTRASOUND, STERILE: Brand: SITE-RITE

## (undated) DEVICE — SOLUTION IV 250ML 0.9% SOD CHL CLR INJ FLX BG CONT PRT CLSR

## (undated) DEVICE — BNDG,ELSTC,MATRIX,STRL,2"X5YD,LF,HOOK&LP: Brand: MEDLINE

## (undated) DEVICE — STOCKINETTE,IMPERVIOUS,12X48,STERILE: Brand: MEDLINE

## (undated) DEVICE — HAND PACK: Brand: MEDLINE INDUSTRIES, INC.

## (undated) DEVICE — SYRINGE EAR 2OZ ULC SLIMMER TIP FLAT BTM SUCT PWR DISP FOR

## (undated) DEVICE — SUTURE MCRYL SZ 3-0 L27IN ABSRB UD L19MM PS-2 3/8 CIR PRIM Y427H

## (undated) DEVICE — TOTAL HIP DR KAVOLUS: Brand: MEDLINE INDUSTRIES, INC.

## (undated) DEVICE — HAND PK

## (undated) DEVICE — SYR LR LCK 1ML GRAD NSAF 30ML --

## (undated) DEVICE — DRAPE TWL SURG 16X26IN BLU ORB04] ALLCARE INC]

## (undated) DEVICE — STERILE HOOK LOCK LATEX FREE ELASTIC BANDAGE 3INX5YD: Brand: HOOK LOCK™

## (undated) DEVICE — DRAPE,HAND,STERILE: Brand: MEDLINE

## (undated) DEVICE — KIT BX PROST 20ML VI PREFIL W 10ML 10% NEUT BUFF FRMLN LEAK

## (undated) DEVICE — NEEDLE HYPO 21GA L1.5IN INTRAMUSCULAR S STL LATCH BVL UP

## (undated) DEVICE — (D)PREP SKN CHLRAPRP APPL 26ML -- CONVERT TO ITEM 371833

## (undated) DEVICE — SOLUTION IRRIG 3000ML 0.9% SOD CHL FLX CONT 0797208] ICU MEDICAL INC]

## (undated) DEVICE — SKIN MARKER,REGULAR TIP WITH RULER AND LABELS: Brand: DEVON

## (undated) DEVICE — PRECISION THIN (9.0 X 0.38 X 18.5MM)

## (undated) DEVICE — DRAPE, EXTREMITY, BILATERAL, STERILE: Brand: MEDLINE

## (undated) DEVICE — SX-ONE MICROKNIFE IS REBRANDED AS ULTRAGUIDECTR.  ULTRAGUIDECTR IS INTENDED TO TRANSECT THE TRANSVERSE CARPAL LIGAMENT FOR THE TREATMENT OF CARPAL TUNNEL SYNDROME.ULTRAGUIDECTR IS A DISPOSABLE DEVICE INTENDED TO CREATE SPACE WITHIN THE CARPAL TUNNEL AND TRANSECT THE TRANSVERSE CARPAL LIGAMENT (TCL) FOR THE TREATMENT OF CARPAL TUNNEL SYNDROME.: Brand: SX-ONE MICROKNIFE

## (undated) DEVICE — YANKAUER,BULB TIP,W/O VENT,RIGID,STERILE: Brand: MEDLINE

## (undated) DEVICE — DRAPE,TOP,102X53,STERILE: Brand: MEDLINE

## (undated) DEVICE — T4 HOOD

## (undated) DEVICE — JELLY LUBRICATING 10GM PREFIL SYR LUBE

## (undated) DEVICE — BIPOLAR FORCEPS CORD: Brand: VALLEYLAB

## (undated) DEVICE — BUTTON SWITCH PENCIL BLADE ELECTRODE, HOLSTER: Brand: EDGE

## (undated) DEVICE — SYSTEM SKIN CLSR 22CM DERMBND PRINEO

## (undated) DEVICE — Device

## (undated) DEVICE — PENROSE DRAIN 12" X 1/4: Brand: CARDINAL HEALTH

## (undated) DEVICE — BANDAGE COMPR W1INXL5YD FOAM COHESIVE QUIK STK SELF ADH SFT

## (undated) DEVICE — STOCKINETTE 2 PLY 4INX48YD -- MEDICHOICE

## (undated) DEVICE — GLOVE ORANGE PI 7 1/2   MSG9075

## (undated) DEVICE — STOCKINETTE: Brand: DEROYAL

## (undated) DEVICE — DRAPE XR C ARM 41X74IN LF --

## (undated) DEVICE — SUTURE ETHBND EXCEL SZ 5 L30IN NONABSORBABLE GRN L40MM V-37 MB66G

## (undated) DEVICE — AMD ANTIMICROBIAL GAUZE SPONGES,12 PLY USP TYPE VII, 0.2% POLYHEXAMETHYLENE BIGUANIDE HCI (PHMB): Brand: CURITY

## (undated) DEVICE — SUTURE VCRL SZ 0 L36IN ABSRB UD L36MM CT-1 1/2 CIR J946H

## (undated) DEVICE — SUTURE VCRL SZ 1 L36IN ABSRB UD CTX L48MM 1/2 CIR J977H

## (undated) DEVICE — SUTURE MCRYL SZ 2-0 L27IN ABSRB UD CP-1 1 L36MM 1/2 CIR REV Y266H

## (undated) DEVICE — SOLUTION IRRIG 1000ML 0.9% SOD CHL USP POUR PLAS BTL

## (undated) DEVICE — SUTURE COAT VCRL SZ 4-0 L18IN ABSRB UD L19MM PS-2 1/2 CIR J496G

## (undated) DEVICE — DRAPE SHT 3 QTR PROXIMA 53X77 --

## (undated) DEVICE — APPLICATOR MEDICATED 26 CC SOLUTION HI LT ORNG CHLORAPREP

## (undated) DEVICE — SUTURE VCRL SZ 2-0 L27IN ABSRB UD L26MM CT-2 1/2 CIR J269H

## (undated) DEVICE — 1010 S-DRAPE TOWEL DRAPE 10/BX: Brand: STERI-DRAPE™

## (undated) DEVICE — SHEET, ORTHO, SPLIT, STERILE: Brand: MEDLINE

## (undated) DEVICE — SKIN MARKER,REGULAR TIP WITH RULER: Brand: DEVON

## (undated) DEVICE — KENDALL SCD EXPRESS SLEEVES, KNEE LENGTH, MEDIUM: Brand: KENDALL SCD

## (undated) DEVICE — 3M™ COBAN™ SELF-ADHERENT WRAP, 1586S, STERILE, 6 IN X 5 YD (15 CM X 4,5 M), 12 ROLLS/CASE: Brand: 3M™ COBAN™

## (undated) DEVICE — AMD ANTIMICROBIAL BANDAGE ROLL,6 PLY: Brand: KERLIX

## (undated) DEVICE — PREP CHLORAPREP 10.5 ML ORG --

## (undated) DEVICE — BANDAGE COMPR 9 FTX4 IN SMOOTH COMFORTABLE SYNTH ESMRK LF

## (undated) DEVICE — INTENDED FOR TISSUE SEPARATION, AND OTHER PROCEDURES THAT REQUIRE A SHARP SURGICAL BLADE TO PUNCTURE OR CUT.: Brand: BARD-PARKER ®  SAFETY SCALPED

## (undated) DEVICE — CURITY NON-ADHERENT STRIPS: Brand: CURITY

## (undated) DEVICE — STOCKINETTE COMPRESSION W9XL48IN IMPERVIOUS WITH PULL TAB

## (undated) DEVICE — APPLICATOR BNDG 1MM ADH PREMIERPRO EXOFIN

## (undated) DEVICE — SYRINGE MED 10ML LUERLOCK TIP W/O SFTY DISP

## (undated) DEVICE — SUTURE COAT VCRL SZ 5-0 L18IN ABSRB UD L19MM PS-2 1/2 CIR J495G

## (undated) DEVICE — 3M™ TEGADERM™ TRANSPARENT FILM DRESSING FRAME STYLE, 1626W, 4 IN X 4-3/4 IN (10 CM X 12 CM), 50/CT 4CT/CASE: Brand: 3M™ TEGADERM™

## (undated) DEVICE — 3M™ TEGADERM™ TRANSPARENT FILM DRESSING FRAME STYLE, 1627, 4 IN X 10 IN (10 CM X 25 CM), 20/CT 4CT/CASE: Brand: 3M™ TEGADERM™

## (undated) DEVICE — PAD,NON-ADHERENT,3X8,STERILE,LF,1/PK: Brand: MEDLINE

## (undated) DEVICE — SUTURE NONABSORBABLE MONOFILAMENT 4-0 PS-2 18 IN BLU PROLENE 8682H

## (undated) DEVICE — CURITY GAUZE SPONGES: Brand: CURITY

## (undated) DEVICE — SYR 50ML LR LCK 1ML GRAD NSAF --

## (undated) DEVICE — BIPOLAR SEALER 23-112-1 AQM 6.0: Brand: AQUAMANTYS ®

## (undated) DEVICE — STERILE POLYISOPRENE POWDER-FREE SURGICAL GLOVES: Brand: PROTEXIS

## (undated) DEVICE — HANDPIECE SET WITH COAXIAL HIGH FLOW TIP AND SUCTION TUBE: Brand: INTERPULSE

## (undated) DEVICE — BANDAGE COMPR SELF ADH 5 YDX4 IN TAN STRL PREMIERPRO LF

## (undated) DEVICE — DRESSING,GAUZE,XEROFORM,CURAD,1"X8",ST: Brand: CURAD

## (undated) DEVICE — SUTURE VCRL SZ 3-0 L27IN ABSRB UD L26MM SH 1/2 CIR J416H

## (undated) DEVICE — CATH KT THOR PERC 14FR 23CM --

## (undated) DEVICE — PADDING CAST COHESIVE 4 YDX3 IN HND TEARABLE COTTON SPEC 100

## (undated) DEVICE — STERILE HOOK LOCK LATEX FREE ELASTIC BANDAGE 6INX5YD: Brand: HOOK LOCK™

## (undated) DEVICE — SYR 3ML LL TIP 1/10ML GRAD --

## (undated) DEVICE — COVER PRB W1XL11.8IN ENDOCAVITY CLR E BND NEOGUARD

## (undated) DEVICE — DRAPE, FILM SHEET, 44X65 STERILE: Brand: MEDLINE

## (undated) DEVICE — STRIP,CLOSURE,WOUND,MEDI-STRIP,1/2X4: Brand: MEDLINE

## (undated) DEVICE — ROYAL SILK SURGICAL GOWN, XXL: Brand: CONVERTORS

## (undated) DEVICE — BANDAGE COBAN 4 IN COMPR W4INXL5YD FOAM COHESIVE QUIK STK SELF ADH SFT

## (undated) DEVICE — SLING ARM 2-37INX8-17IN PCH -- MED

## (undated) DEVICE — MAX-CORE® DISPOSABLE CORE BIOPSY INSTRUMENT, 18G X 25CM: Brand: MAX-CORE

## (undated) DEVICE — GEL US 20GM NONIRRITATING OVERWRAPPED FILE PCH TRNSMIT

## (undated) DEVICE — 4.0MM EGG BUR